# Patient Record
Sex: FEMALE | Race: WHITE | NOT HISPANIC OR LATINO | Employment: FULL TIME | URBAN - METROPOLITAN AREA
[De-identification: names, ages, dates, MRNs, and addresses within clinical notes are randomized per-mention and may not be internally consistent; named-entity substitution may affect disease eponyms.]

---

## 2023-06-05 LAB
ANION GAP IN SER/PLAS: 7 MMOL/L (ref 10–20)
CALCIUM (MG/DL) IN SER/PLAS: 8.7 MG/DL (ref 8.6–10.3)
CARBON DIOXIDE, TOTAL (MMOL/L) IN SER/PLAS: 28 MMOL/L (ref 21–32)
CHLORIDE (MMOL/L) IN SER/PLAS: 105 MMOL/L (ref 98–107)
CREATININE (MG/DL) IN SER/PLAS: 1.01 MG/DL (ref 0.5–1.05)
ERYTHROCYTE DISTRIBUTION WIDTH (RATIO) BY AUTOMATED COUNT: 13.2 % (ref 11.5–14.5)
ERYTHROCYTE MEAN CORPUSCULAR HEMOGLOBIN CONCENTRATION (G/DL) BY AUTOMATED: 33.1 G/DL (ref 32–36)
ERYTHROCYTE MEAN CORPUSCULAR VOLUME (FL) BY AUTOMATED COUNT: 91 FL (ref 80–100)
ERYTHROCYTES (10*6/UL) IN BLOOD BY AUTOMATED COUNT: 4.54 X10E12/L (ref 4–5.2)
ESTIMATED AVERAGE GLUCOSE FOR HBA1C: 105 MG/DL
GFR FEMALE: 65 ML/MIN/1.73M2
GLUCOSE (MG/DL) IN SER/PLAS: 84 MG/DL (ref 74–99)
HEMATOCRIT (%) IN BLOOD BY AUTOMATED COUNT: 41.1 % (ref 36–46)
HEMOGLOBIN (G/DL) IN BLOOD: 13.6 G/DL (ref 12–16)
HEMOGLOBIN A1C/HEMOGLOBIN TOTAL IN BLOOD: 5.3 %
INR IN PPP BY COAGULATION ASSAY: 1 (ref 0.9–1.1)
LEUKOCYTES (10*3/UL) IN BLOOD BY AUTOMATED COUNT: 6.8 X10E9/L (ref 4.4–11.3)
NRBC (PER 100 WBCS) BY AUTOMATED COUNT: 0 /100 WBC (ref 0–0)
PLATELETS (10*3/UL) IN BLOOD AUTOMATED COUNT: 166 X10E9/L (ref 150–450)
POTASSIUM (MMOL/L) IN SER/PLAS: 3.9 MMOL/L (ref 3.5–5.3)
PROTHROMBIN TIME (PT) IN PPP BY COAGULATION ASSAY: 11.4 SEC (ref 9.8–13.4)
RBC, URINE: NORMAL /HPF (ref 0–5)
SODIUM (MMOL/L) IN SER/PLAS: 136 MMOL/L (ref 136–145)
SQUAMOUS EPITHELIAL CELLS, URINE: 1 /HPF
UREA NITROGEN (MG/DL) IN SER/PLAS: 22 MG/DL (ref 6–23)
WBC, URINE: NORMAL /HPF (ref 0–5)

## 2023-06-07 LAB — URINE CULTURE: ABNORMAL

## 2023-06-09 LAB
3-OH-COTININE, URINE: <50 NG/ML
ANABASINE, URINE: <5 NG/ML
COTININE, URINE: <15 NG/ML
NICOTINE, URINE: <15 NG/ML

## 2023-06-27 ENCOUNTER — HOSPITAL ENCOUNTER (OUTPATIENT)
Dept: DATA CONVERSION | Facility: HOSPITAL | Age: 58
End: 2023-06-27
Attending: STUDENT IN AN ORGANIZED HEALTH CARE EDUCATION/TRAINING PROGRAM | Admitting: STUDENT IN AN ORGANIZED HEALTH CARE EDUCATION/TRAINING PROGRAM
Payer: COMMERCIAL

## 2023-06-27 DIAGNOSIS — F64.9 GENDER IDENTITY DISORDER, UNSPECIFIED: ICD-10-CM

## 2023-06-27 DIAGNOSIS — T81.31XA DISRUPTION OF EXTERNAL OPERATION (SURGICAL) WOUND, NOT ELSEWHERE CLASSIFIED, INITIAL ENCOUNTER: ICD-10-CM

## 2023-06-27 DIAGNOSIS — Z53.9 PROCEDURE AND TREATMENT NOT CARRIED OUT, UNSPECIFIED REASON: ICD-10-CM

## 2023-06-27 LAB
ABO GROUP (TYPE) IN BLOOD: NORMAL
ABO GROUP (TYPE) IN BLOOD: NORMAL
ANTIBODY SCREEN: NORMAL
RH FACTOR: NORMAL
RH FACTOR: NORMAL

## 2023-07-01 LAB
ATRIAL RATE: 76 BPM
P AXIS: 52 DEGREES
P OFFSET: 187 MS
P ONSET: 133 MS
PR INTERVAL: 160 MS
Q ONSET: 213 MS
QRS COUNT: 13 BEATS
QRS DURATION: 90 MS
QT INTERVAL: 394 MS
QTC CALCULATION(BAZETT): 443 MS
QTC FREDERICIA: 426 MS
R AXIS: 16 DEGREES
T AXIS: 44 DEGREES
T OFFSET: 410 MS
VENTRICULAR RATE: 76 BPM

## 2023-09-11 LAB
ANION GAP IN SER/PLAS: 9 MMOL/L (ref 10–20)
APPEARANCE, URINE: NORMAL
BILIRUBIN, URINE: NEGATIVE
BLOOD, URINE: NEGATIVE
CALCIUM (MG/DL) IN SER/PLAS: 8.9 MG/DL (ref 8.6–10.3)
CARBON DIOXIDE, TOTAL (MMOL/L) IN SER/PLAS: 25 MMOL/L (ref 21–32)
CHLORIDE (MMOL/L) IN SER/PLAS: 106 MMOL/L (ref 98–107)
COLOR, URINE: YELLOW
CREATININE (MG/DL) IN SER/PLAS: 1.22 MG/DL (ref 0.5–1.05)
ERYTHROCYTE DISTRIBUTION WIDTH (RATIO) BY AUTOMATED COUNT: 13.6 % (ref 11.5–14.5)
ERYTHROCYTE MEAN CORPUSCULAR HEMOGLOBIN CONCENTRATION (G/DL) BY AUTOMATED: 32.7 G/DL (ref 32–36)
ERYTHROCYTE MEAN CORPUSCULAR VOLUME (FL) BY AUTOMATED COUNT: 91 FL (ref 80–100)
ERYTHROCYTES (10*6/UL) IN BLOOD BY AUTOMATED COUNT: 4.75 X10E12/L (ref 4–5.2)
GFR FEMALE: 52 ML/MIN/1.73M2
GLUCOSE (MG/DL) IN SER/PLAS: 97 MG/DL (ref 74–99)
GLUCOSE, URINE: NEGATIVE MG/DL
HEMATOCRIT (%) IN BLOOD BY AUTOMATED COUNT: 43.4 % (ref 36–46)
HEMOGLOBIN (G/DL) IN BLOOD: 14.2 G/DL (ref 12–16)
KETONES, URINE: NEGATIVE MG/DL
LEUKOCYTE ESTERASE, URINE: NEGATIVE
LEUKOCYTES (10*3/UL) IN BLOOD BY AUTOMATED COUNT: 5.9 X10E9/L (ref 4.4–11.3)
NITRITE, URINE: NEGATIVE
PH, URINE: 6 (ref 5–8)
PLATELETS (10*3/UL) IN BLOOD AUTOMATED COUNT: 181 X10E9/L (ref 150–450)
POTASSIUM (MMOL/L) IN SER/PLAS: 3.7 MMOL/L (ref 3.5–5.3)
PROTEIN, URINE: NEGATIVE MG/DL
SODIUM (MMOL/L) IN SER/PLAS: 136 MMOL/L (ref 136–145)
SPECIFIC GRAVITY, URINE: 1.02 (ref 1–1.03)
UREA NITROGEN (MG/DL) IN SER/PLAS: 25 MG/DL (ref 6–23)
UROBILINOGEN, URINE: <2 MG/DL (ref 0–1.9)

## 2023-09-12 LAB
ESTIMATED AVERAGE GLUCOSE FOR HBA1C: 100 MG/DL
HEMOGLOBIN A1C/HEMOGLOBIN TOTAL IN BLOOD: 5.1 %
URINE CULTURE: NO GROWTH

## 2023-09-26 ENCOUNTER — HOSPITAL ENCOUNTER (OUTPATIENT)
Dept: DATA CONVERSION | Facility: HOSPITAL | Age: 58
End: 2023-09-26
Attending: STUDENT IN AN ORGANIZED HEALTH CARE EDUCATION/TRAINING PROGRAM | Admitting: STUDENT IN AN ORGANIZED HEALTH CARE EDUCATION/TRAINING PROGRAM
Payer: COMMERCIAL

## 2023-09-26 DIAGNOSIS — N20.0 CALCULUS OF KIDNEY: ICD-10-CM

## 2023-09-29 VITALS — BODY MASS INDEX: 25.23 KG/M2 | WEIGHT: 186.29 LBS | HEIGHT: 72 IN

## 2023-09-29 VITALS
HEART RATE: 79 BPM | WEIGHT: 186.29 LBS | TEMPERATURE: 97.7 F | BODY MASS INDEX: 25.27 KG/M2 | SYSTOLIC BLOOD PRESSURE: 134 MMHG | RESPIRATION RATE: 18 BRPM | DIASTOLIC BLOOD PRESSURE: 83 MMHG

## 2023-09-30 NOTE — H&P
History of Present Illness:   Pregnant/Lactating:  ·  Are You Pregnant no   ·  Are You Currently Breastfeeding no     History Present Illness:  Reason for surgery: gender dysphoria   HPI:    58 year old transfeminine individual who presents for robotic peritoneal pull through vaginoplasty    Allergies:        Allergies:  ·  No Known Allergies :     Home Medication Review:   Home Medications Reviewed: yes     Impression/Procedure:   ·  Impression and Planned Procedure: robotic peritoneal pull through vaginoplasty       ERAS (Enhanced Recovery After Surgery):  ·  ERAS Patient: no       Vital Signs:  Temperature C: 36.5 degrees C   Temperature F: 97.7 degrees F   Heart Rate: 79 beats per minute   Respiratory Rate: 18 breath per minute   Blood Pressure Systolic: 134 mm/Hg   Blood Pressure Diastolic: 83 mm/Hg     Physical Exam by System:    Respiratory/Thorax: nonlabored on RA   Cardiovascular: RRR     Consent:   COVID-19 Consent:  ·  COVID-19 Risk Consent Surgeon has reviewed key risks related to the risk of yaneth COVID-19 and if they contract COVID-19 what the risks are.     Attestation:   Note Completion:  I am a:  Resident/Fellow   Attending Attestation I saw and evaluated the patient.  I personally obtained the key and critical portions of the history and physical exam or was physically present for key and  critical portions performed by the resident/fellow. I reviewed the resident/fellow?s documentation and discussed the patient with the resident/fellow.  I agree with the resident/fellow?s medical decision making as documented in the note.     I personally evaluated the patient on 27-Jun-2023         Electronic Signatures:  Alexa Blake)  (Signed 10-Jul-2023 15:19)   Authored: Physical Exam, Note Completion   Co-Signer: History of Present Illness, Allergies, Home Medication Review, Impression/Procedure, ERAS, Physical Exam, Consent, Note Completion  Brandee Hines (Resident))  (Signed 27-Jun-2023  06:40)   Authored: History of Present Illness, Allergies, Home  Medication Review, Impression/Procedure, ERAS, Physical Exam, Consent, Note Completion      Last Updated: 10-Jul-2023 15:19 by Alexa Blake)

## 2023-09-30 NOTE — H&P
History & Physical Reviewed:   Pregnant/Lactating:  ·  Are You Pregnant no   ·  Are You Currently Breastfeeding no     I have reviewed the History and Physical dated:  19-Sep-2023   History and Physical reviewed and relevant findings noted. Patient examined to review pertinent physical  findings.: No significant changes   Home Medications Reviewed: no changes noted   Allergies Reviewed: no changes noted       ERAS (Enhanced Recovery After Surgery):  ·  ERAS Patient: no     Consent:   COVID-19 Consent:  ·  COVID-19 Risk Consent Surgeon has reviewed key risks related to the risk of yaneth COVID-19 and if they contract COVID-19 what the risks are.     Attestation:   Note Completion:  I am a:  Resident/Fellow   Attending Attestation I saw and evaluated the patient.  I personally obtained the key and critical portions of the history and physical exam or was physically present for key and  critical portions performed by the resident/fellow. I reviewed the resident/fellow?s documentation and discussed the patient with the resident/fellow.  I agree with the resident/fellow?s medical decision making as documented in the note.     I personally evaluated the patient on 26-Sep-2023         Electronic Signatures:  Umesh Chun (Resident))  (Signed 26-Sep-2023 09:08)   Authored: History & Physical Reviewed, ERAS, Consent,  Note Completion  Alexa Blake)  (Signed 29-Sep-2023 21:23)   Authored: Note Completion   Co-Signer: History & Physical Reviewed, ERAS, Consent, Note Completion      Last Updated: 29-Sep-2023 21:23 by Alexa Blake)

## 2023-10-01 NOTE — OP NOTE
PROCEDURE DETAILS    Preoperative Diagnosis:  Bilateral nephrolithiasis  Postoperative Diagnosis:  Bilateral nephrolithiasis  Surgeon: Alexa Blake  Resident/Fellow/Other Assistant: Umesh Chun    Procedure:  cystoscopy  bilateral retrograde pyelogram  bilateral ureteroscopy  bilateral ureteral stent placement  Anesthesia: Geneeral  Estimated Blood Loss: 0  Findings: Left prox ureteral stricture ~7F, unable to traverse with ureteroscope  Right distal ureter narrowing, unable to advance ureteroscope  B/l stent placement - stones will be treated at later date  Specimens(s) Collected: no,     IV Fluids: see anesthesia  Urine Output: lost to field  Drains and/or Catheters: n/a  Implants: bilateral ureteral stent placement  Patient Returned To/Condition: PACU/stable        Operative Report:   Indications: Patient with non-obstructing but symptomatic bilateral nephrolithiasis with larger stone burden on left side who is scheduled for gender-affirming  vaginoplasty in 1 week.         Patient consented to procedure in preoperative area. Risks and benefits discussed. Patient was marked on the both sides. Allergies were reviewed and preoperative antibiotics were administered. Patient was brought to the operating room and placed in supine  position on the operating room table. A timeout was performed. All were in agreement. Patient underwent general anesthesia without complication. They were repositioned in dorsal lithotomy and prepped and draped in the usual sterile fashion. A 21 fr rigid  cystoscope was used to perform cystourethroscopy. Urethra was normal. UOs were in normal orthotopic position. No masses or stones were identified.  Sensor wire was placed through a 5Fr ureteral catheter to the level of the left kidney as confirmed on  fluoroscopy. The catheter was then removed. A dual lumen was placed over the wire. Retrograde pyelogram was performed.     Left Retrograde pyelogram interpretation: ureter  with normal caliber and course.    A second wire was advanced to the left kidney through the dual lumen catheter. Then the dual lumen was removed. One wire was secured as a safety wire. A ureteral access sheath was attempted to be advanced over the second wire under fluoro, but the ureter  was too tight. The flexible ureteroscope was advanced along the wire without a sheath, meeting some resistance. Advancement under direct vision revealed a 7F ureteral stricture with location on fluoro confirmed as proximal ureter, this was unable to be  traversed with the ureteroscope. We decided to proceed with  JJ stent placement for passive dilation of the ureter and return for stone treatment at a later date. A 6x26 JJ stent was placed over the safety wire with proximal curl noted in kidney on fluoro  and distal curl in the bladder on direct visualization.      We turned our attention to the right kidney. Sensor wire was placed through a 5Fr ureteral catheter to the level of the right kidney as confirmed on fluoroscopy. The catheter was then removed. A dual lumen was placed over the wire. Retrograde pyelogram  was performed.     Right Retrograde pyelogram interpretation: ureter with normal caliber and course.    A second wire was advanced to the kidney through the dual lumen catheter. Then the dual lumen was removed. One wire was secured as a safety wire. We were again unable to advance a ureteral access sheath due to tightness of the ureter. We attempted to  advance the flexible ureteroscope under vision over the wire, but were unable to advance past distal ureter due to the narrowing. We decided to proceed with  JJ stent placement for passive dilation of the ureter. A 6x26 JJ stent was placed over the safety  wire with proximal curl noted in kidney on fluoro and distal curl in the bladder on direct visualization. Bladder was drained, instruments removed. This concluded the procedure. Patient was awoken from anesthesia  without complication and transferred to  PACU in stable condition.        Follow-up: Patient will return for vaginoplasty gender-affirming surgery in 1 week. Her stones will be treated in a separate procedure at a later  date. She will maintain the ureteral stents until her definitive stone management.                        Attestation:   Note Completion:  Attending Attestation I was present for the entire procedure    I am a: Resident/Fellow         Electronic Signatures:  Umesh Chun (Resident))  (Signed 26-Sep-2023 15:02)   Authored: Post-Operative Note, Chart Review, Note Completion  Alexa Blake)  (Signed 29-Sep-2023 21:24)   Authored: Note Completion   Co-Signer: Post-Operative Note, Chart Review, Note Completion      Last Updated: 29-Sep-2023 21:24 by Alexa Blake)

## 2023-10-02 PROBLEM — F64.9 GENDER DYSPHORIA: Status: ACTIVE | Noted: 2023-10-02

## 2023-10-02 RX ORDER — PROGESTERONE 200 MG/1
1 CAPSULE ORAL NIGHTLY
COMMUNITY
Start: 2023-05-01

## 2023-10-02 RX ORDER — RIMEGEPANT SULFATE 75 MG/75MG
75 TABLET, ORALLY DISINTEGRATING ORAL DAILY PRN
COMMUNITY
Start: 2023-02-01

## 2023-10-02 RX ORDER — SODIUM BICARBONATE 650 MG/1
1 TABLET ORAL 2 TIMES DAILY
COMMUNITY
Start: 2021-11-02

## 2023-10-02 RX ORDER — OXYBUTYNIN CHLORIDE 5 MG/1
5 TABLET ORAL 3 TIMES DAILY
COMMUNITY
End: 2023-10-09 | Stop reason: HOSPADM

## 2023-10-02 RX ORDER — ACETAMINOPHEN 500 MG
50 TABLET ORAL DAILY
COMMUNITY
Start: 2021-11-02

## 2023-10-02 RX ORDER — ALLOPURINOL 100 MG/1
100 TABLET ORAL
COMMUNITY
Start: 2019-05-08 | End: 2023-12-01 | Stop reason: SDUPTHER

## 2023-10-02 RX ORDER — AMITRIPTYLINE HYDROCHLORIDE 25 MG/1
3 TABLET, FILM COATED ORAL NIGHTLY
COMMUNITY
Start: 2012-08-22

## 2023-10-02 RX ORDER — TAMSULOSIN HYDROCHLORIDE 0.4 MG/1
0.4 CAPSULE ORAL
COMMUNITY
End: 2023-10-31 | Stop reason: SDUPTHER

## 2023-10-02 RX ORDER — MULTIVIT WITH MINERALS/HERBS
1 TABLET ORAL
COMMUNITY
Start: 2021-11-02

## 2023-10-02 RX ORDER — ATORVASTATIN CALCIUM 10 MG/1
1 TABLET, FILM COATED ORAL
COMMUNITY
Start: 2018-09-28 | End: 2024-03-18 | Stop reason: SINTOL

## 2023-10-02 RX ORDER — AMLODIPINE BESYLATE 5 MG/1
5 TABLET ORAL
COMMUNITY
Start: 2023-09-13 | End: 2024-03-18 | Stop reason: SINTOL

## 2023-10-02 RX ORDER — ESTRADIOL VALERATE 20 MG/ML
20 INJECTION INTRAMUSCULAR
COMMUNITY
Start: 2021-06-09

## 2023-10-03 ENCOUNTER — ANESTHESIA (OUTPATIENT)
Dept: OPERATING ROOM | Facility: HOSPITAL | Age: 58
DRG: 876 | End: 2023-10-03
Payer: COMMERCIAL

## 2023-10-03 ENCOUNTER — HOSPITAL ENCOUNTER (INPATIENT)
Facility: HOSPITAL | Age: 58
LOS: 6 days | Discharge: HOME | DRG: 876 | End: 2023-10-09
Attending: UROLOGY | Admitting: UROLOGY
Payer: COMMERCIAL

## 2023-10-03 ENCOUNTER — ANESTHESIA EVENT (OUTPATIENT)
Dept: OPERATING ROOM | Facility: HOSPITAL | Age: 58
DRG: 876 | End: 2023-10-03
Payer: COMMERCIAL

## 2023-10-03 DIAGNOSIS — F64.9 GENDER IDENTITY DISORDER, UNSPECIFIED: ICD-10-CM

## 2023-10-03 DIAGNOSIS — K59.03 CONSTIPATION DUE TO PAIN MEDICATION: ICD-10-CM

## 2023-10-03 DIAGNOSIS — G89.18 ACUTE POST-OPERATIVE PAIN: ICD-10-CM

## 2023-10-03 DIAGNOSIS — F64.9 GENDER IDENTITY DISORDER, UNSPECIFIED: Primary | ICD-10-CM

## 2023-10-03 PROBLEM — E78.5 HYPERLIPIDEMIA: Status: ACTIVE | Noted: 2023-10-03

## 2023-10-03 PROBLEM — I10 HTN (HYPERTENSION): Status: ACTIVE | Noted: 2023-10-03

## 2023-10-03 PROBLEM — F32.A DEPRESSION: Status: ACTIVE | Noted: 2023-10-03

## 2023-10-03 LAB
ABO GROUP (TYPE) IN BLOOD: NORMAL
ANTIBODY SCREEN: NORMAL
RH FACTOR (ANTIGEN D): NORMAL

## 2023-10-03 PROCEDURE — 2580000001 HC RX 258 IV SOLUTIONS: Performed by: NURSE ANESTHETIST, CERTIFIED REGISTERED

## 2023-10-03 PROCEDURE — 14301 TIS TRNFR ANY 30.1-60 SQ CM: CPT | Performed by: STUDENT IN AN ORGANIZED HEALTH CARE EDUCATION/TRAINING PROGRAM

## 2023-10-03 PROCEDURE — 15004 WOUND PREP F/N/HF/G: CPT | Performed by: UROLOGY

## 2023-10-03 PROCEDURE — 56800 PLASTIC REPAIR INTROITUS: CPT | Performed by: UROLOGY

## 2023-10-03 PROCEDURE — A57292 PR CONSTRUCT ARTIFIC VAGINA W GRAFT: Performed by: ANESTHESIOLOGY

## 2023-10-03 PROCEDURE — 3600000005 HC OR TIME - INITIAL BASE CHARGE - PROCEDURE LEVEL FIVE: Performed by: UROLOGY

## 2023-10-03 PROCEDURE — 86850 RBC ANTIBODY SCREEN: CPT | Performed by: UROLOGY

## 2023-10-03 PROCEDURE — 2500000005 HC RX 250 GENERAL PHARMACY W/O HCPCS: Performed by: NURSE PRACTITIONER

## 2023-10-03 PROCEDURE — 36415 COLL VENOUS BLD VENIPUNCTURE: CPT | Performed by: UROLOGY

## 2023-10-03 PROCEDURE — 1100000001 HC PRIVATE ROOM DAILY

## 2023-10-03 PROCEDURE — 2500000001 HC RX 250 WO HCPCS SELF ADMINISTERED DRUGS (ALT 637 FOR MEDICARE OP): Performed by: UROLOGY

## 2023-10-03 PROCEDURE — 57425 LAPAROSCOPY SURG COLPOPEXY: CPT | Performed by: STUDENT IN AN ORGANIZED HEALTH CARE EDUCATION/TRAINING PROGRAM

## 2023-10-03 PROCEDURE — A57292 PR CONSTRUCT ARTIFIC VAGINA W GRAFT: Performed by: NURSE ANESTHETIST, CERTIFIED REGISTERED

## 2023-10-03 PROCEDURE — 7100000002 HC RECOVERY ROOM TIME - EACH INCREMENTAL 1 MINUTE: Performed by: UROLOGY

## 2023-10-03 PROCEDURE — 54125 REMOVAL OF PENIS: CPT | Performed by: STUDENT IN AN ORGANIZED HEALTH CARE EDUCATION/TRAINING PROGRAM

## 2023-10-03 PROCEDURE — 57425 LAPAROSCOPY SURG COLPOPEXY: CPT | Performed by: UROLOGY

## 2023-10-03 PROCEDURE — 53430 RECONSTRUCTION OF URETHRA: CPT | Performed by: UROLOGY

## 2023-10-03 PROCEDURE — 2500000004 HC RX 250 GENERAL PHARMACY W/ HCPCS (ALT 636 FOR OP/ED): Performed by: UROLOGY

## 2023-10-03 PROCEDURE — 3700000001 HC GENERAL ANESTHESIA TIME - INITIAL BASE CHARGE: Performed by: UROLOGY

## 2023-10-03 PROCEDURE — 15734 MUSCLE-SKIN GRAFT TRUNK: CPT | Performed by: STUDENT IN AN ORGANIZED HEALTH CARE EDUCATION/TRAINING PROGRAM

## 2023-10-03 PROCEDURE — 15275 SKIN SUB GRAFT FACE/NK/HF/G: CPT | Performed by: UROLOGY

## 2023-10-03 PROCEDURE — 15734 MUSCLE-SKIN GRAFT TRUNK: CPT | Performed by: UROLOGY

## 2023-10-03 PROCEDURE — 55150 REMOVAL OF SCROTUM: CPT | Performed by: STUDENT IN AN ORGANIZED HEALTH CARE EDUCATION/TRAINING PROGRAM

## 2023-10-03 PROCEDURE — 57292 CONSTRUCT VAGINA WITH GRAFT: CPT | Performed by: STUDENT IN AN ORGANIZED HEALTH CARE EDUCATION/TRAINING PROGRAM

## 2023-10-03 PROCEDURE — 56805 CLITOROPLASTY INTERSEX STATE: CPT | Performed by: STUDENT IN AN ORGANIZED HEALTH CARE EDUCATION/TRAINING PROGRAM

## 2023-10-03 PROCEDURE — 3600000010 HC OR TIME - EACH INCREMENTAL 1 MINUTE - PROCEDURE LEVEL FIVE: Performed by: UROLOGY

## 2023-10-03 PROCEDURE — 8E0W0CZ ROBOTIC ASSISTED PROCEDURE OF TRUNK REGION, OPEN APPROACH: ICD-10-PCS | Performed by: UROLOGY

## 2023-10-03 PROCEDURE — 56800 PLASTIC REPAIR INTROITUS: CPT | Performed by: STUDENT IN AN ORGANIZED HEALTH CARE EDUCATION/TRAINING PROGRAM

## 2023-10-03 PROCEDURE — 7100000001 HC RECOVERY ROOM TIME - INITIAL BASE CHARGE: Performed by: UROLOGY

## 2023-10-03 PROCEDURE — C1760 CLOSURE DEV, VASC: HCPCS | Performed by: UROLOGY

## 2023-10-03 PROCEDURE — 15004 WOUND PREP F/N/HF/G: CPT | Performed by: STUDENT IN AN ORGANIZED HEALTH CARE EDUCATION/TRAINING PROGRAM

## 2023-10-03 PROCEDURE — 2780000003 HC OR 278 NO HCPCS: Performed by: UROLOGY

## 2023-10-03 PROCEDURE — 0W4M0K0 CREATION OF VAGINA IN MALE PERINEUM WITH NONAUTOLOGOUS TISSUE SUBSTITUTE, OPEN APPROACH: ICD-10-PCS | Performed by: UROLOGY

## 2023-10-03 PROCEDURE — 3700000002 HC GENERAL ANESTHESIA TIME - EACH INCREMENTAL 1 MINUTE: Performed by: UROLOGY

## 2023-10-03 PROCEDURE — 14302 TIS TRNFR ADDL 30 SQ CM: CPT | Performed by: UROLOGY

## 2023-10-03 PROCEDURE — 53430 RECONSTRUCTION OF URETHRA: CPT | Performed by: STUDENT IN AN ORGANIZED HEALTH CARE EDUCATION/TRAINING PROGRAM

## 2023-10-03 PROCEDURE — 2500000005 HC RX 250 GENERAL PHARMACY W/O HCPCS: Performed by: NURSE ANESTHETIST, CERTIFIED REGISTERED

## 2023-10-03 PROCEDURE — 56805 CLITOROPLASTY INTERSEX STATE: CPT | Performed by: UROLOGY

## 2023-10-03 PROCEDURE — 2500000004 HC RX 250 GENERAL PHARMACY W/ HCPCS (ALT 636 FOR OP/ED): Performed by: NURSE ANESTHETIST, CERTIFIED REGISTERED

## 2023-10-03 PROCEDURE — 54125 REMOVAL OF PENIS: CPT | Performed by: UROLOGY

## 2023-10-03 PROCEDURE — 2500000004 HC RX 250 GENERAL PHARMACY W/ HCPCS (ALT 636 FOR OP/ED): Performed by: NURSE PRACTITIONER

## 2023-10-03 PROCEDURE — 57292 CONSTRUCT VAGINA WITH GRAFT: CPT | Performed by: UROLOGY

## 2023-10-03 PROCEDURE — 2500000005 HC RX 250 GENERAL PHARMACY W/O HCPCS: Performed by: UROLOGY

## 2023-10-03 PROCEDURE — 54520 REMOVAL OF TESTIS: CPT | Performed by: UROLOGY

## 2023-10-03 PROCEDURE — 2500000001 HC RX 250 WO HCPCS SELF ADMINISTERED DRUGS (ALT 637 FOR MEDICARE OP): Performed by: NURSE PRACTITIONER

## 2023-10-03 PROCEDURE — 14301 TIS TRNFR ANY 30.1-60 SQ CM: CPT | Performed by: UROLOGY

## 2023-10-03 PROCEDURE — 14302 TIS TRNFR ADDL 30 SQ CM: CPT | Performed by: STUDENT IN AN ORGANIZED HEALTH CARE EDUCATION/TRAINING PROGRAM

## 2023-10-03 PROCEDURE — 55150 REMOVAL OF SCROTUM: CPT | Performed by: UROLOGY

## 2023-10-03 PROCEDURE — 2720000007 HC OR 272 NO HCPCS: Performed by: UROLOGY

## 2023-10-03 DEVICE — FISH-SKIN GRAFT FOR SURGICAL USE. KERECIS® SURGICLOSE® IS INDICATED FOR THE MANAGEMENT OF WOUNDS INCLUDING: PARTIAL AND FULL THICKNESS WOUNDS, PRESSURE ULCERS, CHRONIC VASCULAR ULCERS, DIABETIC ULCERS, TRAUMA WOUNDS (ABRASIONS, LACERATIONS, SECOND-DEGREE BURNS, SKIN TEARS), SURGICAL WOUNDS (DONOR SITE/GRAFTS, POST-MOHS SURGERY, POST-LASER SURGERY, PODIATRIC, WOUND DEHISCENCE) AND DRAINING WOUNDS.IFU: HTTPS://WWW.KERECIS.COM/IFUS/IFU-KERECIS-SURGICLOSE/
Type: IMPLANTABLE DEVICE | Site: VAGINA | Status: FUNCTIONAL
Brand: KERECIS® SURGICLOSE®

## 2023-10-03 RX ORDER — B-COMPLEX WITH VITAMIN C
1 TABLET ORAL DAILY
Status: DISCONTINUED | OUTPATIENT
Start: 2023-10-03 | End: 2023-10-09 | Stop reason: HOSPADM

## 2023-10-03 RX ORDER — MORPHINE SULFATE 2 MG/ML
2 INJECTION, SOLUTION INTRAMUSCULAR; INTRAVENOUS EVERY 5 MIN PRN
Status: DISCONTINUED | OUTPATIENT
Start: 2023-10-03 | End: 2023-10-03 | Stop reason: HOSPADM

## 2023-10-03 RX ORDER — HEPARIN SODIUM 5000 [USP'U]/ML
5000 INJECTION, SOLUTION INTRAVENOUS; SUBCUTANEOUS ONCE
Status: COMPLETED | OUTPATIENT
Start: 2023-10-03 | End: 2023-10-03

## 2023-10-03 RX ORDER — DOCUSATE SODIUM 100 MG/1
100 CAPSULE, LIQUID FILLED ORAL 2 TIMES DAILY
Status: DISCONTINUED | OUTPATIENT
Start: 2023-10-03 | End: 2023-10-05

## 2023-10-03 RX ORDER — ONDANSETRON HYDROCHLORIDE 2 MG/ML
4 INJECTION, SOLUTION INTRAVENOUS ONCE AS NEEDED
Status: DISCONTINUED | OUTPATIENT
Start: 2023-10-03 | End: 2023-10-03 | Stop reason: HOSPADM

## 2023-10-03 RX ORDER — LABETALOL HYDROCHLORIDE 5 MG/ML
5 INJECTION, SOLUTION INTRAVENOUS ONCE AS NEEDED
Status: DISCONTINUED | OUTPATIENT
Start: 2023-10-03 | End: 2023-10-03 | Stop reason: HOSPADM

## 2023-10-03 RX ORDER — ACETAMINOPHEN 325 MG/1
975 TABLET ORAL ONCE
Status: DISCONTINUED | OUTPATIENT
Start: 2023-10-03 | End: 2023-10-03 | Stop reason: HOSPADM

## 2023-10-03 RX ORDER — GABAPENTIN 300 MG/1
600 CAPSULE ORAL ONCE
Status: COMPLETED | OUTPATIENT
Start: 2023-10-03 | End: 2023-10-03

## 2023-10-03 RX ORDER — CHOLECALCIFEROL (VITAMIN D3) 25 MCG
50 TABLET ORAL DAILY
Status: DISCONTINUED | OUTPATIENT
Start: 2023-10-03 | End: 2023-10-09 | Stop reason: HOSPADM

## 2023-10-03 RX ORDER — CELECOXIB 100 MG/1
400 CAPSULE ORAL ONCE
Status: COMPLETED | OUTPATIENT
Start: 2023-10-03 | End: 2023-10-03

## 2023-10-03 RX ORDER — PROMETHAZINE HYDROCHLORIDE 50 MG/ML
12.5 INJECTION, SOLUTION INTRAMUSCULAR; INTRAVENOUS ONCE AS NEEDED
Status: DISCONTINUED | OUTPATIENT
Start: 2023-10-03 | End: 2023-10-03 | Stop reason: HOSPADM

## 2023-10-03 RX ORDER — OXYBUTYNIN CHLORIDE 5 MG/1
5 TABLET ORAL 3 TIMES DAILY
Status: DISCONTINUED | OUTPATIENT
Start: 2023-10-03 | End: 2023-10-09 | Stop reason: HOSPADM

## 2023-10-03 RX ORDER — CEFAZOLIN SODIUM 2 G/100ML
2 INJECTION, SOLUTION INTRAVENOUS EVERY 8 HOURS
Status: DISCONTINUED | OUTPATIENT
Start: 2023-10-03 | End: 2023-10-09 | Stop reason: HOSPADM

## 2023-10-03 RX ORDER — LIDOCAINE HYDROCHLORIDE 20 MG/ML
INJECTION, SOLUTION INFILTRATION; PERINEURAL AS NEEDED
Status: DISCONTINUED | OUTPATIENT
Start: 2023-10-03 | End: 2023-10-03

## 2023-10-03 RX ORDER — HYDROMORPHONE HYDROCHLORIDE 2 MG/ML
INJECTION, SOLUTION INTRAMUSCULAR; INTRAVENOUS; SUBCUTANEOUS AS NEEDED
Status: DISCONTINUED | OUTPATIENT
Start: 2023-10-03 | End: 2023-10-03

## 2023-10-03 RX ORDER — DIPHENHYDRAMINE HYDROCHLORIDE 50 MG/ML
25 INJECTION INTRAMUSCULAR; INTRAVENOUS ONCE AS NEEDED
Status: DISCONTINUED | OUTPATIENT
Start: 2023-10-03 | End: 2023-10-03 | Stop reason: HOSPADM

## 2023-10-03 RX ORDER — LIDOCAINE HYDROCHLORIDE 10 MG/ML
0.1 INJECTION INFILTRATION; PERINEURAL ONCE
Status: DISCONTINUED | OUTPATIENT
Start: 2023-10-03 | End: 2023-10-03 | Stop reason: HOSPADM

## 2023-10-03 RX ORDER — HYDROMORPHONE HYDROCHLORIDE 1 MG/ML
1 INJECTION, SOLUTION INTRAMUSCULAR; INTRAVENOUS; SUBCUTANEOUS EVERY 5 MIN PRN
Status: DISCONTINUED | OUTPATIENT
Start: 2023-10-03 | End: 2023-10-03 | Stop reason: HOSPADM

## 2023-10-03 RX ORDER — ONDANSETRON 4 MG/1
4 TABLET, ORALLY DISINTEGRATING ORAL EVERY 8 HOURS PRN
Status: DISCONTINUED | OUTPATIENT
Start: 2023-10-03 | End: 2023-10-09 | Stop reason: HOSPADM

## 2023-10-03 RX ORDER — METOPROLOL TARTRATE 1 MG/ML
5 INJECTION, SOLUTION INTRAVENOUS ONCE
Status: DISCONTINUED | OUTPATIENT
Start: 2023-10-03 | End: 2023-10-03 | Stop reason: HOSPADM

## 2023-10-03 RX ORDER — PHENYLEPHRINE HCL IN 0.9% NACL 1 MG/10 ML
SYRINGE (ML) INTRAVENOUS AS NEEDED
Status: DISCONTINUED | OUTPATIENT
Start: 2023-10-03 | End: 2023-10-03

## 2023-10-03 RX ORDER — MIDAZOLAM HYDROCHLORIDE 1 MG/ML
INJECTION, SOLUTION INTRAMUSCULAR; INTRAVENOUS AS NEEDED
Status: DISCONTINUED | OUTPATIENT
Start: 2023-10-03 | End: 2023-10-03

## 2023-10-03 RX ORDER — SCOLOPAMINE TRANSDERMAL SYSTEM 1 MG/1
PATCH, EXTENDED RELEASE TRANSDERMAL AS NEEDED
Status: DISCONTINUED | OUTPATIENT
Start: 2023-10-03 | End: 2023-10-03

## 2023-10-03 RX ORDER — FAMOTIDINE 10 MG/ML
20 INJECTION INTRAVENOUS ONCE
Status: DISCONTINUED | OUTPATIENT
Start: 2023-10-03 | End: 2023-10-03 | Stop reason: HOSPADM

## 2023-10-03 RX ORDER — LIDOCAINE HYDROCHLORIDE AND EPINEPHRINE 20; 10 MG/ML; UG/ML
INJECTION, SOLUTION INFILTRATION; PERINEURAL AS NEEDED
Status: DISCONTINUED | OUTPATIENT
Start: 2023-10-03 | End: 2023-10-03 | Stop reason: HOSPADM

## 2023-10-03 RX ORDER — CEFAZOLIN SODIUM 2 G/100ML
2 INJECTION, SOLUTION INTRAVENOUS ONCE
Status: COMPLETED | OUTPATIENT
Start: 2023-10-03 | End: 2023-10-03

## 2023-10-03 RX ORDER — SCOLOPAMINE TRANSDERMAL SYSTEM 1 MG/1
PATCH, EXTENDED RELEASE TRANSDERMAL
Status: COMPLETED
Start: 2023-10-03 | End: 2023-10-03

## 2023-10-03 RX ORDER — KETOROLAC TROMETHAMINE 30 MG/ML
15 INJECTION, SOLUTION INTRAMUSCULAR; INTRAVENOUS EVERY 6 HOURS
Status: DISPENSED | OUTPATIENT
Start: 2023-10-03 | End: 2023-10-06

## 2023-10-03 RX ORDER — POLYETHYLENE GLYCOL 3350 17 G/17G
17 POWDER, FOR SOLUTION ORAL DAILY
Status: DISCONTINUED | OUTPATIENT
Start: 2023-10-03 | End: 2023-10-09 | Stop reason: HOSPADM

## 2023-10-03 RX ORDER — GENTAMICIN SULFATE 60 MG/50ML
INJECTION, SOLUTION INTRAVENOUS
Status: DISPENSED
Start: 2023-10-03 | End: 2023-10-03

## 2023-10-03 RX ORDER — GENTAMICIN 40 MG/ML
INJECTION, SOLUTION INTRAMUSCULAR; INTRAVENOUS AS NEEDED
Status: DISCONTINUED | OUTPATIENT
Start: 2023-10-03 | End: 2023-10-03

## 2023-10-03 RX ORDER — CEFAZOLIN SODIUM 2 G/100ML
2 INJECTION, SOLUTION INTRAVENOUS ONCE
Status: DISCONTINUED | OUTPATIENT
Start: 2023-10-03 | End: 2023-10-03

## 2023-10-03 RX ORDER — ACETAMINOPHEN 325 MG/1
975 TABLET ORAL ONCE
Status: COMPLETED | OUTPATIENT
Start: 2023-10-03 | End: 2023-10-03

## 2023-10-03 RX ORDER — FENTANYL CITRATE 50 UG/ML
INJECTION, SOLUTION INTRAMUSCULAR; INTRAVENOUS AS NEEDED
Status: DISCONTINUED | OUTPATIENT
Start: 2023-10-03 | End: 2023-10-03

## 2023-10-03 RX ORDER — PROGESTERONE 200 MG/1
200 CAPSULE ORAL DAILY
Status: DISCONTINUED | OUTPATIENT
Start: 2023-10-03 | End: 2023-10-09 | Stop reason: HOSPADM

## 2023-10-03 RX ORDER — ALBUTEROL SULFATE 0.83 MG/ML
2.5 SOLUTION RESPIRATORY (INHALATION) ONCE AS NEEDED
Status: DISCONTINUED | OUTPATIENT
Start: 2023-10-03 | End: 2023-10-03 | Stop reason: HOSPADM

## 2023-10-03 RX ORDER — ONDANSETRON HYDROCHLORIDE 2 MG/ML
INJECTION, SOLUTION INTRAVENOUS AS NEEDED
Status: DISCONTINUED | OUTPATIENT
Start: 2023-10-03 | End: 2023-10-03

## 2023-10-03 RX ORDER — ALLOPURINOL 100 MG/1
100 TABLET ORAL DAILY
Status: DISCONTINUED | OUTPATIENT
Start: 2023-10-03 | End: 2023-10-09 | Stop reason: HOSPADM

## 2023-10-03 RX ORDER — PROPOFOL 10 MG/ML
INJECTION, EMULSION INTRAVENOUS AS NEEDED
Status: DISCONTINUED | OUTPATIENT
Start: 2023-10-03 | End: 2023-10-03

## 2023-10-03 RX ORDER — ATORVASTATIN CALCIUM 10 MG/1
10 TABLET, FILM COATED ORAL DAILY
Status: DISCONTINUED | OUTPATIENT
Start: 2023-10-03 | End: 2023-10-09 | Stop reason: HOSPADM

## 2023-10-03 RX ORDER — SODIUM CHLORIDE, SODIUM LACTATE, POTASSIUM CHLORIDE, CALCIUM CHLORIDE 600; 310; 30; 20 MG/100ML; MG/100ML; MG/100ML; MG/100ML
100 INJECTION, SOLUTION INTRAVENOUS CONTINUOUS
Status: DISCONTINUED | OUTPATIENT
Start: 2023-10-03 | End: 2023-10-03 | Stop reason: HOSPADM

## 2023-10-03 RX ORDER — DEXAMETHASONE SODIUM PHOSPHATE 4 MG/ML
INJECTION, SOLUTION INTRA-ARTICULAR; INTRALESIONAL; INTRAMUSCULAR; INTRAVENOUS; SOFT TISSUE AS NEEDED
Status: DISCONTINUED | OUTPATIENT
Start: 2023-10-03 | End: 2023-10-03

## 2023-10-03 RX ORDER — BUPIVACAINE HYDROCHLORIDE 2.5 MG/ML
INJECTION, SOLUTION INFILTRATION; PERINEURAL AS NEEDED
Status: DISCONTINUED | OUTPATIENT
Start: 2023-10-03 | End: 2023-10-03 | Stop reason: HOSPADM

## 2023-10-03 RX ORDER — GENTAMICIN SULFATE 60 MG/50ML
120 INJECTION, SOLUTION INTRAVENOUS ONCE
Status: CANCELLED | OUTPATIENT
Start: 2023-10-03 | End: 2023-10-03

## 2023-10-03 RX ORDER — SODIUM CHLORIDE 9 MG/ML
100 INJECTION, SOLUTION INTRAVENOUS CONTINUOUS
Status: DISCONTINUED | OUTPATIENT
Start: 2023-10-03 | End: 2023-10-04

## 2023-10-03 RX ORDER — ONDANSETRON HYDROCHLORIDE 2 MG/ML
4 INJECTION, SOLUTION INTRAVENOUS EVERY 6 HOURS PRN
Status: DISCONTINUED | OUTPATIENT
Start: 2023-10-03 | End: 2023-10-09 | Stop reason: HOSPADM

## 2023-10-03 RX ORDER — SODIUM BICARBONATE 650 MG/1
650 TABLET ORAL
Status: DISCONTINUED | OUTPATIENT
Start: 2023-10-03 | End: 2023-10-09 | Stop reason: HOSPADM

## 2023-10-03 RX ORDER — TAMSULOSIN HYDROCHLORIDE 0.4 MG/1
0.4 CAPSULE ORAL DAILY
Status: DISCONTINUED | OUTPATIENT
Start: 2023-10-03 | End: 2023-10-09 | Stop reason: HOSPADM

## 2023-10-03 RX ORDER — AMLODIPINE BESYLATE 5 MG/1
5 TABLET ORAL DAILY
Status: DISCONTINUED | OUTPATIENT
Start: 2023-10-03 | End: 2023-10-09 | Stop reason: HOSPADM

## 2023-10-03 RX ORDER — ROCURONIUM BROMIDE 10 MG/ML
INJECTION, SOLUTION INTRAVENOUS AS NEEDED
Status: DISCONTINUED | OUTPATIENT
Start: 2023-10-03 | End: 2023-10-03

## 2023-10-03 RX ORDER — NORETHINDRONE AND ETHINYL ESTRADIOL 0.5-0.035
KIT ORAL AS NEEDED
Status: DISCONTINUED | OUTPATIENT
Start: 2023-10-03 | End: 2023-10-03

## 2023-10-03 RX ORDER — LIDOCAINE 560 MG/1
1 PATCH PERCUTANEOUS; TOPICAL; TRANSDERMAL DAILY
Status: DISCONTINUED | OUTPATIENT
Start: 2023-10-03 | End: 2023-10-09 | Stop reason: HOSPADM

## 2023-10-03 RX ORDER — ENOXAPARIN SODIUM 100 MG/ML
40 INJECTION SUBCUTANEOUS EVERY 24 HOURS
Status: DISCONTINUED | OUTPATIENT
Start: 2023-10-03 | End: 2023-10-09 | Stop reason: HOSPADM

## 2023-10-03 RX ORDER — HYDRALAZINE HYDROCHLORIDE 20 MG/ML
5 INJECTION INTRAMUSCULAR; INTRAVENOUS EVERY 30 MIN PRN
Status: DISCONTINUED | OUTPATIENT
Start: 2023-10-03 | End: 2023-10-03 | Stop reason: HOSPADM

## 2023-10-03 RX ORDER — ACETAMINOPHEN 325 MG/1
975 TABLET ORAL EVERY 6 HOURS
Status: DISCONTINUED | OUTPATIENT
Start: 2023-10-03 | End: 2023-10-09 | Stop reason: HOSPADM

## 2023-10-03 RX ORDER — METRONIDAZOLE 500 MG/100ML
500 INJECTION, SOLUTION INTRAVENOUS ONCE
Status: COMPLETED | OUTPATIENT
Start: 2023-10-03 | End: 2023-10-03

## 2023-10-03 RX ORDER — MEPERIDINE HYDROCHLORIDE 25 MG/ML
12.5 INJECTION INTRAMUSCULAR; INTRAVENOUS; SUBCUTANEOUS EVERY 10 MIN PRN
Status: DISCONTINUED | OUTPATIENT
Start: 2023-10-03 | End: 2023-10-03 | Stop reason: HOSPADM

## 2023-10-03 RX ORDER — MIDAZOLAM HYDROCHLORIDE 1 MG/ML
1 INJECTION, SOLUTION INTRAMUSCULAR; INTRAVENOUS ONCE AS NEEDED
Status: DISCONTINUED | OUTPATIENT
Start: 2023-10-03 | End: 2023-10-03 | Stop reason: HOSPADM

## 2023-10-03 RX ORDER — SCOLOPAMINE TRANSDERMAL SYSTEM 1 MG/1
1 PATCH, EXTENDED RELEASE TRANSDERMAL ONCE
Status: DISCONTINUED | OUTPATIENT
Start: 2023-10-03 | End: 2023-10-03 | Stop reason: HOSPADM

## 2023-10-03 RX ADMIN — SODIUM CHLORIDE 100 ML/HR: 9 INJECTION, SOLUTION INTRAVENOUS at 20:26

## 2023-10-03 RX ADMIN — Medication 100 MCG: at 10:56

## 2023-10-03 RX ADMIN — ACETAMINOPHEN 975 MG: 325 TABLET, FILM COATED ORAL at 06:52

## 2023-10-03 RX ADMIN — DEXAMETHASONE SODIUM PHOSPHATE 4 MG: 4 INJECTION, SOLUTION INTRAMUSCULAR; INTRAVENOUS at 08:20

## 2023-10-03 RX ADMIN — Medication 200 MCG: at 08:57

## 2023-10-03 RX ADMIN — AMLODIPINE BESYLATE 5 MG: 5 TABLET ORAL at 22:29

## 2023-10-03 RX ADMIN — SODIUM CHLORIDE, SODIUM LACTATE, POTASSIUM CHLORIDE, AND CALCIUM CHLORIDE: .6; .31; .03; .02 INJECTION, SOLUTION INTRAVENOUS at 07:49

## 2023-10-03 RX ADMIN — MIDAZOLAM 2 MG: 1 INJECTION INTRAMUSCULAR; INTRAVENOUS at 07:35

## 2023-10-03 RX ADMIN — ATORVASTATIN CALCIUM 10 MG: 10 TABLET, FILM COATED ORAL at 20:20

## 2023-10-03 RX ADMIN — ONDANSETRON 4 MG: 2 INJECTION INTRAMUSCULAR; INTRAVENOUS at 09:32

## 2023-10-03 RX ADMIN — EPHEDRINE SULFATE 10 MG: 50 INJECTION, SOLUTION INTRAVENOUS at 08:53

## 2023-10-03 RX ADMIN — AMITRIPTYLINE HYDROCHLORIDE 75 MG: 25 TABLET, FILM COATED ORAL at 22:28

## 2023-10-03 RX ADMIN — Medication 200 MCG: at 11:25

## 2023-10-03 RX ADMIN — SODIUM CHLORIDE 40 MCG: 9 INJECTION, SOLUTION INTRAVENOUS at 07:40

## 2023-10-03 RX ADMIN — CEFAZOLIN SODIUM 2 G: 2 INJECTION, SOLUTION INTRAVENOUS at 07:30

## 2023-10-03 RX ADMIN — Medication 100 MCG: at 11:09

## 2023-10-03 RX ADMIN — SUGAMMADEX 200 MG: 100 INJECTION, SOLUTION INTRAVENOUS at 11:08

## 2023-10-03 RX ADMIN — EPHEDRINE SULFATE 20 MG: 50 INJECTION, SOLUTION INTRAVENOUS at 10:42

## 2023-10-03 RX ADMIN — ROCURONIUM BROMIDE 50 MG: 10 INJECTION, SOLUTION INTRAVENOUS at 07:42

## 2023-10-03 RX ADMIN — ROCURONIUM BROMIDE 20 MG: 10 INJECTION, SOLUTION INTRAVENOUS at 09:53

## 2023-10-03 RX ADMIN — LIDOCAINE 1 PATCH: 4 PATCH TOPICAL at 20:17

## 2023-10-03 RX ADMIN — LIDOCAINE HYDROCHLORIDE 80 MG: 20 INJECTION, SOLUTION INFILTRATION; PERINEURAL at 07:40

## 2023-10-03 RX ADMIN — ONDANSETRON 4 MG: 2 INJECTION INTRAMUSCULAR; INTRAVENOUS at 08:20

## 2023-10-03 RX ADMIN — Medication 200 MCG: at 08:44

## 2023-10-03 RX ADMIN — FENTANYL CITRATE 50 MCG: 50 INJECTION, SOLUTION INTRAMUSCULAR; INTRAVENOUS at 07:42

## 2023-10-03 RX ADMIN — DOCUSATE SODIUM 100 MG: 100 CAPSULE, LIQUID FILLED ORAL at 20:19

## 2023-10-03 RX ADMIN — ROCURONIUM BROMIDE 30 MG: 10 INJECTION, SOLUTION INTRAVENOUS at 09:00

## 2023-10-03 RX ADMIN — HYDROMORPHONE HYDROCHLORIDE 1 MG: 2 INJECTION, SOLUTION INTRAMUSCULAR; INTRAVENOUS; SUBCUTANEOUS at 09:59

## 2023-10-03 RX ADMIN — SODIUM CHLORIDE 8 MCG: 9 INJECTION, SOLUTION INTRAVENOUS at 10:44

## 2023-10-03 RX ADMIN — HEPARIN SODIUM 5000 UNITS: 5000 INJECTION INTRAVENOUS; SUBCUTANEOUS at 06:45

## 2023-10-03 RX ADMIN — Medication 200 MCG: at 08:45

## 2023-10-03 RX ADMIN — SCOLOPAMINE TRANSDERMAL SYSTEM 1 PATCH: 1 PATCH, EXTENDED RELEASE TRANSDERMAL at 07:20

## 2023-10-03 RX ADMIN — SODIUM CHLORIDE 100 ML/HR: 9 INJECTION, SOLUTION INTRAVENOUS at 18:08

## 2023-10-03 RX ADMIN — DEXAMETHASONE SODIUM PHOSPHATE 4 MG: 4 INJECTION, SOLUTION INTRAMUSCULAR; INTRAVENOUS at 07:50

## 2023-10-03 RX ADMIN — HYDROMORPHONE HYDROCHLORIDE 1 MG: 2 INJECTION, SOLUTION INTRAMUSCULAR; INTRAVENOUS; SUBCUTANEOUS at 10:42

## 2023-10-03 RX ADMIN — POLYETHYLENE GLYCOL 3350 17 G: 17 POWDER, FOR SOLUTION ORAL at 21:00

## 2023-10-03 RX ADMIN — KETOROLAC TROMETHAMINE 15 MG: 30 INJECTION, SOLUTION INTRAMUSCULAR at 22:27

## 2023-10-03 RX ADMIN — GENTAMICIN SULFATE 120 MG: 40 INJECTION, SOLUTION INTRAMUSCULAR; INTRAVENOUS at 08:00

## 2023-10-03 RX ADMIN — PROPOFOL 200 MG: 10 INJECTION, EMULSION INTRAVENOUS at 07:40

## 2023-10-03 RX ADMIN — GABAPENTIN 600 MG: 300 CAPSULE ORAL at 06:44

## 2023-10-03 RX ADMIN — SODIUM BICARBONATE 650 MG TABLET 650 MG: at 18:05

## 2023-10-03 RX ADMIN — ALLOPURINOL 100 MG: 100 TABLET ORAL at 20:21

## 2023-10-03 RX ADMIN — METRONIDAZOLE 500 MG: 500 INJECTION, SOLUTION INTRAVENOUS at 08:00

## 2023-10-03 RX ADMIN — OXYBUTYNIN CHLORIDE 5 MG: 5 TABLET ORAL at 20:22

## 2023-10-03 RX ADMIN — Medication 100 MCG: at 08:42

## 2023-10-03 RX ADMIN — ACETAMINOPHEN 975 MG: 325 TABLET, FILM COATED ORAL at 22:26

## 2023-10-03 RX ADMIN — FENTANYL CITRATE 50 MCG: 50 INJECTION, SOLUTION INTRAMUSCULAR; INTRAVENOUS at 07:40

## 2023-10-03 RX ADMIN — Medication 100 MCG: at 08:41

## 2023-10-03 RX ADMIN — CHOLECALCIFEROL TAB 25 MCG (1000 UNIT) 50 MCG: 25 TAB at 20:20

## 2023-10-03 RX ADMIN — Medication 200 MCG: at 08:55

## 2023-10-03 RX ADMIN — CELECOXIB 400 MG: 100 CAPSULE ORAL at 06:44

## 2023-10-03 RX ADMIN — CEFAZOLIN SODIUM 2 G: 2 INJECTION, SOLUTION INTRAVENOUS at 20:17

## 2023-10-03 RX ADMIN — EPHEDRINE SULFATE 20 MG: 50 INJECTION, SOLUTION INTRAVENOUS at 08:57

## 2023-10-03 RX ADMIN — Medication 200 MCG: at 11:00

## 2023-10-03 RX ADMIN — TAMSULOSIN HYDROCHLORIDE 0.4 MG: 0.4 CAPSULE ORAL at 18:05

## 2023-10-03 SDOH — SOCIAL STABILITY: SOCIAL INSECURITY: HAS ANYONE EVER THREATENED TO HURT YOUR FAMILY OR YOUR PETS?: NO

## 2023-10-03 SDOH — SOCIAL STABILITY: SOCIAL INSECURITY: WERE YOU ABLE TO COMPLETE ALL THE BEHAVIORAL HEALTH SCREENINGS?: YES

## 2023-10-03 SDOH — SOCIAL STABILITY: SOCIAL INSECURITY: ABUSE: ADULT

## 2023-10-03 SDOH — SOCIAL STABILITY: SOCIAL INSECURITY: ARE THERE ANY APPARENT SIGNS OF INJURIES/BEHAVIORS THAT COULD BE RELATED TO ABUSE/NEGLECT?: NO

## 2023-10-03 SDOH — HEALTH STABILITY: MENTAL HEALTH: CURRENT SMOKER: 0

## 2023-10-03 SDOH — SOCIAL STABILITY: SOCIAL INSECURITY: DO YOU FEEL ANYONE HAS EXPLOITED OR TAKEN ADVANTAGE OF YOU FINANCIALLY OR OF YOUR PERSONAL PROPERTY?: NO

## 2023-10-03 SDOH — SOCIAL STABILITY: SOCIAL INSECURITY: ARE YOU OR HAVE YOU BEEN THREATENED OR ABUSED PHYSICALLY, EMOTIONALLY, OR SEXUALLY BY ANYONE?: NO

## 2023-10-03 SDOH — SOCIAL STABILITY: SOCIAL INSECURITY: DO YOU FEEL UNSAFE GOING BACK TO THE PLACE WHERE YOU ARE LIVING?: NO

## 2023-10-03 SDOH — SOCIAL STABILITY: SOCIAL INSECURITY: DOES ANYONE TRY TO KEEP YOU FROM HAVING/CONTACTING OTHER FRIENDS OR DOING THINGS OUTSIDE YOUR HOME?: NO

## 2023-10-03 ASSESSMENT — PAIN - FUNCTIONAL ASSESSMENT
PAIN_FUNCTIONAL_ASSESSMENT: 0-10

## 2023-10-03 ASSESSMENT — ACTIVITIES OF DAILY LIVING (ADL)
ADEQUATE_TO_COMPLETE_ADL: YES
HEARING - RIGHT EAR: FUNCTIONAL
TOILETING: NEEDS ASSISTANCE
GROOMING: INDEPENDENT
JUDGMENT_ADEQUATE_SAFELY_COMPLETE_DAILY_ACTIVITIES: YES
PATIENT'S MEMORY ADEQUATE TO SAFELY COMPLETE DAILY ACTIVITIES?: YES
WALKS IN HOME: NEEDS ASSISTANCE
BATHING: NEEDS ASSISTANCE
DRESSING YOURSELF: NEEDS ASSISTANCE
FEEDING YOURSELF: INDEPENDENT
HEARING - LEFT EAR: FUNCTIONAL

## 2023-10-03 ASSESSMENT — PAIN SCALES - GENERAL
PAINLEVEL_OUTOF10: 0 - NO PAIN
PAIN_LEVEL: 0
PAINLEVEL_OUTOF10: 3
PAINLEVEL_OUTOF10: 0 - NO PAIN
PAINLEVEL_OUTOF10: 3
PAINLEVEL_OUTOF10: 0 - NO PAIN
PAINLEVEL_OUTOF10: 0 - NO PAIN

## 2023-10-03 ASSESSMENT — COGNITIVE AND FUNCTIONAL STATUS - GENERAL
MOVING FROM LYING ON BACK TO SITTING ON SIDE OF FLAT BED WITH BEDRAILS: A LITTLE
TURNING FROM BACK TO SIDE WHILE IN FLAT BAD: A LITTLE
WALKING IN HOSPITAL ROOM: A LITTLE
DAILY ACTIVITIY SCORE: 21
MOBILITY SCORE: 18
MOVING TO AND FROM BED TO CHAIR: A LITTLE
CLIMB 3 TO 5 STEPS WITH RAILING: A LITTLE
STANDING UP FROM CHAIR USING ARMS: A LITTLE
TOILETING: A LITTLE
HELP NEEDED FOR BATHING: A LITTLE
PATIENT BASELINE BEDBOUND: NO
DRESSING REGULAR LOWER BODY CLOTHING: A LITTLE

## 2023-10-03 ASSESSMENT — LIFESTYLE VARIABLES
HOW MANY STANDARD DRINKS CONTAINING ALCOHOL DO YOU HAVE ON A TYPICAL DAY: PATIENT DOES NOT DRINK
PRESCIPTION_ABUSE_PAST_12_MONTHS: NO
HOW OFTEN DO YOU HAVE 6 OR MORE DRINKS ON ONE OCCASION: NEVER
SUBSTANCE_ABUSE_PAST_12_MONTHS: NO
AUDIT-C TOTAL SCORE: 0
AUDIT-C TOTAL SCORE: 0
SKIP TO QUESTIONS 9-10: 1
HOW OFTEN DO YOU HAVE A DRINK CONTAINING ALCOHOL: NEVER

## 2023-10-03 ASSESSMENT — PATIENT HEALTH QUESTIONNAIRE - PHQ9
SUM OF ALL RESPONSES TO PHQ9 QUESTIONS 1 & 2: 0
1. LITTLE INTEREST OR PLEASURE IN DOING THINGS: NOT AT ALL
2. FEELING DOWN, DEPRESSED OR HOPELESS: NOT AT ALL

## 2023-10-03 ASSESSMENT — COLUMBIA-SUICIDE SEVERITY RATING SCALE - C-SSRS
1. IN THE PAST MONTH, HAVE YOU WISHED YOU WERE DEAD OR WISHED YOU COULD GO TO SLEEP AND NOT WAKE UP?: NO
6. HAVE YOU EVER DONE ANYTHING, STARTED TO DO ANYTHING, OR PREPARED TO DO ANYTHING TO END YOUR LIFE?: NO
2. HAVE YOU ACTUALLY HAD ANY THOUGHTS OF KILLING YOURSELF?: NO
2. HAVE YOU ACTUALLY HAD ANY THOUGHTS OF KILLING YOURSELF?: NO
6. HAVE YOU EVER DONE ANYTHING, STARTED TO DO ANYTHING, OR PREPARED TO DO ANYTHING TO END YOUR LIFE?: NO
1. IN THE PAST MONTH, HAVE YOU WISHED YOU WERE DEAD OR WISHED YOU COULD GO TO SLEEP AND NOT WAKE UP?: NO

## 2023-10-03 NOTE — OP NOTE
ROBOTIC PERITONEAL PULL THROUGH VAGINOPLASTY (L) Operative Note     Date: 10/3/2023  OR Location: [facility]    Name: Ashley Valencia, : 1965, Age: 58 y.o., MRN: [id number], Sex: female    Diagnosis  Pre-op Diagnosis     * Gender identity disorder, unspecified [F64.9] Post-op Diagnosis     * Gender identity disorder, unspecified [F64.9]     Procedures    * ROBOTIC PERITONEAL PULL THROUGH VAGINOPLASTY   * ROBOTIC PERITONEAL PULL THROUGH VAGINOPLASTY  Bilateral orchiectomy  Penectomy  Scrotectomy  Urethrectomy  Urethroplasty  Clitoroplasty  Ventral fasciocutaneous advancement flap from the urethra for coverage of corporal stumps  LabiaplastyX2  Vaginoplasty with Graft  Use of acellular skin substitute (Kerecis) for lining of vaginal canal  Surgical preparation of recipient area for engraftment 100 cm²  Adjacent tissue transfer advancement of lower abdominal wall flap to allow for orientation of the labia and vaginal canal in the correct anatomic position.  Total area of advancement was 15 cm x 15 cm 225 cm²  Peritoneal flap advancement for lining of the vaginal canal.  Laparoscopic colpopexy  Bilateral TAP block    Surgeons   Alexa Lock      Resident/Fellow/Other Assistant:  MD BETY Barry MD       Procedure Summary  Anesthesia: General  ASA: II  Anesthesia Staff: Anesthesiologist: Davy Michelle MD  CRNA: ANTHONY Ladd-CRNA  Estimated Blood Loss: 20mL  Intra-op Medications:   Medication Name Total Dose   BUPivacaine HCl (Marcaine) 0.25 % (2.5 mg/mL) injection 40 mL   lidocaine-epinephrine (Xylocaine W/EPI) 2 %-1:100,000 injection 20 mL   metroNIDAZOLE in NaCl (iso-os) (Flagyl)  mg 500 mg   scopolamine (Transderm-Scop) 1 mg over 3 days patch  - Omnicell Override Pull Cannot be calculated              Anesthesia Record               Intraprocedure I/O Totals          Intake    Dexmedetomidine 0.00 mL    The total shown is the total volume  documented since Anesthesia Start was filed.    LR 1600.00 mL    Phenylephrine Drip 0.00 mL    The total shown is the total volume documented since Anesthesia Start was filed.    Total Intake 1600 mL       Output    Urine 300 mL    Est. Blood Loss 50 mL    Total Output 350 mL       Net    Net Volume 1250 mL          Specimen: No specimens collected     Staff:   Circulator: Roxie Anthony, MESHA; Sara Bethea, MESHA; Kota Mcadams RN  Relief Circulator: Devika Braga RN  Scrub Person: Daly Jaramillo         Drains and/or Catheters:   Urethral Catheter (Active)   Site Assessment Clean;Skin intact 10/03/23 1544   Collection Container Standard drainage bag 10/03/23 1544   Reason for Continuing Urinary Catheterization surgical procedures: urological/gynecological, pelvic oncology, anal, prolonged surgical procedure 10/03/23 1544       Tourniquet Times:         Implants:  Implants       Type Name Action Serial No.      Implant [id number], [id number], [id number] - [id number] Implanted               Findings:   Stretched penile length 15.5cm; glans 3cm; no circ line; scrotum to anus 12 cm; base to anus 16cm; scrotum 5cm; adductor across 15cm; adductor to anus 15cm; circumference 6.5cm    Indications: Ashley Valencia is an 58 y.o. female who is having surgery for Gender identity disorder, unspecified [F64.9].     The patient was seen in the preoperative area. The risks, benefits, complications, treatment options, non-operative alternatives, expected recovery and outcomes were discussed with the patient. The possibilities of reaction to medication, pulmonary aspiration, injury to surrounding structures, bleeding, recurrent infection, the need for additional procedures, failure to diagnose a condition, and creating a complication requiring transfusion or operation were discussed with the patient. The patient concurred with the proposed plan, giving informed consent.  The site of surgery was properly noted/marked if necessary per policy.  The patient has been actively warmed in preoperative area. Preoperative antibiotics have been ordered and given within 1 hours of incision. Venous thrombosis prophylaxis have been ordered including bilateral sequential compression devices and chemical prophylaxis.    Procedure Details:     Positioning  After informed consent was obtained, the patient was taken to the operating room.  A time-out was performed where the patient and procedure were identified in the presence of Nursing, Anesthesia, and surgical staff.  After the placement of lines and monitors, general anesthesia was induced.  Prophylactic antibiotics were administered.  1 gram of TXA was administered at the beginning and the end of the case along with weight based SQH. Sequential compressive devices were placed on both lower extremities.  The patient was placed in the dorsal lithotomy. Patient was prepped and draped in a standard sterile fashion paying careful attention to pad the pressure areas.      Scrotectomy  The scrotal marking was made. 1% lidocaine with epinephrine was injected subcutaneously and the scrotal skin was resected to be used later as a graft. However the scrotal skin was hairbearing and was not a very good quality.  Therefore, we decided to use an acellular skin substitute that is Kerecis.  A 7 x 10 cm graft that would explain 220 cm² and was prefenestrated was brought to the field and was prepared by putting it in saline.  This was then tubularized around the largest dilator.  This Kerecis graft tube was then sewn to the penile skin to edge on 1 side.  The other side was passed from the perineum into the abdomen.      Orchiectomy  Both testes were identified and the tunica vaginalis was incised to deliver the testes. The testicle and the cord were isolated and traced to the inguinal ring. Zohra clamps were to clamp the cord. Zero silk sutures were used to suture ligate the cords and good hemostasis was confirmed. Bilateral cord  blocks were performed using 1% lidocaine. The testicles were sent for pathology.    Penectomy and clitoroplasty  A circumcising incision was made and penis was degloved to the base of the penis. Care was taken to avoid any injury to the urethra or the dorsal neurovascular bundle. The penis was delivered through the penile skin. The urethra was dissected off the corpora and set aside for the urethroplasty. The dorsal neurovascular bundle was carefully elevated off the dorsal side of the phallus. The glans was marked for the segment to be used for clitoroplasty. The extraneous tissue was discarded. The corporal erectile tissue was dissected and clamped with bull dog clamps. At this point, the tissue was resected, and the corporal edges were closed with 3-0 vicryl. All extraneous tunical tissue was discarded. A 3-0 vicryl was used to bring the medial edges of the clitoral tissue. At this point, the clitoris and the dorsal bundle was fixed to the periosteal and surrounding fascial tissue.     Urethroplasty  The urethra was dissected as proximally as possible. The bulbospongiosus muscle was carefully dissected off the urethra. The dissection was carried proximally with care to the apex of the prostate. The distal urethra was transected and discarded. Another two centimeters of the urethra was spatulated and this fasciomuscular flap for used to create the inside lining of the vestibule by securing it laterally and by quilting it. The meatus was confirmed to have a straight trajectory from the bladder, and a berry catheter was left in place to intermittently drain the bladder.     Robotic vaginoplasty/colpopexy  The VivaReali Xi robot was utilized for this part. An 8mm longitudinal incision was made above the umbilicus and using a veress needle, pneumoperitoneum was established. The port was placed and the viscera was inspected to confirm there was no injury. Subsequently, One additional port was placed on the patient's  left and two ports on the patient's right side. An assistant port was placed between the camera and the left arm more cephalad. The robot was docked. We stared by taking down sigmoid colon adhesions. Subsequently, the vas deferens were identified bilaterally and the peritoneum was incised. Both vas were tracked distally and the seminal vesicles were seen, which were kept anteriorly with the prostate. The dissection was carried out carefully towards the floor of the pelvis until it could be connected with the previously dissected canal from the perineum. Good hemostasis was confirmed.  At this point, a [id number] anterior peritoneal flap was harvested. In addition, two individual lateral posterior flaps ([id number]) were harvested paying careful attention to the ureters. At this point, the penile skin was passed into the abdomen and a 3-0 stratafix suture was used to secure the 6 o'clock part of the inside lining of the vaginal canal. In addition, three more 3-0 stratfix sutures were utilized to create a running closure of the four quadrants of the vagina. Subsequently, a 3-0 double arm suture was used to close the apical part of the peritoneal augment in a running fashion. The robot was undocked and Tiseel was sprayed in the surgical bed under laparoscopic guidance. The ports were removed, and the incisions were closed with a 4-0 monocryl along with dermabond.    Perineoplasty/labiaplasty  A posterior perineal flap was advanced and secured at the 6 o'clock position of the vaginal introitus. In addition, the penile shaft skin was advanced and secured at the 12 o'clock position of the introitus. We then discarded any scrotal skin with significant rogation and raised lateral flaps for labial creation. Subsequently, the preputial skin was secured laterally and inferiorly. The lateral edges of the urethra were sutured to the medial edge of the labial flaps and good hemostasis was confirmed. Horizontal mattress was used to  create a more well-defined labia minora. An isolated horizontal mattress was used to define the clitoral farmer.     Negative pressure wound application  The grey sponge was cut to size. A mepelex barrier was placed directly on the skin. Adhesive was applied on the skin and the sponge was covered with xeroform. The sponge was then methodically covered with the tape to maintain a good pressure dressing. Once a leak proof system was established, foam dressing was applied over the vac.    The patient tolerated the procedure well, and taken to PACU in a stable condition.         Alexa Blake was present throughout the entire procedure and performed all critical steps.        Complications:  None; patient tolerated the procedure well.    Disposition: PACU - hemodynamically stable.  Condition: stable         Additional Details: : Please see Dr. Lock's note for additional details of operative course pertaining to his portion of the procedure.     Attending Attestation: I was present and scrubbed for the entire procedure.    Alexa Blake MD

## 2023-10-03 NOTE — PERIOPERATIVE NURSING NOTE
1340- Dr. Wilhelm notified [facility] since admission.  IV fluids infusing as ordered. Pt talking on phone, denies c/o.

## 2023-10-03 NOTE — ANESTHESIA PREPROCEDURE EVALUATION
Patient: Ashley Valencia    Procedure Information       Date/Time: 10/03/23 0730    Procedure: ROBOTIC PERITONEAL PULL THROUGH VAGINOPLASTY    Location: [facility] 09 / [facility]    Surgeons: Benjamin Lock MD            Relevant Problems   Cardiovascular   (+) HTN (hypertension)   (+) Hyperlipidemia      Neuro/Psych   (+) Depression       Clinical information reviewed:    Allergies  Meds   Med Hx  Surg Hx   Fam Hx  Soc Hx        NPO Detail:  NPO/Void Status  Date of Last Liquid: 10/02/23  Time of Last Liquid: 2200  Date of Last Solid: 10/01/23  Time of Last Solid: 2200         Physical Exam    Airway  Mallampati: I  TM distance: >3 FB  Neck ROM: full     Cardiovascular - normal exam  Rhythm: regular  Rate: normal     Dental - normal exam     Pulmonary - normal exam     Abdominal - normal exam             Anesthesia Plan    ASA 2     general     The patient is not a current smoker.    intravenous induction   Postoperative administration of opioids is intended.  Anesthetic plan and risks discussed with patient.    Plan discussed with attending and CRNA.

## 2023-10-03 NOTE — OP NOTE
ROBOTIC PERITONEAL PULL THROUGH VAGINOPLASTY (L) Operative Note     Date: 10/3/2023  OR Location: [facility]    Name: Ashley Valencia, : 1965, Age: 58 y.o., MRN: [id number], Sex: female    Diagnosis  Pre-op Diagnosis     * Gender identity disorder, unspecified [F64.9] Post-op Diagnosis     * Gender identity disorder, unspecified [F64.9]     Procedures    * ROBOTIC PERITONEAL PULL THROUGH VAGINOPLASTY  Bilateral orchiectomy  Penectomy  Scrotectomy  Urethrectomy  Urethroplasty  Clitoroplasty  Ventral fasciocutaneous advancement flap from the urethra for coverage of corporal stumps  LabiaplastyX2  Vaginoplasty with Graft  Use of acellular skin substitute (Kerecis) for lining of vaginal canal  Surgical preparation of recipient area for engraftment 100 cm²  Adjacent tissue transfer advancement of lower abdominal wall flap to allow for orientation of the labia and vaginal canal in the correct anatomic position.  Total area of advancement was 15 cm x 15 cm 225 cm²  Peritoneal flap advancement for lining of the vaginal canal.  Laparoscopic colpopexy    Surgeons      * Benjamin Lock - Primary     * Alexa Blake    Please note that Dr. Blake and I would be co-surgeons.    Resident/Fellow/Other Assistant:  MD ADALGISA BarryA  Umesh Chun MD    Procedure Summary  Anesthesia: General  ASA: II  Anesthesia Staff: Anesthesiologist: Davy Michelle MD  CRNA: ANTHONY Ladd-CRNA  Estimated Blood Loss: 20mL  Intra-op Medications:   Medication Name Total Dose   BUPivacaine HCl (Marcaine) 0.25 % (2.5 mg/mL) injection 40 mL   lidocaine-epinephrine (Xylocaine W/EPI) 2 %-1:100,000 injection 20 mL   metroNIDAZOLE in NaCl (iso-os) (Flagyl)  mg 500 mg   scopolamine (Transderm-Scop) 1 mg over 3 days patch  - Omnicell Override Pull Cannot be calculated            Anesthesia Record               Intraprocedure I/O Totals          Intake    Dexmedetomidine 0.00 mL    The total shown is the total volume  documented since Anesthesia Start was filed.    LR 1000.00 mL    Phenylephrine Drip 0.00 mL    The total shown is the total volume documented since Anesthesia Start was filed.    Total Intake 1000 mL       Output    Urine 300 mL    Total Output 300 mL       Net    Net Volume 700 mL          Specimen: No specimens collected     Staff:   Circulator: Roxie Anthony, MESHA; Sara Bethea, MESHA; Kota Mcadams RN  Relief Circulator: Devika Braga RN  Scrub Person: Daly Risamargy       Drains and/or Catheters: 16f berry catheter    Tourniquet Times:         Implants:  Implants       Type Name Action Serial No.      Implant [id number], [id number], 126CM2 - [id number] Implanted               Findings: 16cm penile inversion peritoneal capped vaginal canal created    Indications: Ashley Valencia is an 58 y.o. female who is having surgery for Gender identity disorder, unspecified [F64.9].     The patient was seen in the preoperative area. The risks, benefits, complications, treatment options, non-operative alternatives, expected recovery and outcomes were discussed with the patient. The possibilities of reaction to medication, pulmonary aspiration, injury to surrounding structures, bleeding, recurrent infection, the need for additional procedures, failure to diagnose a condition, and creating a complication requiring transfusion or operation were discussed with the patient. The patient concurred with the proposed plan, giving informed consent.  The site of surgery was properly noted/marked if necessary per policy. The patient has been actively warmed in preoperative area. Preoperative antibiotics have been ordered and given within 1 hours of incision. Venous thrombosis prophylaxis have been ordered including bilateral sequential compression devices and chemical prophylaxis       OPERATIVE DETAILS:    The patient was brought into the operating suite and laid supine.  An institution-approved time-out was taken.  She was then  anesthetized and placed in dorsal lithotomy position and prepped and draped in a standard sterile fashion.  We first marked out the area of the scrotum.  This was a trapezoid shaped area that in [employer] code scrotum in its entirety up to the base of the phallus.  This was infiltrated with lidocaine and epinephrine, and a sharp incision was made.  The scrotal skin along with this dartos fascia was then removed with a combination of electrocautery as well as sharply.  Wound then focused on bilateral orchiectomy.  The right testicle was immobilized.  The tunica vaginalis was entered and was excised.  The spermatic cord was dissected and skeletonized up to the level of the external inguinal ring.  Here it was doubly ligated and divided.  Hemostasis was good.  A similar procedure was done on the left side.  The left spermatic cord was skeletonized, and was dissected up to the level of the external inguinal ring.  After double ligation, it was divided.  Both the spermatic cord stumps were infiltrated with lidocaine and epinephrine, and allowed to recede into the inguinal canal.  At this point in time, Dr. Blake established access to the abdomen via a Veress needle.  He then put in robotic and assistant ports to allow for intra-abdominal dissection and surgery.  He then performed a tap block bilaterally.  Aaliyah Blake established pneumoperitoneum and access to the peritoneum, and the robot was docked and brought in.  At the same time, I made a midline perineal incision  2 cm anterior to the anal verge.  This incision was deepened, and subcutaneous tissue was dissected. Bulbar urethra was identified in this location.  The bulbous spongiosis muscle was preserved.  Initially, the central tendon was taken down.  The  rectourethralis muscle was encountered and was divided.  We then continued our dissection of the apex of the prostate.  At the same time, Dr. Blake and his team developed a plane antegrade after incising the  peritoneal reflection in the pelvis and developing the space between the rectum and the prostate.  We were able to meet each other at the apex of the prostate, and a  hiatus was created that allowed the connection from the perineum to the pelvis.  This area was widened enough to allow for a flap to come through and make sure there was no vaginal stenosis.  At this point in time, this area was packed again to reestablish pneumoperitoneum while Dr. Blake continued to work on harvesting anterior and posterior peritoneal flaps off of the bladder and the rectosigmoid area respectively.  The ventral lateral attachments of the phallus were taken down from the perineal incision itself.  The phallus was then inverted into the perineal incision.  Some of the other attachments of the dartos with underlying Peralta fascia were taken down.  After this was completed, the phallus was re-everted, and a circumferential skin incision in the subcoronal location corresponding to the mucocutaneous junction was made.  In the ventral midline, this circum coronal flap was incised in the midline.  We then re-inverted the phallus into the perineal wound.  We then  the bulbar urethra from the underlying corporal body sharply.  This was  distally up to the penoscrotal junction.  Here the urethra was doubly ligated, and transected.  The dorsal attachment of the urethra to the midline of the corporal bodies were divided with electrocautery and sharply up to the level of the membranous urethra.  We entered bilateral corporal bodies at the diversions of the corporal into the crura.  We encountered the end erectile tissue intracorporeally.  Corporotomies were extended on the ventral lateral aspect all the way to the glans penis bilaterally.  The midline septum, and the remnant of the urethra was removed.  Rectal tissue was removed sharply.  Residual extra tissue was addressed with curette.  The edges were debulked sharply leaving  the neurovascular bundle in tact. Distally, the entire neurovascular bundle with the glans was lifted off the underlying corpora.  Once the majority of the glans had been lifted off, we then fashioned a W-shaped incision on the dorsal aspect of the glans to convert it into the clitoris.  This incision was deepened, and this part of the glans was disassembled from the rest of the remaining glans.  We now had the neovascular pedicle with segment of the glans and the circum coronal skin, which would serve as our labia minora and clitoral farmer later.  The clitoris itself was reapproximated in the midline using interrupted 4-0 Monocryl sutures to form a nice conical clitoris.  Once this was done, we focused our attention on the proximal corporal stumps.  The cavernosal artery pedicles inside were fulgurated, and spongy tissue was excised.   These were closed with a combination of interrupted and running 2-0 PDS suture.  Hemostasis was good.   The proximal crural stumps were oversewn with a PDS suture.  Once this was accomplished, we then brought down the previously formed clitoris and secured it distal to the crural stump to the level of the adductor longus tendon.  This was secured with interrupted 3-0 Monocryl sutures.  The entire neovascular pedicle was folded over itself and formed a nice erogenous zone over the pubic bone, and this was also secured to the underlying periosteal tissue using interrupted sutures.  Once this was done, we re-focused our attention on the urethroplasty and the urethra for obscuration.  We removed the bulbospongiosus muscle from the urethra with the LigaSure device.  The urethra itself was ventrally spatulated in the midline, and the corpus spongiosum was debulked.  A fasciomuscular urethral flap advancement was done, and this was secured to the base of the clitoris using interrupted sutures. This urethral advancement flap provided support to the clitoris and coverage of the corporal  stumps. Bilateral urethral edges thus formed were then oversewn with PDS sutures to assist in hemostasis.  The proximal urethrotomy was then matured to the tunica using interrupted sutures to have a nice urethrostomy.  This completed the urethroplasty portion of the case.  Once all of this dissection was done, we focused our attention on the previously created vaginal canal.  Dr. Blake had advanced anterior and posterior peritoneal flaps and secured them anteriorly to the prostate and posteriorly onto the rectal fascia.  Once this had been done, we advanced a dilated to assess the caliber which was noted to be appropriate.  We then advanced the penile skin tube into this canal.  We noticed that the penile tube skin was not long enough to reach the peritoneal flap.  We considered using a scrotal free graft on the penile skin tube  The rest of the uncovered area.  However the scrotal skin was hairbearing and was not a very good quality.  Therefore, we decided to use an acellular skin substitute that is Kerecis.  A 7 x 10 cm graft that would explain 220 cm² and was prefenestrated was brought to the field and was prepared by putting it in saline.  This was then tubularized around the largest dilator.  This Kerecis graft tube was then sewn to the penile skin to edge on 1 side.  The other side was passed from the perineum into the abdomen.  Here Dr. Blake grass this Kerecis tube, and secured it to the peritoneal flap anteriorly posteriorly.  Dr. Blake then performed closure of the edges of the peritoneal flap so there was no communication between the intra-abdominal intraperitoneal contents in the neovagina.  A colpopexy of the apex of the neovagina was performed to allow good suspension.  Once this was done, we measured the vaginal length it was 16 cm in depth.  This was a hybrid vaginal lining of penile skin, Kerecis allograft, as well as peritoneal flaps.  We then outlined our incisions for labia majora plasty.   Excess skin was removed sharply and care was taken to make everything symmetrical.  We then made a dorsal penile skin incision overlying the area of the urethra.  This incision was carried down deep until we encountered the urethra in the midline.  This was extended proximally up to the level of the urethral meatus, and distally up to the level of the clitoris.  The urethral meatus which was matured to the skin edge using interrupted 3-0 Vicryl, the clitoral farmer was then matured to the skin using interrupted Monocryl's.  The sides of the urethral stump were then approximated on each side to the skin edge using a running 3-0 Monocryl.  Interrupted mattress sutures were placed throughout to allow for no more definition of the labia minora, and more definition of the clitoral farmer.  This concluded our clitoral farmer reconstruction, labia minora plasty, and urethral maturation.  We then focused our attention of the labia majora plasty.  Excess skin had been previously removed.  A 3 layer closure was performed with an internal fascial fat closure, a deep dermal layer, and a subcuticular layer closure.  All of these were done with absorbable sutures.  Hemostasis was excellent.  A new Sun catheter was placed per urethra into the bladder.  The vaginal canal entered 16 cm at the end.  But this time, Dr. Blake had exited the abdomen, and desufflated the patient.  Inspection of the intraperitoneal contents was done, and the robot was undocked.  The port sites were closed in multiple layers using deep dermal sutures as well as subcuticular sutures.  Dermabond was applied.  In the perineal incision, we decided to use a wound VAC.  A wound VAC sponge was cut to size and molded to form a stent.  This was wrapped with Xeroform and Mepitel and placed intravaginally.  Externally, Xeroform and Mepitel were used to protect the urethra and the skin edges respectively.  Wound VAC sponge was cut to size and placed over the wound.  This  was secured using a combination of standard wound VAC tape as well as Dermatex.  This was connected to continuous suction 125 with a good seal.  This concluded the procedure.  The patient was repositioned awakened and transferred to the PACU in stable condition.        Complications:  None; patient tolerated the procedure well.    Disposition: PACU - hemodynamically stable.  Condition: stable         Additional Details: Please see Dr. Blake's note for additional details of operative course pertaining to this portion of the procedure.    Attending Attestation:     Benjamin Lock  Phone Number: [phone]

## 2023-10-03 NOTE — ANESTHESIA PROCEDURE NOTES
Airway  Date/Time: 10/3/2023 7:46 AM  Urgency: elective      Staffing  Performed: CRNA   Authorized by: Davy Michelle MD    Performed by: ANTHONY Ladd-LUIS ANTONIO  Patient location during procedure: OR    Indications and Patient Condition  Indications for airway management: anesthesia  Spontaneous Ventilation: absent  Sedation level: deep  Preoxygenated: yes  Patient position: sniffing  Mask difficulty assessment: 2 - vent by mask + OA or adjuvant +/- NMBA  No planned trial extubation    Final Airway Details  Final airway type: endotracheal airway      Successful airway: ETT  Cuffed: yes   Successful intubation technique: video laryngoscopy  Facilitating devices/methods: intubating stylet  Endotracheal tube insertion site: oral  Blade: Erick  Blade size: #4  ETT size (mm): 7.0  Cormack-Lehane Classification: grade I - full view of glottis  Placement verified by: chest auscultation and capnometry   Measured from: lips  ETT to lips (cm): 22  Number of attempts at approach: 1  Ventilation between attempts: none  Number of other approaches attempted: 0    Additional Comments  Elective Fleming mac 4 utilized per SRNA without difficulty

## 2023-10-03 NOTE — CARE PLAN
Problem: Fall/Injury  Goal: Not fall by end of shift  Outcome: Progressing  Goal: Be free from injury by end of the shift  Outcome: Progressing  Goal: Verbalize understanding of personal risk factors for fall in the hospital  Outcome: Progressing  Goal: Verbalize understanding of risk factor reduction measures to prevent injury from fall in the home  Outcome: Progressing  Goal: Use assistive devices by end of the shift  Outcome: Progressing  Goal: Pace activities to prevent fatigue by end of the shift  Outcome: Progressing     Problem: Pain  Goal: Takes deep breaths with improved pain control throughout the shift  Outcome: Progressing  Goal: Turns in bed with improved pain control throughout the shift  Outcome: Progressing  Goal: Walks with improved pain control throughout the shift  Outcome: Progressing  Goal: Performs ADL's with improved pain control throughout shift  Outcome: Progressing  Goal: Participates in PT with improved pain control throughout the shift  Outcome: Progressing  Goal: Free from opioid side effects throughout the shift  Outcome: Progressing  Goal: Free from acute confusion related to pain meds throughout the shift  Outcome: Progressing     Problem: Pain  Goal: My pain/discomfort is manageable  Outcome: Progressing     Problem: Safety  Goal: Patient will be injury free during hospitalization  Outcome: Progressing  Goal: I will remain free of falls  Outcome: Progressing     Problem: Daily Care  Goal: Daily care needs are met  Outcome: Progressing     Problem: Psychosocial Needs  Goal: Demonstrates ability to cope with hospitalization/illness  Outcome: Progressing  Goal: Collaborate with me, my family, and caregiver to identify my specific goals  Outcome: Progressing  Flowsheets (Taken 10/3/2023 1600)  Cultural Requests During Hospitalization: none  Spiritual Requests During Hospitalization: none     Problem: Discharge Barriers  Goal: My discharge needs are met  Outcome: Progressing      Problem: Infection related to problem list condition  Goal: Infection will resolve through treatment  Outcome: Progressing   The patient's goals for the shift include      The clinical goals for the shift include pain

## 2023-10-03 NOTE — ANESTHESIA POSTPROCEDURE EVALUATION
Patient: Ashley Valencia    Procedure Summary       Date: 10/03/23 Room / Location: [facility] OR 09 / [facility]    Anesthesia Start: 0737 Anesthesia Stop:     Procedure: ROBOTIC PERITONEAL PULL THROUGH VAGINOPLASTY (Left) Diagnosis:       Gender identity disorder, unspecified      (Gender identity disorder, unspecified [F64.9])    Surgeons: Benjamin Lock MD Responsible Provider: Davy Michelle MD    Anesthesia Type: general ASA Status: 2            Anesthesia Type: general    Vitals Value Taken Time   /66 10/03/23 1132   Temp 36.8 10/03/23 1132   Pulse 123 10/03/23 1132   Resp 16 10/03/23 1132   SpO2 97 10/03/23 1132       Anesthesia Post Evaluation    Patient location during evaluation: PACU  Patient participation: waiting for patient participation  Level of consciousness: sleepy but conscious  Pain score: 0  Pain management: adequate  Airway patency: positional obstruction  Cardiovascular status: acceptable  Respiratory status: acceptable  Hydration status: acceptable  Comments: Brief use of OA in PACU        No notable events documented.

## 2023-10-03 NOTE — ANESTHESIA PROCEDURE NOTES
Peripheral IV  Date/Time: 10/3/2023 7:50 AM  Inserted by: ANTHONY Ladd-LUIS ANTONIO    Placement  Needle size: 18 G  Laterality: left  Location: forearm  Local anesthetic: none  Site prep: alcohol  Attempts: 1

## 2023-10-03 NOTE — H&P
History Of Present Illness  Ashley Valencia is a 58 y.o. female presenting with gender dysphoria. She is here for robotic peritoneal pull through vaginoplasty.     Past Medical History  She has a past medical history of Abnormal immunological findings in specimens from other organs, systems and tissues (06/30/2014), Allergic rhinitis due to pollen (12/11/2018), Calculus of kidney (01/28/2019), Decreased white blood cell count, unspecified (05/07/2021), Fever, unspecified (10/25/2019), Other hemorrhoids (10/17/2018), Other specified personal risk factors, not elsewhere classified (08/18/2014), Pain in unspecified joint (12/11/2018), Pain in unspecified joint (10/01/2019), Personal history of colonic polyps (05/07/2021), Personal history of other diseases of the digestive system (10/08/2020), Personal history of other diseases of the digestive system (10/08/2020), Personal history of other diseases of the digestive system (10/29/2018), Personal history of other diseases of the digestive system (12/11/2018), Personal history of other diseases of the digestive system (08/18/2014), Personal history of other diseases of the musculoskeletal system and connective tissue (10/29/2018), Personal history of other diseases of the musculoskeletal system and connective tissue (10/29/2014), Personal history of other diseases of the musculoskeletal system and connective tissue (10/01/2019), Personal history of other diseases of the nervous system and sense organs (12/11/2018), Personal history of other endocrine, nutritional and metabolic disease (09/26/2017), Personal history of other endocrine, nutritional and metabolic disease (08/18/2014), Personal history of other endocrine, nutritional and metabolic disease (12/11/2018), Personal history of other specified conditions (10/25/2019), Personal history of other specified conditions (08/18/2014), Personal history of urinary (tract) infections (10/26/2020), Personal history of  urinary calculi (10/01/2019), Personal history of urinary calculi (05/07/2021), Residual hemorrhoidal skin tags (10/01/2019), and Unspecified hydronephrosis (10/08/2020).    Surgical History  She has a past surgical history that includes Hemorrhoid surgery (12/11/2018) and Other surgical history (12/11/2018).     Social History  She has no history on file for tobacco use, alcohol use, and drug use.    Family History  No family history on file.     Allergies  Patient has no known allergies.    Review of Systems     Physical Exam  Cardio: RRR  Pulm: Breathing comfortablely on RR       Last Recorded Vitals  Pulse 93, temperature 36.4 °C (97.5 °F), temperature source Oral, resp. rate 18, height 1.829 m (6'), weight 84.5 kg (186 lb 4.6 oz), SpO2 99 %.    Relevant Results             Assessment/Plan   Active Problems:  There are no active Hospital Problems.      OR for robotic peritoneal pull through vaginoplasty           Umesh Chun MD

## 2023-10-03 NOTE — BRIEF OP NOTE
Date: 10/3/2023  OR Location: [facility] OR    Name: Ashley Valencia, : 1965, Age: 58 y.o., MRN: [id number], Sex: female    Diagnosis  Pre-op Diagnosis     * Gender identity disorder, unspecified [F64.9] Post-op Diagnosis     * Gender identity disorder, unspecified [F64.9]     Procedures    * ROBOTIC PERITONEAL PULL THROUGH VAGINOPLASTY    Surgeons      * Benjamin Lock - Primary     * Alexa Blake    Resident/Fellow/Other Assistant:  No surgical staff documented.    Procedure Summary  Anesthesia: General  ASA: II  Anesthesia Staff: Anesthesiologist: Davy Michelle MD  CRNA: ANTHONY Ladd-CRNA  Estimated Blood Loss: 300 mL  Intra-op Medications:   Medication Name Total Dose   BUPivacaine HCl (Marcaine) 0.25 % (2.5 mg/mL) injection 40 mL   lidocaine-epinephrine (Xylocaine W/EPI) 2 %-1:100,000 injection 20 mL   metroNIDAZOLE in NaCl (iso-os) (Flagyl)  mg 500 mg   scopolamine (Transderm-Scop) 1 mg over 3 days patch  - Omnicell Override Pull Cannot be calculated              Anesthesia Record               Intraprocedure I/O Totals          Intake    Dexmedetomidine 0.00 mL    The total shown is the total volume documented since Anesthesia Start was filed.    LR 1600.00 mL    Phenylephrine Drip 0.00 mL    The total shown is the total volume documented since Anesthesia Start was filed.    Total Intake 1600 mL       Output    Urine 300 mL    Est. Blood Loss 50 mL    Total Output 350 mL       Net    Net Volume 1250 mL          Specimen: No specimens collected     Staff:   Circulator: Roxie Anthony RN; Sara Bethea RN; Kota Mcadams RN  Relief Circulator: Devika Braga RN  Scrub Person: Daly Jaramillo          Findings: Normal anatomy. scrotal skin not usable for canal creation due to hair follicles. Vaginal canal formation done with peritoneum, kerecis, and penile skin    Complications:  None; patient tolerated the procedure well.     Disposition: PACU - hemodynamically stable.  Condition:  stable  Specimens Collected: No specimens collected  Attending Attestation: I was present and scrubbed for the entire procedure.    Benjamin Lock  Phone Number: [phone]

## 2023-10-04 LAB
ANION GAP SERPL CALC-SCNC: <7 MMOL/L (ref 10–20)
BUN SERPL-MCNC: 13 MG/DL (ref 6–23)
CALCIUM SERPL-MCNC: 7.6 MG/DL (ref 8.6–10.3)
CHLORIDE SERPL-SCNC: 109 MMOL/L (ref 98–107)
CO2 SERPL-SCNC: 26 MMOL/L (ref 21–32)
CREAT SERPL-MCNC: 1.04 MG/DL (ref 0.5–1.05)
ERYTHROCYTE [DISTWIDTH] IN BLOOD BY AUTOMATED COUNT: 13.2 % (ref 11.5–14.5)
GFR SERPL CREATININE-BSD FRML MDRD: 63 ML/MIN/1.73M*2
GLUCOSE SERPL-MCNC: 97 MG/DL (ref 74–99)
HCT VFR BLD AUTO: 30.7 % (ref 36–46)
HGB BLD-MCNC: 10 G/DL (ref 12–16)
MCH RBC QN AUTO: 29.9 PG (ref 26–34)
MCHC RBC AUTO-ENTMCNC: 32.6 G/DL (ref 32–36)
MCV RBC AUTO: 92 FL (ref 80–100)
NRBC BLD-RTO: 0 /100 WBCS (ref 0–0)
PLATELET # BLD AUTO: 152 X10*3/UL (ref 150–450)
PMV BLD AUTO: 10.2 FL (ref 7.5–11.5)
POTASSIUM SERPL-SCNC: 4.4 MMOL/L (ref 3.5–5.3)
RBC # BLD AUTO: 3.34 X10*6/UL (ref 4–5.2)
SODIUM SERPL-SCNC: 137 MMOL/L (ref 136–145)
WBC # BLD AUTO: 8 X10*3/UL (ref 4.4–11.3)

## 2023-10-04 PROCEDURE — 36415 COLL VENOUS BLD VENIPUNCTURE: CPT | Performed by: NURSE PRACTITIONER

## 2023-10-04 PROCEDURE — 1100000001 HC PRIVATE ROOM DAILY

## 2023-10-04 PROCEDURE — 99232 SBSQ HOSP IP/OBS MODERATE 35: CPT | Performed by: NURSE PRACTITIONER

## 2023-10-04 PROCEDURE — 80048 BASIC METABOLIC PNL TOTAL CA: CPT | Performed by: NURSE PRACTITIONER

## 2023-10-04 PROCEDURE — 2500000001 HC RX 250 WO HCPCS SELF ADMINISTERED DRUGS (ALT 637 FOR MEDICARE OP): Performed by: NURSE PRACTITIONER

## 2023-10-04 PROCEDURE — 99253 IP/OBS CNSLTJ NEW/EST LOW 45: CPT | Performed by: NURSE PRACTITIONER

## 2023-10-04 PROCEDURE — 85027 COMPLETE CBC AUTOMATED: CPT | Performed by: NURSE PRACTITIONER

## 2023-10-04 PROCEDURE — 2500000004 HC RX 250 GENERAL PHARMACY W/ HCPCS (ALT 636 FOR OP/ED): Performed by: NURSE PRACTITIONER

## 2023-10-04 PROCEDURE — 2500000002 HC RX 250 W HCPCS SELF ADMINISTERED DRUGS (ALT 637 FOR MEDICARE OP, ALT 636 FOR OP/ED): Performed by: NURSE PRACTITIONER

## 2023-10-04 RX ORDER — ESTRADIOL VALERATE 20 MG/ML
20 INJECTION INTRAMUSCULAR
Status: DISCONTINUED | OUTPATIENT
Start: 2023-10-05 | End: 2023-10-06

## 2023-10-04 RX ORDER — OXYCODONE HYDROCHLORIDE 5 MG/1
5 TABLET ORAL EVERY 4 HOURS PRN
Status: DISCONTINUED | OUTPATIENT
Start: 2023-10-04 | End: 2023-10-09 | Stop reason: HOSPADM

## 2023-10-04 RX ORDER — OXYCODONE HYDROCHLORIDE 5 MG/1
10 TABLET ORAL EVERY 4 HOURS PRN
Status: DISCONTINUED | OUTPATIENT
Start: 2023-10-04 | End: 2023-10-09 | Stop reason: HOSPADM

## 2023-10-04 RX ORDER — METHOCARBAMOL 500 MG/1
500 TABLET, FILM COATED ORAL EVERY 6 HOURS SCHEDULED
Status: DISCONTINUED | OUTPATIENT
Start: 2023-10-04 | End: 2023-10-09 | Stop reason: HOSPADM

## 2023-10-04 RX ADMIN — ACETAMINOPHEN 975 MG: 325 TABLET, FILM COATED ORAL at 09:17

## 2023-10-04 RX ADMIN — KETOROLAC TROMETHAMINE 15 MG: 30 INJECTION, SOLUTION INTRAMUSCULAR at 15:31

## 2023-10-04 RX ADMIN — SODIUM CHLORIDE 100 ML/HR: 9 INJECTION, SOLUTION INTRAVENOUS at 05:11

## 2023-10-04 RX ADMIN — METHOCARBAMOL 500 MG: 500 TABLET ORAL at 18:32

## 2023-10-04 RX ADMIN — CEFAZOLIN SODIUM 2 G: 2 INJECTION, SOLUTION INTRAVENOUS at 13:05

## 2023-10-04 RX ADMIN — AMLODIPINE BESYLATE 5 MG: 5 TABLET ORAL at 09:02

## 2023-10-04 RX ADMIN — ALLOPURINOL 100 MG: 100 TABLET ORAL at 09:02

## 2023-10-04 RX ADMIN — ATORVASTATIN CALCIUM 10 MG: 10 TABLET, FILM COATED ORAL at 09:17

## 2023-10-04 RX ADMIN — SODIUM BICARBONATE 650 MG TABLET 650 MG: at 11:16

## 2023-10-04 RX ADMIN — Medication 1 TABLET: at 11:16

## 2023-10-04 RX ADMIN — ENOXAPARIN SODIUM 40 MG: 40 INJECTION SUBCUTANEOUS at 03:24

## 2023-10-04 RX ADMIN — SODIUM BICARBONATE 650 MG TABLET 650 MG: at 09:03

## 2023-10-04 RX ADMIN — OXYBUTYNIN CHLORIDE 5 MG: 5 TABLET ORAL at 22:29

## 2023-10-04 RX ADMIN — DOCUSATE SODIUM 100 MG: 100 CAPSULE, LIQUID FILLED ORAL at 09:03

## 2023-10-04 RX ADMIN — DOCUSATE SODIUM 100 MG: 100 CAPSULE, LIQUID FILLED ORAL at 22:40

## 2023-10-04 RX ADMIN — CEFAZOLIN SODIUM 2 G: 2 INJECTION, SOLUTION INTRAVENOUS at 22:32

## 2023-10-04 RX ADMIN — ACETAMINOPHEN 975 MG: 325 TABLET, FILM COATED ORAL at 22:28

## 2023-10-04 RX ADMIN — AMITRIPTYLINE HYDROCHLORIDE 75 MG: 25 TABLET, FILM COATED ORAL at 22:29

## 2023-10-04 RX ADMIN — TAMSULOSIN HYDROCHLORIDE 0.4 MG: 0.4 CAPSULE ORAL at 09:03

## 2023-10-04 RX ADMIN — KETOROLAC TROMETHAMINE 15 MG: 30 INJECTION, SOLUTION INTRAMUSCULAR at 22:30

## 2023-10-04 RX ADMIN — KETOROLAC TROMETHAMINE 15 MG: 30 INJECTION, SOLUTION INTRAMUSCULAR at 09:04

## 2023-10-04 RX ADMIN — CHOLECALCIFEROL TAB 25 MCG (1000 UNIT) 50 MCG: 25 TAB at 09:03

## 2023-10-04 RX ADMIN — POLYETHYLENE GLYCOL 3350 17 G: 17 POWDER, FOR SOLUTION ORAL at 09:02

## 2023-10-04 RX ADMIN — SODIUM BICARBONATE 650 MG TABLET 650 MG: at 18:33

## 2023-10-04 RX ADMIN — ACETAMINOPHEN 975 MG: 325 TABLET, FILM COATED ORAL at 15:33

## 2023-10-04 RX ADMIN — ENOXAPARIN SODIUM 40 MG: 40 INJECTION SUBCUTANEOUS at 15:32

## 2023-10-04 RX ADMIN — ACETAMINOPHEN 975 MG: 325 TABLET, FILM COATED ORAL at 03:24

## 2023-10-04 RX ADMIN — KETOROLAC TROMETHAMINE 15 MG: 30 INJECTION, SOLUTION INTRAMUSCULAR at 03:23

## 2023-10-04 RX ADMIN — CEFAZOLIN SODIUM 2 G: 2 INJECTION, SOLUTION INTRAVENOUS at 05:07

## 2023-10-04 RX ADMIN — PROGESTERONE 200 MG: 200 CAPSULE ORAL at 22:38

## 2023-10-04 RX ADMIN — OXYBUTYNIN CHLORIDE 5 MG: 5 TABLET ORAL at 09:03

## 2023-10-04 RX ADMIN — OXYBUTYNIN CHLORIDE 5 MG: 5 TABLET ORAL at 15:34

## 2023-10-04 SDOH — ECONOMIC STABILITY: TRANSPORTATION INSECURITY: IN THE PAST 12 MONTHS, HAS LACK OF TRANSPORTATION KEPT YOU FROM MEDICAL APPOINTMENTS OR FROM GETTING MEDICATIONS?: NO

## 2023-10-04 SDOH — ECONOMIC STABILITY: FOOD INSECURITY: WITHIN THE PAST 12 MONTHS, THE FOOD YOU BOUGHT JUST DIDN'T LAST AND YOU DIDN'T HAVE MONEY TO GET MORE.: NEVER TRUE

## 2023-10-04 SDOH — ECONOMIC STABILITY: FOOD INSECURITY: WITHIN THE PAST 12 MONTHS, YOU WORRIED THAT YOUR FOOD WOULD RUN OUT BEFORE YOU GOT THE MONEY TO BUY MORE.: NEVER TRUE

## 2023-10-04 SDOH — ECONOMIC STABILITY: FOOD INSECURITY: WITHIN THE PAST 12 MONTHS, YOU WORRIED THAT YOUR FOOD WOULD RUN OUT BEFORE YOU GOT MONEY TO BUY MORE.: NEVER TRUE

## 2023-10-04 SDOH — ECONOMIC STABILITY: HOUSING INSECURITY
IN THE LAST 12 MONTHS, WAS THERE A TIME WHEN YOU DID NOT HAVE A STEADY PLACE TO SLEEP OR SLEPT IN A SHELTER (INCLUDING NOW)?: NO

## 2023-10-04 SDOH — ECONOMIC STABILITY: HOUSING INSECURITY

## 2023-10-04 SDOH — ECONOMIC STABILITY: FOOD INSECURITY: WITHIN THE PAST 12 MONTHS, THE FOOD YOU BOUGHT JUST DIDN’T LAST AND YOU DIDN’T HAVE MONEY TO GET MORE.: NEVER TRUE

## 2023-10-04 SDOH — ECONOMIC STABILITY: INCOME INSECURITY: IN THE LAST 12 MONTHS, WAS THERE A TIME WHEN YOU WERE NOT ABLE TO PAY THE MORTGAGE OR RENT ON TIME?: NO

## 2023-10-04 SDOH — ECONOMIC STABILITY: TRANSPORTATION INSECURITY
IN THE PAST 12 MONTHS, HAS LACK OF TRANSPORTATION KEPT YOU FROM MEETINGS, WORK, OR FROM GETTING THINGS NEEDED FOR DAILY LIVING?: NO

## 2023-10-04 SDOH — ECONOMIC STABILITY: HOUSING INSECURITY: IN THE LAST 12 MONTHS, HOW MANY PLACES HAVE YOU LIVED?: 1

## 2023-10-04 SDOH — ECONOMIC STABILITY: FOOD INSECURITY

## 2023-10-04 SDOH — ECONOMIC STABILITY: TRANSPORTATION INSECURITY

## 2023-10-04 SDOH — ECONOMIC STABILITY: TRANSPORTATION INSECURITY
IN THE PAST 12 MONTHS, HAS THE LACK OF TRANSPORTATION KEPT YOU FROM MEDICAL APPOINTMENTS OR FROM GETTING MEDICATIONS?: NO

## 2023-10-04 SDOH — ECONOMIC STABILITY: HOUSING INSECURITY: IN THE LAST 12 MONTHS, WAS THERE A TIME WHEN YOU WERE NOT ABLE TO PAY THE MORTGAGE OR RENT ON TIME?: NO

## 2023-10-04 SDOH — ECONOMIC STABILITY: GENERAL

## 2023-10-04 SDOH — ECONOMIC STABILITY: HOUSING INSECURITY: IN THE PAST 12 MONTHS HAS THE ELECTRIC, GAS, OIL, OR WATER COMPANY THREATENED TO SHUT OFF SERVICES IN YOUR HOME?: NO

## 2023-10-04 ASSESSMENT — PAIN SCALES - GENERAL
PAINLEVEL_OUTOF10: 4
PAINLEVEL_OUTOF10: 3
PAINLEVEL_OUTOF10: 0 - NO PAIN

## 2023-10-04 ASSESSMENT — COGNITIVE AND FUNCTIONAL STATUS - GENERAL
DRESSING REGULAR LOWER BODY CLOTHING: TOTAL
DRESSING REGULAR UPPER BODY CLOTHING: A LOT
WALKING IN HOSPITAL ROOM: TOTAL
MOVING TO AND FROM BED TO CHAIR: A LITTLE
DAILY ACTIVITIY SCORE: 14
STANDING UP FROM CHAIR USING ARMS: TOTAL
TOILETING: TOTAL
TURNING FROM BACK TO SIDE WHILE IN FLAT BAD: A LITTLE
MOBILITY SCORE: 13
HELP NEEDED FOR BATHING: A LOT
CLIMB 3 TO 5 STEPS WITH RAILING: TOTAL

## 2023-10-04 ASSESSMENT — SOCIAL DETERMINANTS OF HEALTH (SDOH): IN THE PAST 12 MONTHS, HAS THE ELECTRIC, GAS, OIL, OR WATER COMPANY THREATENED TO SHUT OFF SERVICE IN YOUR HOME?: NO

## 2023-10-04 ASSESSMENT — ACTIVITIES OF DAILY LIVING (ADL): LACK_OF_TRANSPORTATION: NO

## 2023-10-04 ASSESSMENT — PAIN - FUNCTIONAL ASSESSMENT: PAIN_FUNCTIONAL_ASSESSMENT: 0-10

## 2023-10-04 NOTE — PROGRESS NOTES
Ashley Valencia is a 58 y.o. female on day 1 of admission presenting with Gender identity disorder, unspecified.    Subjective   She had an uneventful night. Her pain is well controlled. She is tolerating a regular diet with no nausea/vomiting. She is passing flatus.        Objective     General appearance: alert and oriented, in no acute distress  Eyes: conjunctivae/corneas clear. PERRL, EOM's intact. Fundi benign.  Lungs: clear to auscultation bilaterally  Heart: regular rate and rhythm, S1, S2 normal, no murmur, click, rub or gallop  Abdomen: normal findings: soft, non-tender and laparoscopic sites are clean, dry and intact with dermabond.   Pelvic: Wound VAC to 125mmHg continuous negative pressure with <1/4 full cannister of dark blood. No surrounding erythema, edema, induration or fluctuation. Sun catheter intact with blood-tinged urine output (pt is s/p ureteral stent placement of ureterolithiasis last week)  Extremities: extremities normal, warm and well-perfused; no cyanosis, clubbing, or edema  Skin: Skin color, texture, turgor normal. No rashes or lesions  Neurologic: Alert and oriented X 3, normal strength and tone. Normal symmetric reflexes. Normal coordination and gait    Last Recorded Vitals  Blood pressure 120/68, pulse 94, temperature 37.2 °C (99 °F), temperature source Temporal, resp. rate 18, height 1.829 m (6'), weight 84.5 kg (186 lb 4.6 oz), SpO2 95 %.  Intake/Output last 3 Shifts:  I/O last 3 completed shifts:  In: 2700 (32 mL/kg) [I.V.:1000 (11.8 mL/kg); IV Piggyback:1700]  Out: 350 (4.1 mL/kg) [Urine:300 (0.1 mL/kg/hr); Blood:50]  Weight: 84.5 kg     Relevant Results  Scheduled medications  acetaminophen, 975 mg, oral, q6h  allopurinol, 100 mg, oral, Daily  amitriptyline, 75 mg, oral, Nightly  amLODIPine, 5 mg, oral, Daily  atorvastatin, 10 mg, oral, Daily  B complex-vitamin C, 1 tablet, oral, Daily  ceFAZolin, 2 g, intravenous, q8h  cholecalciferol, 50 mcg, oral, Daily  docusate sodium,  100 mg, oral, BID  enoxaparin, 40 mg, subcutaneous, q24h  [START ON 10/5/2023] estradiol valerate, 20 mg, intramuscular, Weekly  ketorolac, 15 mg, intravenous, q6h  lidocaine, 1 patch, transdermal, Daily  methocarbamol, 500 mg, oral, q6h RONALDO  oxybutynin, 5 mg, oral, TID  polyethylene glycol, 17 g, oral, Daily  progesterone, 200 mg, oral, Daily  sodium bicarbonate, 650 mg, oral, TID with meals  tamsulosin, 0.4 mg, oral, Daily      Continuous medications     PRN medications  PRN medications: HYDROmorphone, ondansetron ODT **OR** ondansetron, oxyCODONE, oxyCODONE, rimegepant    Results for orders placed or performed during the hospital encounter of 10/03/23 (from the past 24 hour(s))   CBC   Result Value Ref Range    WBC 8.0 4.4 - 11.3 x10*3/uL    nRBC 0.0 0.0 - 0.0 /100 WBCs    RBC 3.34 (L) 4.00 - 5.20 x10*6/uL    Hemoglobin 10.0 (L) 12.0 - 16.0 g/dL    Hematocrit 30.7 (L) 36.0 - 46.0 %    MCV 92 80 - 100 fL    MCH 29.9 26.0 - 34.0 pg    MCHC 32.6 32.0 - 36.0 g/dL    RDW 13.2 11.5 - 14.5 %    Platelets 152 150 - 450 x10*3/uL    MPV 10.2 7.5 - 11.5 fL   Basic metabolic panel   Result Value Ref Range    Glucose 97 74 - 99 mg/dL    Sodium 137 136 - 145 mmol/L    Potassium 4.4 3.5 - 5.3 mmol/L    Chloride 109 (H) 98 - 107 mmol/L    Bicarbonate 26 21 - 32 mmol/L    Anion Gap <7 (L) 10 - 20 mmol/L    Urea Nitrogen 13 6 - 23 mg/dL    Creatinine 1.04 0.50 - 1.05 mg/dL    eGFR 63 >60 mL/min/1.73m*2    Calcium 7.6 (L) 8.6 - 10.3 mg/dL       CT abdomen pelvis wo IV contrast    Result Date: 9/14/2023  Interpreted By:  PATRICIA MCDUFFIE MD STUDY: CT Abdomen and Pelvis without IV Contrast; 9/14/2023 3:59 AM.  INDICATION: Right renal stone.  COMPARISON: None Available.  ACCESSION NUMBER(S): [id number]  ORDERING CLINICIAN: INNA LIU MD  TECHNIQUE: CT of the abdomen and pelvis was performed.  Contiguous axial images were obtained at 3 mm slice thickness through the abdomen and pelvis. Coronal and sagittal reconstructions at 3 mm  slice thickness were performed. No intravenous contrast was administered.   Automated mA/kV exposure control was utilized and patient examination was performed in strict accordance with principles of ALARA.  FINDINGS: Please note that the evaluation of vessels, lymph nodes and organs is limited without intravenous contrast.  LOWER CHEST: No cardiomegaly.  No pericardial effusion.  Lung bases are clear.  ABDOMEN:  LIVER: No hepatomegaly.  Smooth surface contour.  There are several multiple sharply circumscribed low-density lesions in the liver consistent with simple cysts with the largest approximately 1 cm in diameter in the posterior right lobe of the liver.  BILE DUCTS: No intrahepatic or extrahepatic biliary ductal dilatation.  GALLBLADDER: The gallbladder is unremarkable..  STOMACH: There is a small hiatal hernia but the stomach otherwise is unremarkable..  PANCREAS: No masses or ductal dilatation.  SPLEEN: No splenomegaly or focal splenic lesion.  ADRENAL GLANDS: No thickening or nodules.  KIDNEYS AND URETERS: Kidneys are normal in size and location.  There are nonobstructing calculi in the central kidneys bilaterally with the largest in the lower pole of the left kidney measuring about 1.1 cm in diameter. There is however no hydronephrosis on either side and both ureters appear normal.  PELVIS:  BLADDER: No abnormalities identified.  REPRODUCTIVE ORGANS: There are 2.7 cm oval soft tissue densities in the lower inguinal canal bilaterally.  BOWEL: No abnormalities identified. Appendix appears normal.  VESSELS: No abnormalities identified.  Abdominal aorta is normal in caliber.  PERITONEUM/RETROPERITONEUM/LYMPH NODES: No free fluid.  No pneumoperitoneum.  No lymphadenopathy.  ABDOMINAL WALL: No abnormalities identified.  SOFT TISSUES: No abnormalities identified.  BONES: No acute fracture or aggressive osseous lesion.      There are multiple nonobstructing calculi in the kidneys bilaterally but there does not  appear to be any significant hydronephrosis and both ureters appear normal..   Signed by Tejinder Waller MD                             Assessment/Plan   Principal Problem:    Gender identity disorder, unspecified  Active Problems:    Hyperlipidemia    HTN (hypertension)    Depression    59 yo female with Gender Dysphoria s/p Robotic PPT vaginoplasty with Dr. Lock and Dr. Blake. No acute events overnight.     #Gender Dysphoria  #S/p vaginoplasty   - Bedrest x 48 hours, OOB 10/5  - Regular diet  - mIVF until tolerating regular diet then will discontinue  - Wound VAC, berry and vaginal packing to remain in place until POD 6- will be removed by Primary team  - Continue Ancef while VAC, berry and packing are in place  - Continue bowel regimen  - Continue multimodal pain management  - Add home Estradiol IM injection for tomorrow, takes weekly  - Continue progesterone  - Continue Flomax  - Will add Robaxin for pelvic muscle spasms    #HTN  - Continue home Norvasc  - B/P WNL    #HLD  - Continue home Lipitor    #Migraines  - Continue Elavil  - Add home Nurtec PRN       I spent 30 minutes in the professional and overall care of this patient.      Florina Toth, ANTHONY-CNP

## 2023-10-04 NOTE — CONSULTS
Inpatient consult to Medicine  Consult performed by: SINGH Astudillo  Consult ordered by: SINGH Padilla          Reason For Consult  Medical management after PPT surgery    History Of Present Illness  Ashley Valencia is a 58 y.o. female who is POD #1 PPT surgery.     Past Medical History  She has a past medical history of Calculus of kidney (01/28/2019), Depression, Fibromyalgia, Gender dysphoria, HLD (hyperlipidemia), HTN (hypertension), IBS (irritable bowel syndrome), Migraines, Personal history of colonic polyps (05/07/2021), and Unspecified hydronephrosis (10/08/2020).    Surgical History  She has a past surgical history that includes Hemorrhoid surgery (12/11/2018) and Other surgical history (12/11/2018).     Social History  She reports that she has never smoked. She does not have any smokeless tobacco history on file. She reports that she does not currently use alcohol. She reports that she does not use drugs.    Family History  Family History   Problem Relation Name Age of Onset    Breast cancer Mother      Dementia Mother      Hypertension Mother      Diabetes Other          Allergies  Patient has no known allergies.    Review of Systems  Pt says her pain is controlled. Her pain s/p bilateral ureteral stent placement last week is resolved. She denies nausea.      Objective    Vitals:    10/04/23 0900   BP: 120/68   Pulse: 94   Resp: 18   Temp: 37.2 °C (99 °F)   SpO2: 95%       Vitals:    10/02/23 1140   Weight: 84.5 kg (186 lb 4.6 oz)       Scheduled medications  acetaminophen, 975 mg, oral, q6h  allopurinol, 100 mg, oral, Daily  amitriptyline, 75 mg, oral, Nightly  amLODIPine, 5 mg, oral, Daily  atorvastatin, 10 mg, oral, Daily  B complex-vitamin C, 1 tablet, oral, Daily  ceFAZolin, 2 g, intravenous, q8h  cholecalciferol, 50 mcg, oral, Daily  docusate sodium, 100 mg, oral, BID  enoxaparin, 40 mg, subcutaneous, q24h  ketorolac, 15 mg, intravenous, q6h  lidocaine, 1 patch,  transdermal, Daily  oxybutynin, 5 mg, oral, TID  polyethylene glycol, 17 g, oral, Daily  progesterone, 200 mg, oral, Daily  sodium bicarbonate, 650 mg, oral, TID with meals  tamsulosin, 0.4 mg, oral, Daily      Continuous medications  sodium chloride 0.9%, 100 mL/hr, Last Rate: 100 mL/hr (10/04/23 0700)      PRN medications  PRN medications: HYDROmorphone, ondansetron ODT **OR** ondansetron, oxyCODONE, oxyCODONE    Results for orders placed or performed during the hospital encounter of 10/03/23 (from the past 24 hour(s))   CBC   Result Value Ref Range    WBC 8.0 4.4 - 11.3 x10*3/uL    nRBC 0.0 0.0 - 0.0 /100 WBCs    RBC 3.34 (L) 4.00 - 5.20 x10*6/uL    Hemoglobin 10.0 (L) 12.0 - 16.0 g/dL    Hematocrit 30.7 (L) 36.0 - 46.0 %    MCV 92 80 - 100 fL    MCH 29.9 26.0 - 34.0 pg    MCHC 32.6 32.0 - 36.0 g/dL    RDW 13.2 11.5 - 14.5 %    Platelets 152 150 - 450 x10*3/uL    MPV 10.2 7.5 - 11.5 fL   Basic metabolic panel   Result Value Ref Range    Glucose 97 74 - 99 mg/dL    Sodium 137 136 - 145 mmol/L    Potassium 4.4 3.5 - 5.3 mmol/L    Chloride 109 (H) 98 - 107 mmol/L    Bicarbonate 26 21 - 32 mmol/L    Anion Gap <7 (L) 10 - 20 mmol/L    Urea Nitrogen 13 6 - 23 mg/dL    Creatinine 1.04 0.50 - 1.05 mg/dL    eGFR 63 >60 mL/min/1.73m*2    Calcium 7.6 (L) 8.6 - 10.3 mg/dL       Constitutional: Well developed, awake, alert, oriented  x3, no distress, cooperative, pleasant   Eyes: EOMI, clear sclera   ENMT: mucous membranes moist, no lesions seen   Head/Neck: Neck supple, no apparent injury, head  atraumatic   Respiratory/Thorax: CTAB, good chest expansion, respirations  even and unlabored   Cardiovascular: Regular rate and rhythm, no murmurs/rubs/gallops,  normal S1 and S 2   Gastrointestinal: Abdomen nondistended, soft, nontender,  +BS, no bruits   Musculoskeletal: ROM intact, no joint swelling, normal  strength   Extremities: no cyanosis, edema, contusions or clubbing   Neurological: no focal deficit, pt alert and oriented   x3   Psychological: Appropriate affect and behavior   Skin: Warm and dry, no lesions, no rashes  Genitourinary: Sun draining clear yellow urine       Assessment/Plan  # Gender dysphoria POD #1 PPT surgery     - continue pain and bowel regimen, pt advised to stay ahead of pain     - incentive spirometry 10x/hr while awake     - Sun and wound vac in place  # Tachycardia, improved today, likely pain-mediated  # Bilateral nephrolithiasis s/p bilateral ureteral stent placement 9/26/23     - per surgery, plan to treat the stones at a later date  # HTN, BP controlled on home amlodipine  # HLD, home statin continued  # DVT ppx with Lovenox      Kera Motley, CNP  Hospital Medicine

## 2023-10-04 NOTE — CARE PLAN
Problem: Fall/Injury  Goal: Not fall by end of shift  Outcome: Progressing  Goal: Be free from injury by end of the shift  Outcome: Progressing  Goal: Verbalize understanding of personal risk factors for fall in the hospital  Outcome: Progressing  Goal: Verbalize understanding of risk factor reduction measures to prevent injury from fall in the home  Outcome: Progressing  Goal: Use assistive devices by end of the shift  Outcome: Progressing  Goal: Pace activities to prevent fatigue by end of the shift  Outcome: Progressing     Problem: Pain  Goal: Takes deep breaths with improved pain control throughout the shift  Outcome: Progressing  Goal: Turns in bed with improved pain control throughout the shift  Outcome: Progressing  Goal: Walks with improved pain control throughout the shift  Outcome: Progressing  Goal: Performs ADL's with improved pain control throughout shift  Outcome: Progressing  Goal: Participates in PT with improved pain control throughout the shift  Outcome: Progressing  Goal: Free from opioid side effects throughout the shift  Outcome: Progressing  Goal: Free from acute confusion related to pain meds throughout the shift  Outcome: Progressing     Problem: Pain  Goal: My pain/discomfort is manageable  Outcome: Progressing     Problem: Safety  Goal: Patient will be injury free during hospitalization  Outcome: Progressing  Goal: I will remain free of falls  Outcome: Progressing     Problem: Daily Care  Goal: Daily care needs are met  Outcome: Progressing     Problem: Psychosocial Needs  Goal: Demonstrates ability to cope with hospitalization/illness  Outcome: Progressing  Goal: Collaborate with me, my family, and caregiver to identify my specific goals  Outcome: Progressing     Problem: Discharge Barriers  Goal: My discharge needs are met  Outcome: Progressing     Problem: Infection related to problem list condition  Goal: Infection will resolve through treatment  Outcome: Progressing

## 2023-10-05 PROCEDURE — 2500000005 HC RX 250 GENERAL PHARMACY W/O HCPCS: Performed by: NURSE PRACTITIONER

## 2023-10-05 PROCEDURE — 2500000001 HC RX 250 WO HCPCS SELF ADMINISTERED DRUGS (ALT 637 FOR MEDICARE OP): Performed by: NURSE PRACTITIONER

## 2023-10-05 PROCEDURE — 2500000004 HC RX 250 GENERAL PHARMACY W/ HCPCS (ALT 636 FOR OP/ED): Performed by: NURSE PRACTITIONER

## 2023-10-05 PROCEDURE — 99232 SBSQ HOSP IP/OBS MODERATE 35: CPT | Performed by: NURSE PRACTITIONER

## 2023-10-05 PROCEDURE — 99231 SBSQ HOSP IP/OBS SF/LOW 25: CPT | Performed by: NURSE PRACTITIONER

## 2023-10-05 PROCEDURE — 1100000001 HC PRIVATE ROOM DAILY

## 2023-10-05 PROCEDURE — 2500000002 HC RX 250 W HCPCS SELF ADMINISTERED DRUGS (ALT 637 FOR MEDICARE OP, ALT 636 FOR OP/ED): Performed by: NURSE PRACTITIONER

## 2023-10-05 RX ORDER — AMOXICILLIN 250 MG
2 CAPSULE ORAL 2 TIMES DAILY
Status: DISCONTINUED | OUTPATIENT
Start: 2023-10-05 | End: 2023-10-09 | Stop reason: HOSPADM

## 2023-10-05 RX ORDER — IBUPROFEN 600 MG/1
600 TABLET ORAL EVERY 6 HOURS SCHEDULED
Status: DISCONTINUED | OUTPATIENT
Start: 2023-10-06 | End: 2023-10-09 | Stop reason: HOSPADM

## 2023-10-05 RX ADMIN — CEFAZOLIN SODIUM 2 G: 2 INJECTION, SOLUTION INTRAVENOUS at 12:02

## 2023-10-05 RX ADMIN — CEFAZOLIN SODIUM 2 G: 2 INJECTION, SOLUTION INTRAVENOUS at 21:55

## 2023-10-05 RX ADMIN — METHOCARBAMOL 500 MG: 500 TABLET ORAL at 12:02

## 2023-10-05 RX ADMIN — ATORVASTATIN CALCIUM 10 MG: 10 TABLET, FILM COATED ORAL at 08:53

## 2023-10-05 RX ADMIN — ALLOPURINOL 100 MG: 100 TABLET ORAL at 08:55

## 2023-10-05 RX ADMIN — CHOLECALCIFEROL TAB 25 MCG (1000 UNIT) 50 MCG: 25 TAB at 08:54

## 2023-10-05 RX ADMIN — ACETAMINOPHEN 975 MG: 325 TABLET, FILM COATED ORAL at 21:54

## 2023-10-05 RX ADMIN — KETOROLAC TROMETHAMINE 15 MG: 30 INJECTION, SOLUTION INTRAMUSCULAR at 21:57

## 2023-10-05 RX ADMIN — ACETAMINOPHEN 975 MG: 325 TABLET, FILM COATED ORAL at 03:50

## 2023-10-05 RX ADMIN — TAMSULOSIN HYDROCHLORIDE 0.4 MG: 0.4 CAPSULE ORAL at 08:55

## 2023-10-05 RX ADMIN — KETOROLAC TROMETHAMINE 15 MG: 30 INJECTION, SOLUTION INTRAMUSCULAR at 03:50

## 2023-10-05 RX ADMIN — METHOCARBAMOL 500 MG: 500 TABLET ORAL at 06:32

## 2023-10-05 RX ADMIN — SODIUM BICARBONATE 650 MG TABLET 650 MG: at 17:07

## 2023-10-05 RX ADMIN — KETOROLAC TROMETHAMINE 15 MG: 30 INJECTION, SOLUTION INTRAMUSCULAR at 14:49

## 2023-10-05 RX ADMIN — AMITRIPTYLINE HYDROCHLORIDE 75 MG: 25 TABLET, FILM COATED ORAL at 21:56

## 2023-10-05 RX ADMIN — METHOCARBAMOL 500 MG: 500 TABLET ORAL at 17:08

## 2023-10-05 RX ADMIN — LIDOCAINE 1 PATCH: 4 PATCH TOPICAL at 21:56

## 2023-10-05 RX ADMIN — OXYBUTYNIN CHLORIDE 5 MG: 5 TABLET ORAL at 14:49

## 2023-10-05 RX ADMIN — Medication 1 TABLET: at 08:54

## 2023-10-05 RX ADMIN — METHOCARBAMOL 500 MG: 500 TABLET ORAL at 01:30

## 2023-10-05 RX ADMIN — SENNOSIDES AND DOCUSATE SODIUM 2 TABLET: 50; 8.6 TABLET ORAL at 21:55

## 2023-10-05 RX ADMIN — PROGESTERONE 200 MG: 200 CAPSULE ORAL at 22:14

## 2023-10-05 RX ADMIN — ENOXAPARIN SODIUM 40 MG: 40 INJECTION SUBCUTANEOUS at 14:49

## 2023-10-05 RX ADMIN — KETOROLAC TROMETHAMINE 15 MG: 30 INJECTION, SOLUTION INTRAMUSCULAR at 10:26

## 2023-10-05 RX ADMIN — POLYETHYLENE GLYCOL 3350 17 G: 17 POWDER, FOR SOLUTION ORAL at 08:53

## 2023-10-05 RX ADMIN — SODIUM BICARBONATE 650 MG TABLET 650 MG: at 12:02

## 2023-10-05 RX ADMIN — OXYBUTYNIN CHLORIDE 5 MG: 5 TABLET ORAL at 21:55

## 2023-10-05 RX ADMIN — ACETAMINOPHEN 975 MG: 325 TABLET, FILM COATED ORAL at 10:26

## 2023-10-05 RX ADMIN — ACETAMINOPHEN 975 MG: 325 TABLET, FILM COATED ORAL at 14:51

## 2023-10-05 RX ADMIN — SODIUM BICARBONATE 650 MG TABLET 650 MG: at 08:53

## 2023-10-05 RX ADMIN — CEFAZOLIN SODIUM 2 G: 2 INJECTION, SOLUTION INTRAVENOUS at 06:33

## 2023-10-05 NOTE — PROGRESS NOTES
Subjective  Pt OOB in chair, eating lunch. She has minimal pelvic pain, no SOB or nausea.      Objective    Vitals:    10/05/23 0822   BP: 141/83   Pulse: 87   Resp: 16   Temp: 36.6 °C (97.9 °F)   SpO2: 97%       Vitals:    10/02/23 1140   Weight: 84.5 kg (186 lb 4.6 oz)       Scheduled medications  acetaminophen, 975 mg, oral, q6h  allopurinol, 100 mg, oral, Daily  amitriptyline, 75 mg, oral, Nightly  amLODIPine, 5 mg, oral, Daily  atorvastatin, 10 mg, oral, Daily  B complex-vitamin C, 1 tablet, oral, Daily  ceFAZolin, 2 g, intravenous, q8h  cholecalciferol, 50 mcg, oral, Daily  enoxaparin, 40 mg, subcutaneous, q24h  estradiol valerate, 20 mg, intramuscular, Weekly  [START ON 10/6/2023] ibuprofen, 600 mg, oral, q6h RONALDO  ketorolac, 15 mg, intravenous, q6h  lidocaine, 1 patch, transdermal, Daily  methocarbamol, 500 mg, oral, q6h RONALDO  oxybutynin, 5 mg, oral, TID  polyethylene glycol, 17 g, oral, Daily  progesterone, 200 mg, oral, Daily  sennosides-docusate sodium, 2 tablet, oral, BID  sodium bicarbonate, 650 mg, oral, TID with meals  tamsulosin, 0.4 mg, oral, Daily      Continuous medications     PRN medications  PRN medications: HYDROmorphone, ondansetron ODT **OR** ondansetron, oxyCODONE, oxyCODONE, rimegepant    No results found for this or any previous visit (from the past 24 hour(s)).    Constitutional: Well developed, awake, alert, oriented  x3, no distress, cooperative, pleasant   Eyes: EOMI, clear sclera   ENMT: mucous membranes moist, no lesions seen   Head/Neck: Neck supple, no apparent injury, head  atraumatic   Respiratory/Thorax: CTAB, good chest expansion, respirations  even and unlabored   Cardiovascular: Regular rate and rhythm, no murmurs/rubs/gallops,  normal S1 and S 2   Gastrointestinal: Abdomen nondistended, soft, nontender,  +BS, no bruits   Musculoskeletal: ROM intact, no joint swelling, normal  strength   Extremities: no cyanosis, edema, contusions or clubbing   Neurological: no focal deficit,  pt alert and oriented  x3   Psychological: Appropriate affect and behavior   Skin: Warm and dry, no lesions, no rashes  Genitourinary: Sun draining clear yellow urine, wound vac with clear dark brown output       Past Medical History:   Diagnosis Date    Calculus of kidney 01/28/2019    Kidney stone on left side    Depression     Fibromyalgia     Gender dysphoria     HLD (hyperlipidemia)     HTN (hypertension)     IBS (irritable bowel syndrome)     Migraines     Personal history of colonic polyps 05/07/2021    History of colonic polyps    Unspecified hydronephrosis 10/08/2020    Hydronephrosis, left        Assessment/Plan  # Gender dysphoria POD #2 PPT surgery     - continue pain and bowel regimen, pt advised to stay ahead of pain     - incentive spirometry 10x/hr while awake     - Sun and wound vac in place  # Bilateral nephrolithiasis s/p bilateral ureteral stent placement 9/26/23     - per surgery, plan to treat the stones at a later date  # HTN, BP controlled on home amlodipine  # HLD, home statin continued  # DVT ppx with Lovenox      Kera Motley, CNP  Hospital Medicine

## 2023-10-05 NOTE — CARE PLAN
The patient's goals for the shift include  comfort    The clinical goals for the shift include comfort    Over the shift, the patient did not make progress toward the following goals. Barriers to progression include ***. Recommendations to address these barriers include ***.

## 2023-10-05 NOTE — PROGRESS NOTES
Ashley Valencia is a 58 y.o. female on day 2 of admission presenting with Gender identity disorder, unspecified.    Subjective   No acute events overnight. VSS. Pain is well controlled. She slept much better and able to change positions. The abdominal and shoulder gas has greatly improved. Passing flatus, no BM.        Objective     Physical Exam  Constitutional:       Appearance: Normal appearance.   HENT:      Mouth/Throat:      Mouth: Mucous membranes are moist.   Eyes:      Pupils: Pupils are equal, round, and reactive to light.   Cardiovascular:      Rate and Rhythm: Normal rate and regular rhythm.   Pulmonary:      Effort: Pulmonary effort is normal.      Breath sounds: Normal breath sounds.   Abdominal:      Palpations: Abdomen is soft.      Comments: Laparoscopic sites are clean, dry and intact with dermabond   Genitourinary:     Comments: Wound VAC intact at 125mmHg continuous negative pressure with 1/2 full cannister of dark blood. No surrounding erythema, edema, induration or fluctuation. Sun catheter intact with clear/yellow urine.   Skin:     General: Skin is warm and dry.   Neurological:      Mental Status: She is alert and oriented to person, place, and time. Mental status is at baseline.   Psychiatric:         Mood and Affect: Mood normal.         Behavior: Behavior normal.         Last Recorded Vitals  Blood pressure 141/83, pulse 87, temperature 36.6 °C (97.9 °F), temperature source Temporal, resp. rate 16, height 1.829 m (6'), weight 84.5 kg (186 lb 4.6 oz), SpO2 97 %.  Intake/Output last 3 Shifts:  I/O last 3 completed shifts:  In: 1300 (15.4 mL/kg) [I.V.:1000 (11.8 mL/kg); IV Piggyback:300]  Out: 2600 (30.8 mL/kg) [Urine:2600 (0.9 mL/kg/hr)]  Weight: 84.5 kg     Relevant Results  Scheduled medications  acetaminophen, 975 mg, oral, q6h  allopurinol, 100 mg, oral, Daily  amitriptyline, 75 mg, oral, Nightly  amLODIPine, 5 mg, oral, Daily  atorvastatin, 10 mg, oral, Daily  B complex-vitamin C, 1  tablet, oral, Daily  ceFAZolin, 2 g, intravenous, q8h  cholecalciferol, 50 mcg, oral, Daily  docusate sodium, 100 mg, oral, BID  enoxaparin, 40 mg, subcutaneous, q24h  estradiol valerate, 20 mg, intramuscular, Weekly  ketorolac, 15 mg, intravenous, q6h  lidocaine, 1 patch, transdermal, Daily  methocarbamol, 500 mg, oral, q6h RONALDO  oxybutynin, 5 mg, oral, TID  polyethylene glycol, 17 g, oral, Daily  progesterone, 200 mg, oral, Daily  sodium bicarbonate, 650 mg, oral, TID with meals  tamsulosin, 0.4 mg, oral, Daily      Continuous medications     PRN medications  PRN medications: HYDROmorphone, ondansetron ODT **OR** ondansetron, oxyCODONE, oxyCODONE, rimegepant    No results found for this or any previous visit (from the past 24 hour(s)).    CT abdomen pelvis wo IV contrast    Result Date: 9/14/2023  Interpreted By:  PATRICIA MCDUFFIE MD STUDY: CT Abdomen and Pelvis without IV Contrast; 9/14/2023 3:59 AM.  INDICATION: Right renal stone.  COMPARISON: None Available.  ACCESSION NUMBER(S): [id number]  ORDERING CLINICIAN: INNA LIU MD  TECHNIQUE: CT of the abdomen and pelvis was performed.  Contiguous axial images were obtained at 3 mm slice thickness through the abdomen and pelvis. Coronal and sagittal reconstructions at 3 mm slice thickness were performed. No intravenous contrast was administered.   Automated mA/kV exposure control was utilized and patient examination was performed in strict accordance with principles of ALARA.  FINDINGS: Please note that the evaluation of vessels, lymph nodes and organs is limited without intravenous contrast.  LOWER CHEST: No cardiomegaly.  No pericardial effusion.  Lung bases are clear.  ABDOMEN:  LIVER: No hepatomegaly.  Smooth surface contour.  There are several multiple sharply circumscribed low-density lesions in the liver consistent with simple cysts with the largest approximately 1 cm in diameter in the posterior right lobe of the liver.  BILE DUCTS: No intrahepatic or  extrahepatic biliary ductal dilatation.  GALLBLADDER: The gallbladder is unremarkable..  STOMACH: There is a small hiatal hernia but the stomach otherwise is unremarkable..  PANCREAS: No masses or ductal dilatation.  SPLEEN: No splenomegaly or focal splenic lesion.  ADRENAL GLANDS: No thickening or nodules.  KIDNEYS AND URETERS: Kidneys are normal in size and location.  There are nonobstructing calculi in the central kidneys bilaterally with the largest in the lower pole of the left kidney measuring about 1.1 cm in diameter. There is however no hydronephrosis on either side and both ureters appear normal.  PELVIS:  BLADDER: No abnormalities identified.  REPRODUCTIVE ORGANS: There are 2.7 cm oval soft tissue densities in the lower inguinal canal bilaterally.  BOWEL: No abnormalities identified. Appendix appears normal.  VESSELS: No abnormalities identified.  Abdominal aorta is normal in caliber.  PERITONEUM/RETROPERITONEUM/LYMPH NODES: No free fluid.  No pneumoperitoneum.  No lymphadenopathy.  ABDOMINAL WALL: No abnormalities identified.  SOFT TISSUES: No abnormalities identified.  BONES: No acute fracture or aggressive osseous lesion.      There are multiple nonobstructing calculi in the kidneys bilaterally but there does not appear to be any significant hydronephrosis and both ureters appear normal..   Signed by Tejinder Waller MD                             Assessment/Plan   Principal Problem:    Gender identity disorder, unspecified  Active Problems:    Hyperlipidemia    HTN (hypertension)    Depression    59 yo female with Gender Dysphoria s/p Robotic PPT vaginoplasty with Dr. Lock and Dr. Blake. No acute events overnight.      #Gender Dysphoria  #S/p vaginoplasty   - OOB 3 times per day and ad cherrie.   - NO sitting in chair at 90 degrees  - Regular diet  - Wound VAC, berry and vaginal packing to remain in place until POD 6- will be removed by Primary team  - Continue Ancef while VAC, berry and packing are in  place  - Continue bowel regimen  > Discontinue Colace  > Add Senna/Colace  > Continue Miralax daily  - Continue multimodal pain management  - Home Estradiol IM injection due today  - Continue progesterone  - Continue Flomax     #HTN  - Continue home Norvasc     #HLD  - Continue home Lipitor     #Migraines  - Continue Elavil  - Nurtec PRN       I spent 15 minutes in the professional and overall care of this patient.      Florina Toth, APRN-CNP

## 2023-10-06 PROCEDURE — 2500000005 HC RX 250 GENERAL PHARMACY W/O HCPCS: Performed by: NURSE PRACTITIONER

## 2023-10-06 PROCEDURE — 1100000001 HC PRIVATE ROOM DAILY

## 2023-10-06 PROCEDURE — 2500000001 HC RX 250 WO HCPCS SELF ADMINISTERED DRUGS (ALT 637 FOR MEDICARE OP): Performed by: NURSE PRACTITIONER

## 2023-10-06 PROCEDURE — 2500000004 HC RX 250 GENERAL PHARMACY W/ HCPCS (ALT 636 FOR OP/ED): Performed by: NURSE PRACTITIONER

## 2023-10-06 PROCEDURE — 99232 SBSQ HOSP IP/OBS MODERATE 35: CPT | Performed by: NURSE PRACTITIONER

## 2023-10-06 PROCEDURE — 2500000004 HC RX 250 GENERAL PHARMACY W/ HCPCS (ALT 636 FOR OP/ED): Performed by: UROLOGY

## 2023-10-06 PROCEDURE — 2500000002 HC RX 250 W HCPCS SELF ADMINISTERED DRUGS (ALT 637 FOR MEDICARE OP, ALT 636 FOR OP/ED): Performed by: NURSE PRACTITIONER

## 2023-10-06 PROCEDURE — 96372 THER/PROPH/DIAG INJ SC/IM: CPT | Performed by: NURSE PRACTITIONER

## 2023-10-06 PROCEDURE — 96372 THER/PROPH/DIAG INJ SC/IM: CPT | Performed by: UROLOGY

## 2023-10-06 RX ORDER — ESTRADIOL VALERATE 20 MG/ML
3 INJECTION INTRAMUSCULAR ONCE
Status: DISCONTINUED | OUTPATIENT
Start: 2023-10-06 | End: 2023-10-06

## 2023-10-06 RX ORDER — IBUPROFEN 600 MG/1
600 TABLET ORAL EVERY 6 HOURS SCHEDULED
Qty: 56 TABLET | Refills: 0 | Status: SHIPPED | OUTPATIENT
Start: 2023-10-06 | End: 2023-10-24

## 2023-10-06 RX ORDER — LIDOCAINE 560 MG/1
1 PATCH PERCUTANEOUS; TOPICAL; TRANSDERMAL DAILY
Qty: 7 PATCH | Refills: 0 | Status: SHIPPED | OUTPATIENT
Start: 2023-10-06 | End: 2023-10-13

## 2023-10-06 RX ORDER — ESTRADIOL VALERATE 20 MG/ML
3 INJECTION INTRAMUSCULAR ONCE
Status: COMPLETED | OUTPATIENT
Start: 2023-10-06 | End: 2023-10-06

## 2023-10-06 RX ORDER — METHOCARBAMOL 500 MG/1
500 TABLET, FILM COATED ORAL EVERY 6 HOURS
Qty: 16 TABLET | Refills: 0 | Status: SHIPPED | OUTPATIENT
Start: 2023-10-06 | End: 2023-11-28 | Stop reason: HOSPADM

## 2023-10-06 RX ORDER — AMOXICILLIN 250 MG
2 CAPSULE ORAL 2 TIMES DAILY
Qty: 14 TABLET | Refills: 0 | Status: SHIPPED | OUTPATIENT
Start: 2023-10-06 | End: 2023-11-06 | Stop reason: ALTCHOICE

## 2023-10-06 RX ORDER — POLYETHYLENE GLYCOL 3350 17 G/17G
17 POWDER, FOR SOLUTION ORAL DAILY
Qty: 14 PACKET | Refills: 0 | Status: SHIPPED | OUTPATIENT
Start: 2023-10-07 | End: 2023-11-06 | Stop reason: ALTCHOICE

## 2023-10-06 RX ORDER — ACETAMINOPHEN 325 MG/1
1000 TABLET ORAL EVERY 6 HOURS
Qty: 168 TABLET | Refills: 0 | OUTPATIENT
Start: 2023-10-06 | End: 2023-10-24

## 2023-10-06 RX ORDER — OXYCODONE HYDROCHLORIDE 5 MG/1
5 TABLET ORAL EVERY 6 HOURS PRN
Qty: 28 TABLET | Refills: 0 | Status: SHIPPED | OUTPATIENT
Start: 2023-10-06 | End: 2023-10-13

## 2023-10-06 RX ADMIN — ATORVASTATIN CALCIUM 10 MG: 10 TABLET, FILM COATED ORAL at 09:47

## 2023-10-06 RX ADMIN — CHOLECALCIFEROL TAB 25 MCG (1000 UNIT) 50 MCG: 25 TAB at 09:46

## 2023-10-06 RX ADMIN — METHOCARBAMOL 500 MG: 500 TABLET ORAL at 01:32

## 2023-10-06 RX ADMIN — KETOROLAC TROMETHAMINE 15 MG: 30 INJECTION, SOLUTION INTRAMUSCULAR at 03:26

## 2023-10-06 RX ADMIN — CEFAZOLIN SODIUM 2 G: 2 INJECTION, SOLUTION INTRAVENOUS at 21:14

## 2023-10-06 RX ADMIN — OXYBUTYNIN CHLORIDE 5 MG: 5 TABLET ORAL at 09:47

## 2023-10-06 RX ADMIN — SODIUM BICARBONATE 650 MG TABLET 650 MG: at 11:55

## 2023-10-06 RX ADMIN — Medication 1 TABLET: at 09:48

## 2023-10-06 RX ADMIN — SENNOSIDES AND DOCUSATE SODIUM 2 TABLET: 50; 8.6 TABLET ORAL at 21:12

## 2023-10-06 RX ADMIN — ACETAMINOPHEN 975 MG: 325 TABLET, FILM COATED ORAL at 03:25

## 2023-10-06 RX ADMIN — ENOXAPARIN SODIUM 40 MG: 40 INJECTION SUBCUTANEOUS at 15:04

## 2023-10-06 RX ADMIN — SODIUM BICARBONATE 650 MG TABLET 650 MG: at 17:17

## 2023-10-06 RX ADMIN — CEFAZOLIN SODIUM 2 G: 2 INJECTION, SOLUTION INTRAVENOUS at 05:37

## 2023-10-06 RX ADMIN — OXYBUTYNIN CHLORIDE 5 MG: 5 TABLET ORAL at 15:04

## 2023-10-06 RX ADMIN — POLYETHYLENE GLYCOL 3350 17 G: 17 POWDER, FOR SOLUTION ORAL at 09:46

## 2023-10-06 RX ADMIN — SODIUM BICARBONATE 650 MG TABLET 650 MG: at 09:46

## 2023-10-06 RX ADMIN — SENNOSIDES AND DOCUSATE SODIUM 2 TABLET: 50; 8.6 TABLET ORAL at 09:47

## 2023-10-06 RX ADMIN — AMITRIPTYLINE HYDROCHLORIDE 75 MG: 25 TABLET, FILM COATED ORAL at 21:14

## 2023-10-06 RX ADMIN — METHOCARBAMOL 500 MG: 500 TABLET ORAL at 05:37

## 2023-10-06 RX ADMIN — METHOCARBAMOL 500 MG: 500 TABLET ORAL at 11:55

## 2023-10-06 RX ADMIN — METHOCARBAMOL 500 MG: 500 TABLET ORAL at 17:17

## 2023-10-06 RX ADMIN — TAMSULOSIN HYDROCHLORIDE 0.4 MG: 0.4 CAPSULE ORAL at 09:47

## 2023-10-06 RX ADMIN — PROGESTERONE 200 MG: 200 CAPSULE ORAL at 21:13

## 2023-10-06 RX ADMIN — ACETAMINOPHEN 975 MG: 325 TABLET, FILM COATED ORAL at 09:55

## 2023-10-06 RX ADMIN — AMLODIPINE BESYLATE 5 MG: 5 TABLET ORAL at 09:47

## 2023-10-06 RX ADMIN — CEFAZOLIN SODIUM 2 G: 2 INJECTION, SOLUTION INTRAVENOUS at 13:15

## 2023-10-06 RX ADMIN — ALLOPURINOL 100 MG: 100 TABLET ORAL at 09:47

## 2023-10-06 RX ADMIN — ESTRADIOL VALERATE 3 MG: 20 INJECTION, SOLUTION INTRAMUSCULAR at 13:35

## 2023-10-06 RX ADMIN — IBUPROFEN 600 MG: 600 TABLET ORAL at 21:11

## 2023-10-06 RX ADMIN — LIDOCAINE 1 PATCH: 4 PATCH TOPICAL at 21:12

## 2023-10-06 RX ADMIN — OXYBUTYNIN CHLORIDE 5 MG: 5 TABLET ORAL at 21:13

## 2023-10-06 RX ADMIN — KETOROLAC TROMETHAMINE 15 MG: 30 INJECTION, SOLUTION INTRAMUSCULAR at 09:54

## 2023-10-06 RX ADMIN — ACETAMINOPHEN 975 MG: 325 TABLET, FILM COATED ORAL at 15:04

## 2023-10-06 ASSESSMENT — PAIN - FUNCTIONAL ASSESSMENT: PAIN_FUNCTIONAL_ASSESSMENT: 0-10

## 2023-10-06 ASSESSMENT — PAIN SCALES - GENERAL
PAINLEVEL_OUTOF10: 3
PAINLEVEL_OUTOF10: 1

## 2023-10-06 NOTE — CARE PLAN
Problem: Fall/Injury  Goal: Not fall by end of shift  Outcome: Progressing  Goal: Be free from injury by end of the shift  Outcome: Progressing  Goal: Verbalize understanding of personal risk factors for fall in the hospital  Outcome: Progressing  Goal: Verbalize understanding of risk factor reduction measures to prevent injury from fall in the home  Outcome: Progressing  Goal: Use assistive devices by end of the shift  Outcome: Progressing  Goal: Pace activities to prevent fatigue by end of the shift  Outcome: Progressing

## 2023-10-06 NOTE — PROGRESS NOTES
Subjective  Pt sitting up at the side of the bed. She has not been OOB to ambulate and was about to take a walk. She denies pain. Last BM was 4 days ago, she usually has at least one BM daily.    Objective    Vitals:    10/06/23 0804   BP: 127/80   Pulse: 81   Resp: 18   Temp: 36.5 °C (97.7 °F)   SpO2: 96%       Vitals:    10/02/23 1140   Weight: 84.5 kg (186 lb 4.6 oz)       Scheduled medications  acetaminophen, 975 mg, oral, q6h  allopurinol, 100 mg, oral, Daily  amitriptyline, 75 mg, oral, Nightly  amLODIPine, 5 mg, oral, Daily  atorvastatin, 10 mg, oral, Daily  B complex-vitamin C, 1 tablet, oral, Daily  ceFAZolin, 2 g, intravenous, q8h  cholecalciferol, 50 mcg, oral, Daily  enoxaparin, 40 mg, subcutaneous, q24h  estradiol valerate, 20 mg, intramuscular, Weekly  ibuprofen, 600 mg, oral, q6h RONALDO  ketorolac, 15 mg, intravenous, q6h  lidocaine, 1 patch, transdermal, Daily  methocarbamol, 500 mg, oral, q6h RONALDO  oxybutynin, 5 mg, oral, TID  polyethylene glycol, 17 g, oral, Daily  progesterone, 200 mg, oral, Daily  sennosides-docusate sodium, 2 tablet, oral, BID  sodium bicarbonate, 650 mg, oral, TID with meals  tamsulosin, 0.4 mg, oral, Daily      Continuous medications     PRN medications  PRN medications: HYDROmorphone, ondansetron ODT **OR** ondansetron, oxyCODONE, oxyCODONE, rimegepant    No results found for this or any previous visit (from the past 24 hour(s)).    Constitutional: Well developed, awake, alert, oriented  x3, no distress, cooperative, pleasant   Eyes: EOMI, clear sclera   ENMT: mucous membranes moist, no lesions seen   Head/Neck: Neck supple, no apparent injury, head  atraumatic   Respiratory/Thorax: CTAB, good chest expansion, respirations  even and unlabored   Cardiovascular: Regular rate and rhythm, no murmurs/rubs/gallops,  normal S1 and S 2   Gastrointestinal: Abdomen nondistended, soft, nontender,  +BS, no bruits   Musculoskeletal: ROM intact, no joint swelling, normal  strength    Extremities: no cyanosis, edema, contusions or clubbing   Neurological: no focal deficit, pt alert and oriented  x3   Psychological: Appropriate affect and behavior   Skin: Warm and dry, no lesions, no rashes  Genitourinary: Sun draining clear yellow urine, wound vac with clear dark brown output       Past Medical History:   Diagnosis Date    Calculus of kidney 01/28/2019    Kidney stone on left side    Depression     Fibromyalgia     Gender dysphoria     HLD (hyperlipidemia)     HTN (hypertension)     IBS (irritable bowel syndrome)     Migraines     Personal history of colonic polyps 05/07/2021    History of colonic polyps    Unspecified hydronephrosis 10/08/2020    Hydronephrosis, left        Assessment/Plan  # Gender dysphoria POD #3 PPT surgery     - continue pain and bowel regimen, pt advised to stay ahead of pain     - incentive spirometry 10x/hr while awake     - Sun and wound vac in place  # Bilateral nephrolithiasis s/p bilateral ureteral stent placement 9/26/23     - per surgery, plan to treat the stones at a later date  # HTN, BP controlled on home amlodipine  # HLD, home statin continued  # DVT ppx with Lovenox      Kera Motley, CNP  Hospital Medicine

## 2023-10-06 NOTE — PROGRESS NOTES
Ashley Valencia is a 58 y.o. female on day 3 of admission presenting with Gender identity disorder, unspecified.    Subjective   No acute events overnight. Pain is well controlled. Still has intermittent gas pain in abd and shoulder but it is tolerable. She is ambulating in halls without assistance. Tolerating a regular diet with N/V. Passing flatus but no bowel movement.        Objective     Physical Exam  Constitutional:       Appearance: Normal appearance.   HENT:      Mouth/Throat:      Mouth: Mucous membranes are moist.   Eyes:      Pupils: Pupils are equal, round, and reactive to light.   Cardiovascular:      Rate and Rhythm: Normal rate and regular rhythm.   Pulmonary:      Effort: Pulmonary effort is normal.      Breath sounds: Normal breath sounds.   Abdominal:      Palpations: Abdomen is soft.      Tenderness: There is abdominal tenderness.      Comments: Laparoscopic incisions are clean, dry and intact with Dermabond   Genitourinary:     Comments: Wound VAC intact at 125mmHg continuous negative pressure with 1/2 full, dark blood in cannister, no erythema, edema, induration or fluctuation. Sun catheter intact with clear/yellow urine.   Skin:     General: Skin is warm and dry.   Neurological:      Mental Status: She is alert and oriented to person, place, and time. Mental status is at baseline.   Psychiatric:         Mood and Affect: Mood normal.         Behavior: Behavior normal.         Last Recorded Vitals  Blood pressure 127/80, pulse 81, temperature 36.5 °C (97.7 °F), temperature source Temporal, resp. rate 18, height 1.829 m (6'), weight 84.5 kg (186 lb 4.6 oz), SpO2 96 %.  Intake/Output last 3 Shifts:  I/O last 3 completed shifts:  In: - (0 mL/kg)   Out: 4750 (56.2 mL/kg) [Urine:4750 (1.6 mL/kg/hr)]  Weight: 84.5 kg     Relevant Results  Scheduled medications  acetaminophen, 975 mg, oral, q6h  allopurinol, 100 mg, oral, Daily  amitriptyline, 75 mg, oral, Nightly  amLODIPine, 5 mg, oral,  Daily  atorvastatin, 10 mg, oral, Daily  B complex-vitamin C, 1 tablet, oral, Daily  ceFAZolin, 2 g, intravenous, q8h  cholecalciferol, 50 mcg, oral, Daily  enoxaparin, 40 mg, subcutaneous, q24h  estradiol valerate, 20 mg, intramuscular, Weekly  ibuprofen, 600 mg, oral, q6h RONALDO  ketorolac, 15 mg, intravenous, q6h  lidocaine, 1 patch, transdermal, Daily  methocarbamol, 500 mg, oral, q6h RONALDO  oxybutynin, 5 mg, oral, TID  polyethylene glycol, 17 g, oral, Daily  progesterone, 200 mg, oral, Daily  sennosides-docusate sodium, 2 tablet, oral, BID  sodium bicarbonate, 650 mg, oral, TID with meals  tamsulosin, 0.4 mg, oral, Daily      Continuous medications     PRN medications  PRN medications: HYDROmorphone, ondansetron ODT **OR** ondansetron, oxyCODONE, oxyCODONE, rimegepant    No results found for this or any previous visit (from the past 24 hour(s)).    CT abdomen pelvis wo IV contrast    Result Date: 9/14/2023  Interpreted By:  PATRICIA MCDUFFIE MD STUDY: CT Abdomen and Pelvis without IV Contrast; 9/14/2023 3:59 AM.  INDICATION: Right renal stone.  COMPARISON: None Available.  ACCESSION NUMBER(S): [id number]  ORDERING CLINICIAN: INNA LIU MD  TECHNIQUE: CT of the abdomen and pelvis was performed.  Contiguous axial images were obtained at 3 mm slice thickness through the abdomen and pelvis. Coronal and sagittal reconstructions at 3 mm slice thickness were performed. No intravenous contrast was administered.   Automated mA/kV exposure control was utilized and patient examination was performed in strict accordance with principles of ALARA.  FINDINGS: Please note that the evaluation of vessels, lymph nodes and organs is limited without intravenous contrast.  LOWER CHEST: No cardiomegaly.  No pericardial effusion.  Lung bases are clear.  ABDOMEN:  LIVER: No hepatomegaly.  Smooth surface contour.  There are several multiple sharply circumscribed low-density lesions in the liver consistent with simple cysts with the  largest approximately 1 cm in diameter in the posterior right lobe of the liver.  BILE DUCTS: No intrahepatic or extrahepatic biliary ductal dilatation.  GALLBLADDER: The gallbladder is unremarkable..  STOMACH: There is a small hiatal hernia but the stomach otherwise is unremarkable..  PANCREAS: No masses or ductal dilatation.  SPLEEN: No splenomegaly or focal splenic lesion.  ADRENAL GLANDS: No thickening or nodules.  KIDNEYS AND URETERS: Kidneys are normal in size and location.  There are nonobstructing calculi in the central kidneys bilaterally with the largest in the lower pole of the left kidney measuring about 1.1 cm in diameter. There is however no hydronephrosis on either side and both ureters appear normal.  PELVIS:  BLADDER: No abnormalities identified.  REPRODUCTIVE ORGANS: There are 2.7 cm oval soft tissue densities in the lower inguinal canal bilaterally.  BOWEL: No abnormalities identified. Appendix appears normal.  VESSELS: No abnormalities identified.  Abdominal aorta is normal in caliber.  PERITONEUM/RETROPERITONEUM/LYMPH NODES: No free fluid.  No pneumoperitoneum.  No lymphadenopathy.  ABDOMINAL WALL: No abnormalities identified.  SOFT TISSUES: No abnormalities identified.  BONES: No acute fracture or aggressive osseous lesion.      There are multiple nonobstructing calculi in the kidneys bilaterally but there does not appear to be any significant hydronephrosis and both ureters appear normal..   Signed by Tejinder Waller MD                             Assessment/Plan   Principal Problem:    Gender identity disorder, unspecified  Active Problems:    Hyperlipidemia    HTN (hypertension)    Depression    57 yo female with Gender Dysphoria s/p Robotic PPT vaginoplasty with Dr. Lock and Dr. Blake. No acute events overnight.      #Gender Dysphoria  #S/p vaginoplasty   - OOB 3 times per day and ad cherrie.   - NO sitting in chair at 90 degrees  - Regular diet  - Wound VAC, berry and vaginal packing to  remain in place until POD 6- will be removed by Primary team  - Continue Ancef while VAC, berry and packing are in place  - Continue bowel regimen  > Add Bisacodyl suppository tomorrow if no BM today  - Continue multimodal pain management  - Home Estradiol IM injection  > Wrong dosage listed in med rec. Patient takes 0.15ml, will correct  - Continue progesterone  - Continue Flomax     #HTN  - Continue home Norvasc  - Blood pressure well controlled     #HLD  - Continue home Lipitor     #Migraines  - Continue Elavil  - Nurtec PRN    PPx: SCDs and subcutaneous Lovenox    Appreciate med team recs       I spent 30 minutes in the professional and overall care of this patient.      Florina Toth, APRN-CNP

## 2023-10-07 PROCEDURE — 2500000002 HC RX 250 W HCPCS SELF ADMINISTERED DRUGS (ALT 637 FOR MEDICARE OP, ALT 636 FOR OP/ED): Performed by: NURSE PRACTITIONER

## 2023-10-07 PROCEDURE — 99231 SBSQ HOSP IP/OBS SF/LOW 25: CPT | Performed by: REGISTERED NURSE

## 2023-10-07 PROCEDURE — S0119 ONDANSETRON 4 MG: HCPCS | Performed by: NURSE PRACTITIONER

## 2023-10-07 PROCEDURE — 96372 THER/PROPH/DIAG INJ SC/IM: CPT | Performed by: NURSE PRACTITIONER

## 2023-10-07 PROCEDURE — 1100000001 HC PRIVATE ROOM DAILY

## 2023-10-07 PROCEDURE — 99232 SBSQ HOSP IP/OBS MODERATE 35: CPT | Performed by: NURSE PRACTITIONER

## 2023-10-07 PROCEDURE — 2500000005 HC RX 250 GENERAL PHARMACY W/O HCPCS: Performed by: NURSE PRACTITIONER

## 2023-10-07 PROCEDURE — 2500000001 HC RX 250 WO HCPCS SELF ADMINISTERED DRUGS (ALT 637 FOR MEDICARE OP): Performed by: NURSE PRACTITIONER

## 2023-10-07 PROCEDURE — 2500000004 HC RX 250 GENERAL PHARMACY W/ HCPCS (ALT 636 FOR OP/ED): Performed by: NURSE PRACTITIONER

## 2023-10-07 RX ORDER — BISACODYL 10 MG/1
10 SUPPOSITORY RECTAL ONCE
Status: COMPLETED | OUTPATIENT
Start: 2023-10-07 | End: 2023-10-07

## 2023-10-07 RX ADMIN — Medication 1 TABLET: at 09:15

## 2023-10-07 RX ADMIN — SENNOSIDES AND DOCUSATE SODIUM 2 TABLET: 50; 8.6 TABLET ORAL at 09:16

## 2023-10-07 RX ADMIN — IBUPROFEN 600 MG: 600 TABLET ORAL at 18:05

## 2023-10-07 RX ADMIN — OXYBUTYNIN CHLORIDE 5 MG: 5 TABLET ORAL at 09:16

## 2023-10-07 RX ADMIN — ACETAMINOPHEN 975 MG: 325 TABLET, FILM COATED ORAL at 22:52

## 2023-10-07 RX ADMIN — CEFAZOLIN SODIUM 2 G: 2 INJECTION, SOLUTION INTRAVENOUS at 07:00

## 2023-10-07 RX ADMIN — METHOCARBAMOL 500 MG: 500 TABLET ORAL at 11:59

## 2023-10-07 RX ADMIN — ONDANSETRON 4 MG: 4 TABLET, ORALLY DISINTEGRATING ORAL at 23:01

## 2023-10-07 RX ADMIN — SODIUM BICARBONATE 650 MG TABLET 650 MG: at 09:16

## 2023-10-07 RX ADMIN — ACETAMINOPHEN 975 MG: 325 TABLET, FILM COATED ORAL at 14:55

## 2023-10-07 RX ADMIN — METHOCARBAMOL 500 MG: 500 TABLET ORAL at 18:05

## 2023-10-07 RX ADMIN — METHOCARBAMOL 500 MG: 500 TABLET ORAL at 01:01

## 2023-10-07 RX ADMIN — BISACODYL 10 MG: 10 SUPPOSITORY RECTAL at 12:00

## 2023-10-07 RX ADMIN — TAMSULOSIN HYDROCHLORIDE 0.4 MG: 0.4 CAPSULE ORAL at 09:16

## 2023-10-07 RX ADMIN — AMLODIPINE BESYLATE 5 MG: 5 TABLET ORAL at 09:16

## 2023-10-07 RX ADMIN — POLYETHYLENE GLYCOL 3350 17 G: 17 POWDER, FOR SOLUTION ORAL at 09:16

## 2023-10-07 RX ADMIN — ALLOPURINOL 100 MG: 100 TABLET ORAL at 09:16

## 2023-10-07 RX ADMIN — OXYBUTYNIN CHLORIDE 5 MG: 5 TABLET ORAL at 22:53

## 2023-10-07 RX ADMIN — LIDOCAINE 1 PATCH: 4 PATCH TOPICAL at 22:51

## 2023-10-07 RX ADMIN — IBUPROFEN 600 MG: 600 TABLET ORAL at 12:00

## 2023-10-07 RX ADMIN — ENOXAPARIN SODIUM 40 MG: 40 INJECTION SUBCUTANEOUS at 14:55

## 2023-10-07 RX ADMIN — ACETAMINOPHEN 975 MG: 325 TABLET, FILM COATED ORAL at 09:24

## 2023-10-07 RX ADMIN — AMITRIPTYLINE HYDROCHLORIDE 75 MG: 25 TABLET, FILM COATED ORAL at 22:55

## 2023-10-07 RX ADMIN — PROGESTERONE 200 MG: 200 CAPSULE ORAL at 11:58

## 2023-10-07 RX ADMIN — IBUPROFEN 600 MG: 600 TABLET ORAL at 07:00

## 2023-10-07 RX ADMIN — CEFAZOLIN SODIUM 2 G: 2 INJECTION, SOLUTION INTRAVENOUS at 14:56

## 2023-10-07 RX ADMIN — CEFAZOLIN SODIUM 2 G: 2 INJECTION, SOLUTION INTRAVENOUS at 23:09

## 2023-10-07 RX ADMIN — OXYBUTYNIN CHLORIDE 5 MG: 5 TABLET ORAL at 14:55

## 2023-10-07 RX ADMIN — ATORVASTATIN CALCIUM 10 MG: 10 TABLET, FILM COATED ORAL at 09:16

## 2023-10-07 RX ADMIN — SODIUM BICARBONATE 650 MG TABLET 650 MG: at 18:15

## 2023-10-07 RX ADMIN — SODIUM BICARBONATE 650 MG TABLET 650 MG: at 11:59

## 2023-10-07 RX ADMIN — CHOLECALCIFEROL TAB 25 MCG (1000 UNIT) 50 MCG: 25 TAB at 09:16

## 2023-10-07 RX ADMIN — METHOCARBAMOL 500 MG: 500 TABLET ORAL at 07:00

## 2023-10-07 ASSESSMENT — PAIN SCALES - GENERAL
PAINLEVEL_OUTOF10: 3
PAINLEVEL_OUTOF10: 2
PAINLEVEL_OUTOF10: 2

## 2023-10-07 ASSESSMENT — PAIN - FUNCTIONAL ASSESSMENT: PAIN_FUNCTIONAL_ASSESSMENT: 0-10

## 2023-10-07 NOTE — CARE PLAN
The patient's goals for the shift include      The clinical goals for the shift include Pain control

## 2023-10-07 NOTE — PROGRESS NOTES
Subjective    Pt OOB in chair. She has had no BM yet, said she was told could get a suppository ordered today if not. She ambulated 12x around the unit today. She denies pain.    Objective    Vitals:    10/07/23 0751   BP: 146/88   Pulse: 94   Resp: 18   Temp: 37.1 °C (98.8 °F)   SpO2: 96%       Vitals:    10/02/23 1140   Weight: 84.5 kg (186 lb 4.6 oz)       Scheduled medications  acetaminophen, 975 mg, oral, q6h  allopurinol, 100 mg, oral, Daily  amitriptyline, 75 mg, oral, Nightly  amLODIPine, 5 mg, oral, Daily  atorvastatin, 10 mg, oral, Daily  B complex-vitamin C, 1 tablet, oral, Daily  ceFAZolin, 2 g, intravenous, q8h  cholecalciferol, 50 mcg, oral, Daily  enoxaparin, 40 mg, subcutaneous, q24h  ibuprofen, 600 mg, oral, q6h RONALDO  lidocaine, 1 patch, transdermal, Daily  methocarbamol, 500 mg, oral, q6h RONALDO  oxybutynin, 5 mg, oral, TID  polyethylene glycol, 17 g, oral, Daily  progesterone, 200 mg, oral, Daily  sennosides-docusate sodium, 2 tablet, oral, BID  sodium bicarbonate, 650 mg, oral, TID with meals  tamsulosin, 0.4 mg, oral, Daily      Continuous medications     PRN medications  PRN medications: HYDROmorphone, ondansetron ODT **OR** ondansetron, oxyCODONE, oxyCODONE, rimegepant    No results found for this or any previous visit (from the past 24 hour(s)).    Constitutional: Well developed, awake, alert, oriented  x3, no distress, cooperative, pleasant   Eyes: EOMI, clear sclera   ENMT: mucous membranes moist, no lesions seen   Head/Neck: Neck supple, no apparent injury, head  atraumatic   Respiratory/Thorax: CTAB, good chest expansion, respirations  even and unlabored   Cardiovascular: Regular rate and rhythm, no murmurs/rubs/gallops,  normal S1 and S 2   Gastrointestinal: Abdomen nondistended, soft, nontender,  +BS, no bruits   Musculoskeletal: ROM intact, no joint swelling, normal  strength   Extremities: no cyanosis, edema, contusions or clubbing   Neurological: no focal deficit, pt alert and oriented   x3   Psychological: Appropriate affect and behavior   Skin: Warm and dry, no lesions, no rashes  Genitourinary: Sun draining clear yellow urine, wound vac with clear dark brown output       Past Medical History:   Diagnosis Date    Calculus of kidney 01/28/2019    Kidney stone on left side    Depression     Fibromyalgia     Gender dysphoria     HLD (hyperlipidemia)     HTN (hypertension)     IBS (irritable bowel syndrome)     Migraines     Personal history of colonic polyps 05/07/2021    History of colonic polyps    Unspecified hydronephrosis 10/08/2020    Hydronephrosis, left        Assessment/Plan  # Gender dysphoria POD #4 PPT surgery     - continue pain and bowel regimen, pt advised to stay ahead of pain     - incentive spirometry 10x/hr while awake     - Sun and wound vac in place  # Bilateral nephrolithiasis s/p bilateral ureteral stent placement 9/26/23     - per surgery, plan to treat the stones at a later date  # HTN, BP controlled on home amlodipine  # HLD, home statin continued  # DVT ppx with Lovenox      Kera Motley, CNP  Hospital Medicine

## 2023-10-07 NOTE — PROGRESS NOTES
Ashley Valencia is a 58 y.o. female on day 4 of admission presenting with Gender identity disorder, unspecified.    Subjective   Patient still passing gas, but has not yet had a BM. [place] she had to go, but was unsuccessful. Denies nausea or feeling bloated.   Ambulated 6 laps around unit in one direction, then laps in the other direction. Denies dizziness or lightheadedness.        Objective     Physical Exam:  Constitutional:         Middle-aged adult patient sitting upright in chair in hospital room, instructed to recline chair so she was not sitting still erect.  No acute distress  HENT:     Mucous membranes moist  Eyes:      Sclera white, anicteric  Cardiovascular:      S1-S2 regular, RRR  Pulmonary:     Comfortable work of breathing on room air, breath sounds clear, thorax symmetric  Abdominal:      Abdomen flat, soft to palpation, nondistended, nontender.  Laparoscopic incisions well approximated with surgical glue.  No ecchymosis surrounding incisions but mild scattered ecchymosis from subcutaneous injections.  Active bowel sounds  :    Symphysis pubis with significant ecchymosis particularly to the right lateral aspect but tissue is very soft without palpated hematoma.  Wound VAC in place holding good suction.  Canister is half full of old dark blood.  Sun catheter in place draining clear yellow urine.  Skin:    Warm and well-perfused, no systemic rash  Extremities:     No lower extremity edema, 2+ pulses, no clubbing of nailbeds  Neurological:      Alert and oriented x3, no deficits  Psychiatric:         Appropriate mood and behaviors    Last Recorded Vitals  Blood pressure 146/88, pulse 94, temperature 37.1 °C (98.8 °F), temperature source Temporal, resp. rate 18, height 1.829 m (6'), weight 84.5 kg (186 lb 4.6 oz), SpO2 96 %.  Intake/Output last 3 Shifts:  I/O last 3 completed shifts:  In: 720 (8.5 mL/kg) [P.O.:720]  Out: 4325 (51.2 mL/kg) [Urine:4325 (1.4 mL/kg/hr)]  Weight: 84.5 kg     Relevant  Results    Scheduled medications  acetaminophen, 975 mg, oral, q6h  allopurinol, 100 mg, oral, Daily  amitriptyline, 75 mg, oral, Nightly  amLODIPine, 5 mg, oral, Daily  atorvastatin, 10 mg, oral, Daily  B complex-vitamin C, 1 tablet, oral, Daily  bisacodyl, 10 mg, rectal, Once  ceFAZolin, 2 g, intravenous, q8h  cholecalciferol, 50 mcg, oral, Daily  enoxaparin, 40 mg, subcutaneous, q24h  ibuprofen, 600 mg, oral, q6h RONALDO  lidocaine, 1 patch, transdermal, Daily  methocarbamol, 500 mg, oral, q6h RONALDO  oxybutynin, 5 mg, oral, TID  polyethylene glycol, 17 g, oral, Daily  progesterone, 200 mg, oral, Daily  sennosides-docusate sodium, 2 tablet, oral, BID  sodium bicarbonate, 650 mg, oral, TID with meals  tamsulosin, 0.4 mg, oral, Daily      Continuous medications     PRN medications  PRN medications: HYDROmorphone, ondansetron ODT **OR** ondansetron, oxyCODONE, oxyCODONE, rimegepant     Assessment/Plan   Principal Problem:    Gender identity disorder, unspecified  Active Problems:    Hyperlipidemia    HTN (hypertension)    Depression    59 yo female with Gender Dysphoria s/p Robotic PPT vaginoplasty with Dr. Lock and Dr. Blake. No acute events overnight. Still has not had a BM; constipated      #Gender Dysphoria  #S/p vaginoplasty   - OOB 3 times per day and ad cherrie.   - NO sitting in chair at 90 degrees; re-educated patient today  - Regular diet  - Wound VAC, berry, and vaginal packing to remain in place until POD 6- will be removed by Primary team  - Continue Ancef while VAC, berry, and packing are in place  - Continue bowel regimen  > 1x Bisacodyl suppository today   - Continue multimodal pain management (scheduled acetaminophen, ibuprofen, Robaxin, and Lidoderm patch).    > PRN oxycodone 5mg and 10mg for moderate/severe pain with PRN IVP hydromorphone for breakthrough pain   - weekly Estradiol IM injection (received yesterday)   - Continue progesterone  - Continue Flomax  - continue oxybutynin      #HTN, h/o Gout  -  Continue home Norvasc and allopurinol   - Blood pressure well controlled     #HLD  - Continue home Lipitor     #Migraines and depression  - Continue amitriptyline   - Nurtec PRN    PPx: SCDs and subcutaneous Lovenox    Appreciate med team recs    Dispo:  From [place]. Discharge home Monday post-berry removal and TOV. Discharge per primary team    ANTHONY Lowry, CNP  Trauma/General Surgery [facility]  Phone: [phone]     I spent 30 minutes in the professional and overall care of this patient.  Greater than 50% of this time was spent counseling patient, reviewing plan of care, and in coordination of care.

## 2023-10-07 NOTE — CARE PLAN
The patient's goals for the shift include pain control (pain control)    The clinical goals for the shift include pain control  ***  Over the shift, the patient did not make progress toward the following goals. Barriers to progression include ***. Recommendations to address these barriers include .

## 2023-10-08 PROCEDURE — 2500000001 HC RX 250 WO HCPCS SELF ADMINISTERED DRUGS (ALT 637 FOR MEDICARE OP): Performed by: NURSE PRACTITIONER

## 2023-10-08 PROCEDURE — 96372 THER/PROPH/DIAG INJ SC/IM: CPT | Performed by: NURSE PRACTITIONER

## 2023-10-08 PROCEDURE — 2500000002 HC RX 250 W HCPCS SELF ADMINISTERED DRUGS (ALT 637 FOR MEDICARE OP, ALT 636 FOR OP/ED): Performed by: NURSE PRACTITIONER

## 2023-10-08 PROCEDURE — 99232 SBSQ HOSP IP/OBS MODERATE 35: CPT | Performed by: NURSE PRACTITIONER

## 2023-10-08 PROCEDURE — 2500000004 HC RX 250 GENERAL PHARMACY W/ HCPCS (ALT 636 FOR OP/ED): Performed by: NURSE PRACTITIONER

## 2023-10-08 PROCEDURE — 1100000001 HC PRIVATE ROOM DAILY

## 2023-10-08 PROCEDURE — 2500000005 HC RX 250 GENERAL PHARMACY W/O HCPCS: Performed by: NURSE PRACTITIONER

## 2023-10-08 PROCEDURE — 99231 SBSQ HOSP IP/OBS SF/LOW 25: CPT | Performed by: REGISTERED NURSE

## 2023-10-08 RX ORDER — BISACODYL 10 MG/1
10 SUPPOSITORY RECTAL DAILY PRN
Status: DISCONTINUED | OUTPATIENT
Start: 2023-10-08 | End: 2023-10-09 | Stop reason: HOSPADM

## 2023-10-08 RX ADMIN — CHOLECALCIFEROL TAB 25 MCG (1000 UNIT) 50 MCG: 25 TAB at 09:13

## 2023-10-08 RX ADMIN — ACETAMINOPHEN 975 MG: 325 TABLET, FILM COATED ORAL at 15:00

## 2023-10-08 RX ADMIN — CEFAZOLIN SODIUM 2 G: 2 INJECTION, SOLUTION INTRAVENOUS at 06:32

## 2023-10-08 RX ADMIN — SODIUM BICARBONATE 650 MG TABLET 650 MG: at 17:03

## 2023-10-08 RX ADMIN — ACETAMINOPHEN 975 MG: 325 TABLET, FILM COATED ORAL at 04:17

## 2023-10-08 RX ADMIN — METHOCARBAMOL 500 MG: 500 TABLET ORAL at 06:33

## 2023-10-08 RX ADMIN — CEFAZOLIN SODIUM 2 G: 2 INJECTION, SOLUTION INTRAVENOUS at 22:05

## 2023-10-08 RX ADMIN — ALLOPURINOL 100 MG: 100 TABLET ORAL at 09:14

## 2023-10-08 RX ADMIN — IBUPROFEN 600 MG: 600 TABLET ORAL at 11:24

## 2023-10-08 RX ADMIN — SENNOSIDES AND DOCUSATE SODIUM 2 TABLET: 50; 8.6 TABLET ORAL at 22:05

## 2023-10-08 RX ADMIN — METHOCARBAMOL 500 MG: 500 TABLET ORAL at 23:14

## 2023-10-08 RX ADMIN — AMLODIPINE BESYLATE 5 MG: 5 TABLET ORAL at 09:13

## 2023-10-08 RX ADMIN — LIDOCAINE 1 PATCH: 4 PATCH TOPICAL at 22:06

## 2023-10-08 RX ADMIN — METHOCARBAMOL 500 MG: 500 TABLET ORAL at 00:07

## 2023-10-08 RX ADMIN — OXYBUTYNIN CHLORIDE 5 MG: 5 TABLET ORAL at 14:59

## 2023-10-08 RX ADMIN — CEFAZOLIN SODIUM 2 G: 2 INJECTION, SOLUTION INTRAVENOUS at 12:41

## 2023-10-08 RX ADMIN — PROGESTERONE 200 MG: 200 CAPSULE ORAL at 21:00

## 2023-10-08 RX ADMIN — Medication 1 TABLET: at 09:14

## 2023-10-08 RX ADMIN — OXYBUTYNIN CHLORIDE 5 MG: 5 TABLET ORAL at 09:13

## 2023-10-08 RX ADMIN — ATORVASTATIN CALCIUM 10 MG: 10 TABLET, FILM COATED ORAL at 09:13

## 2023-10-08 RX ADMIN — IBUPROFEN 600 MG: 600 TABLET ORAL at 00:08

## 2023-10-08 RX ADMIN — METHOCARBAMOL 500 MG: 500 TABLET ORAL at 11:24

## 2023-10-08 RX ADMIN — SENNOSIDES AND DOCUSATE SODIUM 2 TABLET: 50; 8.6 TABLET ORAL at 09:13

## 2023-10-08 RX ADMIN — SODIUM BICARBONATE 650 MG TABLET 650 MG: at 11:23

## 2023-10-08 RX ADMIN — IBUPROFEN 600 MG: 600 TABLET ORAL at 23:14

## 2023-10-08 RX ADMIN — ACETAMINOPHEN 975 MG: 325 TABLET, FILM COATED ORAL at 22:05

## 2023-10-08 RX ADMIN — ENOXAPARIN SODIUM 40 MG: 40 INJECTION SUBCUTANEOUS at 14:59

## 2023-10-08 RX ADMIN — AMITRIPTYLINE HYDROCHLORIDE 75 MG: 25 TABLET, FILM COATED ORAL at 22:06

## 2023-10-08 RX ADMIN — POLYETHYLENE GLYCOL 3350 17 G: 17 POWDER, FOR SOLUTION ORAL at 09:13

## 2023-10-08 RX ADMIN — IBUPROFEN 600 MG: 600 TABLET ORAL at 17:05

## 2023-10-08 RX ADMIN — SODIUM BICARBONATE 650 MG TABLET 650 MG: at 09:13

## 2023-10-08 RX ADMIN — RIMEGEPANT SULFATE 75 MG: 75 TABLET, ORALLY DISINTEGRATING ORAL at 07:30

## 2023-10-08 RX ADMIN — TAMSULOSIN HYDROCHLORIDE 0.4 MG: 0.4 CAPSULE ORAL at 09:13

## 2023-10-08 RX ADMIN — IBUPROFEN 600 MG: 600 TABLET ORAL at 06:33

## 2023-10-08 RX ADMIN — METHOCARBAMOL 500 MG: 500 TABLET ORAL at 17:03

## 2023-10-08 RX ADMIN — OXYBUTYNIN CHLORIDE 5 MG: 5 TABLET ORAL at 22:06

## 2023-10-08 ASSESSMENT — COGNITIVE AND FUNCTIONAL STATUS - GENERAL
WALKING IN HOSPITAL ROOM: A LITTLE
TOILETING: A LITTLE
PERSONAL GROOMING: A LITTLE
DRESSING REGULAR LOWER BODY CLOTHING: A LITTLE
TURNING FROM BACK TO SIDE WHILE IN FLAT BAD: A LITTLE
DAILY ACTIVITIY SCORE: 18
MOVING FROM LYING ON BACK TO SITTING ON SIDE OF FLAT BED WITH BEDRAILS: A LITTLE
HELP NEEDED FOR BATHING: A LITTLE
MOVING TO AND FROM BED TO CHAIR: A LITTLE
DRESSING REGULAR UPPER BODY CLOTHING: A LITTLE
STANDING UP FROM CHAIR USING ARMS: A LITTLE
CLIMB 3 TO 5 STEPS WITH RAILING: A LITTLE
EATING MEALS: A LITTLE
MOBILITY SCORE: 18

## 2023-10-08 ASSESSMENT — PAIN - FUNCTIONAL ASSESSMENT: PAIN_FUNCTIONAL_ASSESSMENT: 0-10

## 2023-10-08 ASSESSMENT — PAIN SCALES - GENERAL
PAINLEVEL_OUTOF10: 2

## 2023-10-08 NOTE — PROGRESS NOTES
Ashley Valencia is a 58 y.o. female on day 5 of admission presenting with Gender identity disorder, unspecified.    Subjective   Patient states nursing noticed a rash across her upper abdomen under breasts - patient denies any itching. This provider does not note any petechial or erythematous rash, but just dry/sensitive skin.  Denies chest pain, SOB, leg pain, or other focal issues. Noticed what she thought was bloody urine this morning after walking in hallway- but now it has cleared up  Had bowel movement yesterday     Objective   Physical Exam:  Constitutional:         Middle-aged adult patient sitting upright in chair in hospital room watching video on tablet, No acute distress  HENT:     Mucous membranes moist  Eyes:      Sclera white, anicteric  Cardiovascular:      S1-S2 regular, RRR  Pulmonary:     Comfortable work of breathing on room air, breath sounds clear, thorax symmetric  Abdominal:      Abdomen flat, soft to palpation, nondistended, nontender.  Laparoscopic incisions well approximated with surgical glue.  No ecchymosis surrounding incisions but mild scattered ecchymosis from subcutaneous injections.  Active bowel sounds.    :    Symphysis pubis with significant ecchymosis particularly to the right lateral aspect but tissue is soft without palpated hematoma.  Wound VAC in place holding good suction.  Canister is half full of old dark blood.  Sun catheter in place draining light chantell urine, but some sediment within tubing was noted. No blood  Skin:    Warm and well-perfused, no systemic rash  Extremities:     No lower extremity edema, 2+ pulses, no clubbing of nailbeds  Neurological:      Alert and oriented x3, no deficits  Psychiatric:         Appropriate mood and behaviors    Last Recorded Vitals  Blood pressure 138/81, pulse 85, temperature 36.7 °C (98.1 °F), resp. rate 18, height 1.829 m (6'), weight 84.5 kg (186 lb 4.6 oz), SpO2 99 %.  Intake/Output last 3 Shifts:  I/O last 3 completed  shifts:  In: 100 (1.2 mL/kg) [IV Piggyback:100]  Out: 3250 (38.5 mL/kg) [Urine:3250 (1.1 mL/kg/hr)]  Weight: 84.5 kg          Assessment/Plan   Principal Problem:    Gender identity disorder, unspecified  Active Problems:    Hyperlipidemia    HTN (hypertension)    Depression    59 yo female with Gender Dysphoria s/p Robotic PPT vaginoplasty with Dr. Lock and Dr. Blake. No acute events overnight. Still has not had a BM; constipated      #Gender Dysphoria  #S/p vaginoplasty   - OOB 3 times per day and ad cherrie.   - NO sitting in chair at 90 degrees; re-educated patient today  - Regular diet  - Wound VAC, berry, and vaginal packing to remain in place until POD 6- will be removed by Primary team  - Continue Ancef while VAC, berry, and packing are in place  - Continue bowel regimen  - Continue multimodal pain management (scheduled acetaminophen, ibuprofen, Robaxin, and Lidoderm patch).    > PRN oxycodone 5mg and 10mg for moderate/severe pain with PRN IVP hydromorphone for breakthrough pain   - weekly Estradiol IM injection (received yesterday)   - Continue progesterone  - Continue Flomax  - continue oxybutynin      #HTN, h/o Gout  - Continue home Norvasc and allopurinol   - Blood pressure well controlled     #HLD  - Continue home Lipitor     #Migraines and depression  - Continue amitriptyline   - Nurtec PRN     PPx: SCDs and subcutaneous Lovenox     Appreciate med team recs     Dispo:  From [facility]. Discharge home Monday post-berry removal and TOV. Discharge per primary team     ANTHONY Lowry, CNP  Trauma/General Surgery [facility]  Phone: [phone]      I spent 30 minutes in the professional and overall care of this patient.  Greater than 50% of this time was spent counseling patient, reviewing plan of care, and in coordination of care.

## 2023-10-08 NOTE — CARE PLAN
The patient's goals for the shift include pain control (pain control)    The clinical goals for the shift include pain control    Over the shift, the patient did not make progress toward the following goals. Barriers to progression include .reluctant to take meds Recommendations to address these barriers include encouragement.

## 2023-10-08 NOTE — PROGRESS NOTES
Subjective    Pt OOB in chair. She had 2 BMs yesterday. She denies pain but says she has pressure over the wound area.    Objective    Vitals:    10/08/23 0821   BP: 131/83   Pulse: 103   Resp: 18   Temp: 36.5 °C (97.7 °F)   SpO2: 97%       Vitals:    10/02/23 1140   Weight: 84.5 kg (186 lb 4.6 oz)       Scheduled medications  acetaminophen, 975 mg, oral, q6h  allopurinol, 100 mg, oral, Daily  amitriptyline, 75 mg, oral, Nightly  amLODIPine, 5 mg, oral, Daily  atorvastatin, 10 mg, oral, Daily  B complex-vitamin C, 1 tablet, oral, Daily  ceFAZolin, 2 g, intravenous, q8h  cholecalciferol, 50 mcg, oral, Daily  enoxaparin, 40 mg, subcutaneous, q24h  ibuprofen, 600 mg, oral, q6h RONALDO  lidocaine, 1 patch, transdermal, Daily  methocarbamol, 500 mg, oral, q6h RONALDO  oxybutynin, 5 mg, oral, TID  polyethylene glycol, 17 g, oral, Daily  progesterone, 200 mg, oral, Daily  sennosides-docusate sodium, 2 tablet, oral, BID  sodium bicarbonate, 650 mg, oral, TID with meals  tamsulosin, 0.4 mg, oral, Daily      Continuous medications     PRN medications  PRN medications: bisacodyl, HYDROmorphone, ondansetron ODT **OR** ondansetron, oxyCODONE, oxyCODONE, rimegepant    No results found for this or any previous visit (from the past 24 hour(s)).    Constitutional: Well developed, awake, alert, oriented  x3, no distress, cooperative, pleasant   Eyes: EOMI, clear sclera   ENMT: mucous membranes moist, no lesions seen   Head/Neck: Neck supple, no apparent injury, head  atraumatic   Respiratory/Thorax: CTAB, good chest expansion, respirations  even and unlabored   Cardiovascular: Regular rate and rhythm, no murmurs/rubs/gallops,  normal S1 and S 2   Gastrointestinal: Abdomen nondistended, soft, nontender,  +BS, no bruits   Musculoskeletal: ROM intact, no joint swelling, normal  strength   Extremities: no cyanosis, edema, contusions or clubbing   Neurological: no focal deficit, pt alert and oriented  x3   Psychological: Appropriate affect and  behavior   Skin: Warm and dry, no lesions, no rashes  Genitourinary: Sun draining clear yellow urine, wound vac with clear dark brown output       Past Medical History:   Diagnosis Date    Calculus of kidney 01/28/2019    Kidney stone on left side    Depression     Fibromyalgia     Gender dysphoria     HLD (hyperlipidemia)     HTN (hypertension)     IBS (irritable bowel syndrome)     Migraines     Personal history of colonic polyps 05/07/2021    Unspecified hydronephrosis 10/08/2020    Hydronephrosis, left        Assessment/Plan  # Gender dysphoria POD #5 PPT surgery     - continue pain and bowel regimen, pt advised to stay ahead of pain     - incentive spirometry 10x/hr while awake     - Sun and wound vac in place  # Bilateral nephrolithiasis s/p bilateral ureteral stent placement 9/26/23     - per surgery, plan to treat the stones at a later date  # HTN, BP controlled on home amlodipine  # HLD, home statin continued  # DVT ppx with Lovenox      Kera Motley, CNP  Hospital Medicine

## 2023-10-09 VITALS
DIASTOLIC BLOOD PRESSURE: 82 MMHG | HEIGHT: 72 IN | RESPIRATION RATE: 18 BRPM | TEMPERATURE: 97.7 F | WEIGHT: 186.29 LBS | BODY MASS INDEX: 25.23 KG/M2 | OXYGEN SATURATION: 100 % | HEART RATE: 103 BPM | SYSTOLIC BLOOD PRESSURE: 129 MMHG

## 2023-10-09 PROCEDURE — 99232 SBSQ HOSP IP/OBS MODERATE 35: CPT | Performed by: NURSE PRACTITIONER

## 2023-10-09 PROCEDURE — 2500000001 HC RX 250 WO HCPCS SELF ADMINISTERED DRUGS (ALT 637 FOR MEDICARE OP): Performed by: NURSE PRACTITIONER

## 2023-10-09 PROCEDURE — 2500000004 HC RX 250 GENERAL PHARMACY W/ HCPCS (ALT 636 FOR OP/ED): Performed by: NURSE PRACTITIONER

## 2023-10-09 RX ADMIN — POLYETHYLENE GLYCOL 3350 17 G: 17 POWDER, FOR SOLUTION ORAL at 09:44

## 2023-10-09 RX ADMIN — OXYBUTYNIN CHLORIDE 5 MG: 5 TABLET ORAL at 09:46

## 2023-10-09 RX ADMIN — OXYCODONE HYDROCHLORIDE 10 MG: 5 TABLET ORAL at 11:11

## 2023-10-09 RX ADMIN — IBUPROFEN 600 MG: 600 TABLET ORAL at 05:52

## 2023-10-09 RX ADMIN — OXYCODONE HYDROCHLORIDE 5 MG: 5 TABLET ORAL at 07:00

## 2023-10-09 RX ADMIN — ALLOPURINOL 100 MG: 100 TABLET ORAL at 09:44

## 2023-10-09 RX ADMIN — ACETAMINOPHEN 975 MG: 325 TABLET, FILM COATED ORAL at 09:55

## 2023-10-09 RX ADMIN — CHOLECALCIFEROL TAB 25 MCG (1000 UNIT) 50 MCG: 25 TAB at 09:45

## 2023-10-09 RX ADMIN — TAMSULOSIN HYDROCHLORIDE 0.4 MG: 0.4 CAPSULE ORAL at 09:45

## 2023-10-09 RX ADMIN — METHOCARBAMOL 500 MG: 500 TABLET ORAL at 05:52

## 2023-10-09 RX ADMIN — AMLODIPINE BESYLATE 5 MG: 5 TABLET ORAL at 09:46

## 2023-10-09 RX ADMIN — SENNOSIDES AND DOCUSATE SODIUM 2 TABLET: 50; 8.6 TABLET ORAL at 09:44

## 2023-10-09 RX ADMIN — Medication 1 TABLET: at 09:46

## 2023-10-09 RX ADMIN — ATORVASTATIN CALCIUM 10 MG: 10 TABLET, FILM COATED ORAL at 09:55

## 2023-10-09 RX ADMIN — METHOCARBAMOL 500 MG: 500 TABLET ORAL at 11:11

## 2023-10-09 RX ADMIN — CEFAZOLIN SODIUM 2 G: 2 INJECTION, SOLUTION INTRAVENOUS at 05:53

## 2023-10-09 RX ADMIN — SODIUM BICARBONATE 650 MG TABLET 650 MG: at 09:43

## 2023-10-09 RX ADMIN — SODIUM BICARBONATE 650 MG TABLET 650 MG: at 11:11

## 2023-10-09 SDOH — SOCIAL STABILITY: SOCIAL NETWORK: ARE YOU MARRIED, WIDOWED, DIVORCED, SEPARATED, NEVER MARRIED, OR LIVING WITH A PARTNER?: MARRIED

## 2023-10-09 SDOH — SOCIAL STABILITY: SOCIAL INSECURITY: WITHIN THE LAST YEAR, HAVE YOU BEEN HUMILIATED OR EMOTIONALLY ABUSED IN OTHER WAYS BY YOUR PARTNER OR EX-PARTNER?: NO

## 2023-10-09 SDOH — ECONOMIC STABILITY: INCOME INSECURITY: IN THE PAST 12 MONTHS, HAS THE ELECTRIC, GAS, OIL, OR WATER COMPANY THREATENED TO SHUT OFF SERVICE IN YOUR HOME?: NO

## 2023-10-09 SDOH — SOCIAL STABILITY: SOCIAL INSECURITY: WITHIN THE LAST YEAR, HAVE YOU BEEN AFRAID OF YOUR PARTNER OR EX-PARTNER?: NO

## 2023-10-09 SDOH — ECONOMIC STABILITY: INCOME INSECURITY: IN THE LAST 12 MONTHS, WAS THERE A TIME WHEN YOU WERE NOT ABLE TO PAY THE MORTGAGE OR RENT ON TIME?: NO

## 2023-10-09 SDOH — HEALTH STABILITY: PHYSICAL HEALTH: ON AVERAGE, HOW MANY DAYS PER WEEK DO YOU ENGAGE IN MODERATE TO STRENUOUS EXERCISE (LIKE A BRISK WALK)?: 0 DAYS

## 2023-10-09 SDOH — SOCIAL STABILITY: SOCIAL INSECURITY
WITHIN THE LAST YEAR, HAVE YOU BEEN KICKED, HIT, SLAPPED, OR OTHERWISE PHYSICALLY HURT BY YOUR PARTNER OR EX-PARTNER?: NO

## 2023-10-09 SDOH — SOCIAL STABILITY: SOCIAL INSECURITY
WITHIN THE LAST YEAR, HAVE TO BEEN RAPED OR FORCED TO HAVE ANY KIND OF SEXUAL ACTIVITY BY YOUR PARTNER OR EX-PARTNER?: NO

## 2023-10-09 SDOH — ECONOMIC STABILITY: FOOD INSECURITY: WITHIN THE PAST 12 MONTHS, THE FOOD YOU BOUGHT JUST DIDN'T LAST AND YOU DIDN'T HAVE MONEY TO GET MORE.: NEVER TRUE

## 2023-10-09 SDOH — ECONOMIC STABILITY: HOUSING INSECURITY: IN THE LAST 12 MONTHS, HOW MANY PLACES HAVE YOU LIVED?: 0

## 2023-10-09 SDOH — ECONOMIC STABILITY: FOOD INSECURITY: WITHIN THE PAST 12 MONTHS, YOU WORRIED THAT YOUR FOOD WOULD RUN OUT BEFORE YOU GOT MONEY TO BUY MORE.: NEVER TRUE

## 2023-10-09 SDOH — HEALTH STABILITY: PHYSICAL HEALTH: ON AVERAGE, HOW MANY MINUTES DO YOU ENGAGE IN EXERCISE AT THIS LEVEL?: 0 MIN

## 2023-10-09 SDOH — ECONOMIC STABILITY: INCOME INSECURITY: HOW HARD IS IT FOR YOU TO PAY FOR THE VERY BASICS LIKE FOOD, HOUSING, MEDICAL CARE, AND HEATING?: NOT HARD AT ALL

## 2023-10-09 ASSESSMENT — PAIN SCALES - GENERAL
PAINLEVEL_OUTOF10: 7
PAINLEVEL_OUTOF10: 2

## 2023-10-09 NOTE — PROGRESS NOTES
Subjective    Pt OOB in chair eating lunch. She just completed dilator therapy after wound vac and Sun removed. She denies pain.    Objective    Vitals:    10/09/23 0700   BP: 129/82   Pulse: 103   Resp: 18   Temp: 36.5 °C (97.7 °F)   SpO2: 100%       Vitals:    10/02/23 1140   Weight: 84.5 kg (186 lb 4.6 oz)       Scheduled medications  acetaminophen, 975 mg, oral, q6h  allopurinol, 100 mg, oral, Daily  amitriptyline, 75 mg, oral, Nightly  amLODIPine, 5 mg, oral, Daily  atorvastatin, 10 mg, oral, Daily  B complex-vitamin C, 1 tablet, oral, Daily  ceFAZolin, 2 g, intravenous, q8h  cholecalciferol, 50 mcg, oral, Daily  enoxaparin, 40 mg, subcutaneous, q24h  ibuprofen, 600 mg, oral, q6h RONALDO  lidocaine, 1 patch, transdermal, Daily  methocarbamol, 500 mg, oral, q6h RONALDO  oxybutynin, 5 mg, oral, TID  polyethylene glycol, 17 g, oral, Daily  progesterone, 200 mg, oral, Daily  sennosides-docusate sodium, 2 tablet, oral, BID  sodium bicarbonate, 650 mg, oral, TID with meals  tamsulosin, 0.4 mg, oral, Daily      Continuous medications     PRN medications  PRN medications: bisacodyl, HYDROmorphone, ondansetron ODT **OR** ondansetron, oxyCODONE, oxyCODONE, rimegepant    No results found for this or any previous visit (from the past 24 hour(s)).    Constitutional: Well developed, awake, alert, oriented  x3, no distress, cooperative, pleasant   Eyes: EOMI, clear sclera   ENMT: mucous membranes moist, no lesions seen   Head/Neck: Neck supple, no apparent injury, head  atraumatic   Respiratory/Thorax: CTAB, good chest expansion, respirations  even and unlabored   Cardiovascular: Regular rate and rhythm, no murmurs/rubs/gallops,  normal S1 and S 2   Gastrointestinal: Abdomen nondistended, soft, nontender,  +BS, no bruits   Musculoskeletal: ROM intact, no joint swelling, normal  strength   Extremities: no cyanosis, edema, contusions or clubbing   Neurological: no focal deficit, pt alert and oriented  x3   Psychological: Appropriate  affect and behavior   Skin: Warm and dry, no lesions, no rashes       Past Medical History:   Diagnosis Date    Calculus of kidney 01/28/2019    Kidney stone on left side    Depression     Fibromyalgia     Gender dysphoria     HLD (hyperlipidemia)     HTN (hypertension)     IBS (irritable bowel syndrome)     Migraines     Personal history of colonic polyps 05/07/2021    Unspecified hydronephrosis 10/08/2020    Hydronephrosis, left        Assessment/Plan  # Gender dysphoria POD #6 PPT surgery     - continue pain and bowel regimen, pt advised to stay ahead of pain     - incentive spirometry 10x/hr while awake     - Sun and wound vac removed today  # Bilateral nephrolithiasis s/p bilateral ureteral stent placement 9/26/23     - per surgery, plan to treat the stones at a later date  # HTN, BP controlled on home amlodipine  # HLD, home statin continued  # DVT ppx with Lovenox      Kera Motley, CNP  Hospital Medicine

## 2023-10-09 NOTE — DISCHARGE SUMMARY
Discharge Diagnosis  Gender identity disorder, unspecified    Issues Requiring Follow-Up  Post-op visit     Test Results Pending At Discharge  Pending Labs       No current pending labs.            Hospital Course   57 yo female with Gender Dysphoria s/p Robotic PPT vaginoplasty with Dr. Lock and Dr. Blake on 10/3. VSS. Pain has been well controlled. She is tolerating a regular diet with no nausea/vomiting. She is moving her bowels and passing flatus. Wound VAC, vaginal packing and berry removed 10/9, intact and tolerated well. Ecchymosis and edema surrounding vagina, but incisions are clean, dry and intact. Area is soft, non-tender. Laparoscopic incisions are clean., dry and intact with dermabond. Written materials regarding vaginoplasty, dilation and douching provided and reviewed. Dilation teaching completed.     Pertinent Physical Exam At Time of Discharge  Physical Exam  Constitutional:       Appearance: Normal appearance.   HENT:      Mouth/Throat:      Mouth: Mucous membranes are moist.   Eyes:      Pupils: Pupils are equal, round, and reactive to light.   Cardiovascular:      Rate and Rhythm: Normal rate and regular rhythm.   Pulmonary:      Effort: Pulmonary effort is normal.      Breath sounds: Normal breath sounds.   Abdominal:      Palpations: Abdomen is soft.      Comments: Laparoscopic incisions are clean, dry and intact with Dermabond   Genitourinary:     Comments: Wound VAC, vaginal packing and berry removed intact and tolerated well. Ecchymosis and edema surrounding vagina, but incisions are clean, dry and intact. Area is soft, non-tender. Laparoscopic incisions are clean., dry and intact with dermabond.   Skin:     General: Skin is warm and dry.   Neurological:      Mental Status: She is alert and oriented to person, place, and time. Mental status is at baseline.   Psychiatric:         Mood and Affect: Mood normal.         Behavior: Behavior normal.         Home Medications     Medication List       START taking these medications     acetaminophen 325 mg tablet; Commonly known as: Tylenol; Take 3 tablets   (975 mg) by mouth every 6 hours for 14 days.   ibuprofen 600 mg tablet; Take 1 tablet (600 mg) by mouth every 6 hours   for 14 days.   lidocaine 4 % patch; Place 1 patch over 12 hours on the skin once daily   for 7 days. Remove & discard patch within 12 hours then apply new patch 12   hours later   methocarbamol 500 mg tablet; Commonly known as: Robaxin; Take 1 tablet   (500 mg) by mouth every 6 hours for 4 days.   oxyCODONE 5 mg immediate release tablet; Commonly known as: Roxicodone;   Take 1 tablet (5 mg) by mouth every 6 hours if needed for severe pain (7 -   10) for up to 7 days.   polyethylene glycol 17 gram packet; Commonly known as: Glycolax,   Miralax; Take 17 g by mouth once daily. Do not start before October 7, 2023.   sennosides-docusate sodium 8.6-50 mg tablet; Commonly known as:   Mary-Colace; Take 2 tablets by mouth 2 times a day.     CONTINUE taking these medications     allopurinol 100 mg tablet; Commonly known as: Zyloprim   amitriptyline 25 mg tablet; Commonly known as: Elavil   amLODIPine 5 mg tablet; Commonly known as: Norvasc   atorvastatin 10 mg tablet; Commonly known as: Lipitor   BACILLUS COAGULANS-INULIN ORAL   cholecalciferol 50 mcg (2,000 unit) capsule; Commonly known as: Vitamin   D-3   estradiol valerate 20 mg/mL injection; Commonly known as: Delestrogen   Nurtec ODT 75 mg tablet,disintegrating; Generic drug: rimegepant   progesterone 200 mg capsule; Commonly known as: Prometrium   sodium bicarbonate 650 mg tablet   tamsulosin 0.4 mg 24 hr capsule; Commonly known as: Flomax   Vitamins B Complex tablet; Generic drug: vitamin B complex     STOP taking these medications     oxybutynin 5 mg tablet; Commonly known as: Ditropan       Outpatient Follow-Up  Future Appointments   Date Time Provider Department Center   10/16/2023  8:30 AM ANTHONY Worthy-CNP [id number]        Florina Toth, APRN-CNP

## 2023-10-09 NOTE — CARE PLAN
The patient's goals for the shift include pain control (pain control)    The clinical goals for the shift include PAIN CONTROL    Over the shift, the patient did not make progress toward the following goals. Barriers to progression include NONE. Recommendations to address these barriers include EDUCATION.     Procedure To Be Performed At Next Visit: Cryotherapy Size Of Lesion In Cm (Optional): 0 Introduction Text (Please End With A Colon): See below: Detail Level: Detailed

## 2023-10-09 NOTE — PROGRESS NOTES
Discussed discharge planning with patient, patient states has help at home if needed, patient denies any home going needs.

## 2023-10-13 PROBLEM — N89.5 VAGINAL STENOSIS: Status: ACTIVE | Noted: 2023-10-13

## 2023-10-13 PROBLEM — N18.9 CRI (CHRONIC RENAL INSUFFICIENCY): Status: ACTIVE | Noted: 2019-02-21

## 2023-10-13 PROBLEM — K92.2 GASTROINTESTINAL HEMORRHAGE: Status: ACTIVE | Noted: 2021-08-25

## 2023-10-13 PROBLEM — M10.9 GOUT: Status: ACTIVE | Noted: 2023-10-13

## 2023-10-13 PROBLEM — G43.909 MIGRAINE, UNSPECIFIED, NOT INTRACTABLE, WITHOUT STATUS MIGRAINOSUS: Status: ACTIVE | Noted: 2023-10-13

## 2023-10-13 PROBLEM — D69.6 THROMBOCYTOPENIA (CMS-HCC): Status: ACTIVE | Noted: 2021-08-25

## 2023-10-13 PROBLEM — N18.30 CKD (CHRONIC KIDNEY DISEASE), STAGE III (MULTI): Status: ACTIVE | Noted: 2023-10-13

## 2023-10-13 RX ORDER — SYRINGE, DISPOSABLE, 1 ML
SYRINGE, EMPTY DISPOSABLE MISCELLANEOUS
COMMUNITY
Start: 2023-09-28

## 2023-10-13 RX ORDER — SURGICAL LUBRICANT
JELLY (GRAM) TOPICAL
COMMUNITY
Start: 2023-05-31

## 2023-10-13 RX ORDER — ACETAMINOPHEN 500 MG
500 TABLET ORAL
COMMUNITY
Start: 2022-09-28 | End: 2023-11-06 | Stop reason: ALTCHOICE

## 2023-10-13 RX ORDER — LIDOCAINE AND PRILOCAINE 25; 25 MG/G; MG/G
CREAM TOPICAL
COMMUNITY
Start: 2023-01-11

## 2023-10-13 RX ORDER — TOPIRAMATE 25 MG/1
50 TABLET ORAL
COMMUNITY
Start: 2018-11-20 | End: 2023-11-06 | Stop reason: ALTCHOICE

## 2023-10-13 RX ORDER — NEEDLES, SAFETY 22GX1 1/2"
NEEDLE, DISPOSABLE MISCELLANEOUS
COMMUNITY
Start: 2023-09-28

## 2023-10-13 RX ORDER — ASPIRIN 81 MG/1
81 TABLET ORAL NIGHTLY
COMMUNITY
Start: 2022-09-29

## 2023-10-13 RX ORDER — NEEDLES, DISPOSABLE 25GX5/8"
NEEDLE, DISPOSABLE MISCELLANEOUS
COMMUNITY
Start: 2023-09-28

## 2023-10-13 RX ORDER — BACITRACIN ZINC AND POLYMYXIN B SULFATE 500; 10000 [USP'U]/G; [USP'U]/G
OINTMENT OPHTHALMIC
COMMUNITY
Start: 2022-09-29 | End: 2023-11-06 | Stop reason: ALTCHOICE

## 2023-10-13 RX ORDER — ISOPROPYL ALCOHOL 70 ML/100ML
SWAB TOPICAL
COMMUNITY
Start: 2023-03-21

## 2023-10-13 RX ORDER — DICLOFENAC SODIUM 10 MG/G
2 GEL TOPICAL 4 TIMES DAILY PRN
COMMUNITY
Start: 2023-02-09 | End: 2023-11-06 | Stop reason: ALTCHOICE

## 2023-10-13 RX ORDER — POTASSIUM CITRATE 10 MEQ/1
10 TABLET, EXTENDED RELEASE ORAL 2 TIMES DAILY
COMMUNITY
Start: 2023-09-26 | End: 2023-12-01 | Stop reason: SDUPTHER

## 2023-10-13 RX ORDER — PROGESTERONE 100 MG/1
100 CAPSULE ORAL
COMMUNITY
Start: 2021-08-25 | End: 2023-10-27 | Stop reason: DRUGHIGH

## 2023-10-13 RX ORDER — METOPROLOL SUCCINATE 100 MG/1
100 TABLET, EXTENDED RELEASE ORAL DAILY
COMMUNITY
Start: 2012-07-23 | End: 2023-10-27 | Stop reason: ALTCHOICE

## 2023-10-13 RX ORDER — PROPRANOLOL HYDROCHLORIDE 80 MG/1
1 CAPSULE, EXTENDED RELEASE ORAL DAILY
COMMUNITY
Start: 2022-08-23 | End: 2023-10-27 | Stop reason: ALTCHOICE

## 2023-10-13 RX ORDER — ACETAMINOPHEN, DIPHENHYDRAMINE HCL, PHENYLEPHRINE HCL 325; 25; 5 MG/1; MG/1; MG/1
1 TABLET ORAL NIGHTLY PRN
COMMUNITY
Start: 2021-11-02

## 2023-10-13 RX ORDER — BICALUTAMIDE 50 MG/1
25 TABLET, FILM COATED ORAL DAILY
COMMUNITY
Start: 2021-11-02 | End: 2023-10-27 | Stop reason: ALTCHOICE

## 2023-10-16 ENCOUNTER — OFFICE VISIT (OUTPATIENT)
Dept: UROLOGY | Facility: CLINIC | Age: 58
End: 2023-10-16
Payer: COMMERCIAL

## 2023-10-16 VITALS
HEART RATE: 96 BPM | HEIGHT: 72 IN | BODY MASS INDEX: 24.52 KG/M2 | DIASTOLIC BLOOD PRESSURE: 91 MMHG | WEIGHT: 181 LBS | SYSTOLIC BLOOD PRESSURE: 139 MMHG

## 2023-10-16 DIAGNOSIS — F64.9 GENDER DYSPHORIA: Primary | ICD-10-CM

## 2023-10-16 PROCEDURE — 99024 POSTOP FOLLOW-UP VISIT: CPT | Performed by: NURSE PRACTITIONER

## 2023-10-16 PROCEDURE — 3075F SYST BP GE 130 - 139MM HG: CPT | Performed by: NURSE PRACTITIONER

## 2023-10-16 PROCEDURE — 3080F DIAST BP >= 90 MM HG: CPT | Performed by: NURSE PRACTITIONER

## 2023-10-16 NOTE — PROGRESS NOTES
GENDER CARE POST-OP     HISTORY OF PRESENT ILLNESS:   Ashley Valencia is a 58 y.o. trans woman s/p PPT vaginoplasty on 10/3/23.    INTERVAL HISTORY:  Returns today for POV  Seem unaccompanied  Using dilators 2, 3, 4, & 5 with good depth; asking about sizing up  Pain managed with acetaminophen & ibuprofen  Asking about management of constipation  Had diarrhea last week, stopped all bowel meds  Now no BM x 2 days; ordered docusate/senna  Would like to resume some activity, eg yoga  Wondering when next surgery for stones will be  Asking about K citrate, tamsulosin    PAST MEDICAL HISTORY:  Past Medical History:   Diagnosis Date    Calculus of kidney 01/28/2019    Kidney stone on left side    Depression     Fibromyalgia     Gender dysphoria     HLD (hyperlipidemia)     HTN (hypertension)     IBS (irritable bowel syndrome)     Migraines     Personal history of colonic polyps 05/07/2021    Unspecified hydronephrosis 10/08/2020    Hydronephrosis, left       PAST SURGICAL HISTORY:  Past Surgical History:   Procedure Laterality Date    HEMORRHOID SURGERY  12/11/2018    Hemorrhoidectomy    OTHER SURGICAL HISTORY  12/11/2018    Colonoscopy        ALLERGIES:   No Known Allergies     MEDICATIONS:     Current Outpatient Medications:     acetaminophen (Tylenol) 325 mg tablet, Take 3 tablets (975 mg) by mouth every 6 hours for 14 days., Disp: 168 tablet, Rfl: 0    acetaminophen (Tylenol) 500 mg tablet, Take 1 tablet (500 mg) by mouth., Disp: , Rfl:     alcohol swabs pads, medicated, , Disp: , Rfl:     allopurinol (Zyloprim) 100 mg tablet, Take 1 tablet (100 mg) by mouth once daily., Disp: , Rfl:     amitriptyline (Elavil) 25 mg tablet, Take 3 tablets (75 mg) by mouth once daily at bedtime., Disp: , Rfl:     amLODIPine (Norvasc) 5 mg tablet, Take 1 tablet (5 mg) by mouth once daily., Disp: , Rfl:     ascorbic acid/collagen hydr (COLLAGEN SKIN RENEWAL ORAL), Take by mouth., Disp: , Rfl:     aspirin 81 mg EC tablet, Take 1 tablet  "(81 mg) by mouth once daily., Disp: , Rfl:     atorvastatin (Lipitor) 10 mg tablet, Take 1 tablet (10 mg) by mouth once daily., Disp: , Rfl:     BACILLUS COAGULANS-INULIN ORAL, Take 1 capsule by mouth once daily., Disp: , Rfl:     BD Luer-Orestes Syringe 1 mL syringe, , Disp: , Rfl:     BD Regular Bevel Needles 18 gauge x 1 1/2\" needle, , Disp: , Rfl:     BD SafetyGlide Syringe 3 mL 23 x 1\" syringe, , Disp: , Rfl:     cholecalciferol (Vitamin D-3) 50 mcg (2,000 unit) capsule, Take 1 capsule (50 mcg) by mouth once daily., Disp: , Rfl:     estradiol valerate (Delestrogen) 20 mg/mL injection, Inject 1 mL (20 mg) into the muscle 1 (one) time per week., Disp: , Rfl:     ibuprofen 600 mg tablet, Take 1 tablet (600 mg) by mouth every 6 hours for 14 days., Disp: 56 tablet, Rfl: 0    melatonin 10 mg tablet, Take 1 tablet (10 mg) by mouth as needed at bedtime., Disp: , Rfl:     metoprolol succinate XL (Toprol-XL) 100 mg 24 hr tablet, Take 1 tablet (100 mg) by mouth once daily., Disp: , Rfl:     MULTIVITAMIN ORAL, Take by mouth., Disp: , Rfl:     Nurtec ODT 75 mg tablet,disintegrating, Take 1 tablet (75 mg) by mouth once daily as needed., Disp: , Rfl:     polyethylene glycol (Glycolax, Miralax) 17 gram packet, Take 17 g by mouth once daily. Do not start before October 7, 2023., Disp: 14 packet, Rfl: 0    potassium citrate CR (Urocit-K-10) 10 mEq ER tablet, Take 1 tablet (10 mEq) by mouth 2 times a day., Disp: , Rfl:     sodium bicarbonate 650 mg tablet, Take 1 tablet (650 mg) by mouth 3 times a day with meals., Disp: , Rfl:     tamsulosin (Flomax) 0.4 mg 24 hr capsule, Take 1 capsule (0.4 mg) by mouth once daily., Disp: , Rfl:     topiramate (Topamax) 25 mg tablet, Take 2 tablets (50 mg) by mouth once daily., Disp: , Rfl:     vitamin B complex (Vitamins B Complex) tablet, Take 1 tablet by mouth once daily., Disp: , Rfl:     bacitracin-polymyxin B (Polysporin) ophthalmic ointment, Apply to incisions twice daily, Disp: , Rfl:     " bicalutamide (Casodex) 50 mg tablet, Take 0.5 tablets (25 mg total) by mouth once daily., Disp: , Rfl:     diclofenac sodium (Voltaren) 1 % gel gel, Apply 0.5 Applications topically 4 times a day as needed., Disp: , Rfl:     lidocaine-prilocaine (Emla) 2.5-2.5 % cream, Apply topically., Disp: , Rfl:     methocarbamol (Robaxin) 500 mg tablet, Take 1 tablet (500 mg) by mouth every 6 hours for 4 days., Disp: 16 tablet, Rfl: 0    progesterone (Prometrium) 100 mg capsule, Take 1 capsule (100 mg) by mouth., Disp: , Rfl:     progesterone (Prometrium) 200 mg capsule, Take 1 capsule (200 mg) by mouth once daily., Disp: , Rfl:     propranolol LA (Inderal LA) 80 mg 24 hr capsule, Take 1 capsule (80 mg) by mouth once daily., Disp: , Rfl:     sennosides-docusate sodium (Mary-Colace) 8.6-50 mg tablet, Take 2 tablets by mouth 2 times a day. (Patient not taking: Reported on 10/16/2023), Disp: 14 tablet, Rfl: 0    Surgilube gel, USE ONE GRAM THREE TIMES A DAY FOR VAGINAL DILATION FOR THREE MONTHS  THEN AS INSTRUCTED., Disp: , Rfl:      SOCIAL HISTORY:  Patient  reports that she has never smoked. She does not have any smokeless tobacco history on file. She reports that she does not currently use alcohol. She reports that she does not use drugs.   Social History     Socioeconomic History    Marital status: Single     Spouse name: Not on file    Number of children: Not on file    Years of education: Not on file    Highest education level: Not on file   Occupational History    Not on file   Tobacco Use    Smoking status: Never    Smokeless tobacco: Not on file   Substance and Sexual Activity    Alcohol use: Not Currently    Drug use: Never    Sexual activity: Not on file   Other Topics Concern    Not on file   Social History Narrative    Not on file     Social Determinants of Health     Financial Resource Strain: Low Risk  (10/9/2023)    Overall Financial Resource Strain (CARDIA)     Difficulty of Paying Living Expenses: Not hard at all    Food Insecurity: No Food Insecurity (10/9/2023)    Hunger Vital Sign     Worried About Running Out of Food in the Last Year: Never true     Ran Out of Food in the Last Year: Never true   Transportation Needs: No Transportation Needs (10/9/2023)    PRAPARE - Transportation     Lack of Transportation (Medical): No     Lack of Transportation (Non-Medical): No   Physical Activity: Inactive (10/9/2023)    Exercise Vital Sign     Days of Exercise per Week: 0 days     Minutes of Exercise per Session: 0 min   Stress: Not on file   Social Connections: Unknown (10/9/2023)    Social Connection and Isolation Panel [NHANES]     Frequency of Communication with Friends and Family: Not on file     Frequency of Social Gatherings with Friends and Family: Not on file     Attends [employer] Services: Not on file     Active Member of Clubs or Organizations: Not on file     Attends Club or Organization Meetings: Not on file     Marital Status:    Intimate Partner Violence: Not At Risk (10/9/2023)    Humiliation, Afraid, Rape, and Kick questionnaire     Fear of Current or Ex-Partner: No     Emotionally Abused: No     Physically Abused: No     Sexually Abused: No   Housing Stability: Low Risk  (10/9/2023)    Housing Stability Vital Sign     Unable to Pay for Housing in the Last Year: No     Number of Places Lived in the Last Year: 0     Unstable Housing in the Last Year: No       FAMILY HISTORY:  Family History   Problem Relation Name Age of Onset    Breast cancer Mother      Dementia Mother      Hypertension Mother      Carpal tunnel syndrome Mother      Osteoarthritis Mother      Diabetes Other      Arthritis Other      Cancer Other      Hypertension Other      Alcohol abuse Other         REVIEW OF SYSTEMS:  Constitutional: Negative for fever and chills.  Eyes: Negative for visual disturbance.   Respiratory: Negative for shortness of breath.    Cardiovascular: Negative for edema.   Gastrointestinal: Negative for nausea and  vomiting.   Genitourinary: See interval history above.  Skin: Negative for rash.   Neurological: Negative for dizziness and numbness.   Psychiatric/Behavioral: Negative for decreased concentration.     PHYSICAL EXAM:  Visit Vitals  BP (!) 139/91   Pulse 96     Constitutional: Patient appears well-developed and well-nourished. No distress.    Head: Normocephalic and atraumatic.    Neck: Normal range of motion.    Cardiovascular: Normal rate.    Pulmonary/Chest: Effort normal. No respiratory distress.   Abdominal: Mildly distended  : See below.  Musculoskeletal: Normal range of motion.    Neurological: Alert and oriented to person, place, and time.  Psychiatric: Normal mood and affect. Thought content normal.      LABORATORY REVIEW:   Lab Results   Component Value Date    BUN 13 10/04/2023    CREATININE 1.04 10/04/2023    EGFR 63 10/04/2023     10/04/2023    K 4.4 10/04/2023     (H) 10/04/2023    CO2 26 10/04/2023    CALCIUM 7.6 (L) 10/04/2023      Lab Results   Component Value Date    WBC 8.0 10/04/2023    RBC 3.34 (L) 10/04/2023    HGB 10.0 (L) 10/04/2023    HCT 30.7 (L) 10/04/2023    MCV 92 10/04/2023    MCH 29.9 10/04/2023    MCHC 32.6 10/04/2023    RDW 13.2 10/04/2023     10/04/2023    MPV 10.2 10/04/2023               Assessment:     Encounter Diagnosis   Name Primary?    Gender dysphoria Yes     Ashley Valencia is a 58 y.o. trans woman s/p PPT vaginoplasty on 10/3/23.    Moderate vulvar swelling, large mons  Difficult to identify clitoris, urethra due to swelling  Good vaginal tone     Plan:   Continue dilating and douching as instructed  Discussed local wound care with bacitracin or honey  Reviewed use of heat/ice for pain and swelling  Scheduled NSAID and/or acetaminophen for swelling  Methocarbamol 500 mg one hour prior to dilation for pelvic floor tension  Measures to prevent constipation in the post-op period  Scheduled to begin pelvic floor PT 11/24  RTC in 2-3 weeks for  reassessment or sooner if needed  Encouraged to call in the interim with any questions, concerns

## 2023-10-23 LAB
ALBUMIN SERPL BCP-MCNC: 4 G/DL (ref 3.4–5)
ALP SERPL-CCNC: 58 U/L (ref 33–120)
ALT SERPL W P-5'-P-CCNC: 11 U/L (ref 7–52)
ANION GAP SERPL CALC-SCNC: 10 MMOL/L (ref 10–20)
APPEARANCE UR: ABNORMAL
AST SERPL W P-5'-P-CCNC: 15 U/L (ref 9–39)
BACTERIA #/AREA URNS AUTO: ABNORMAL /HPF
BASOPHILS # BLD AUTO: 0.03 X10*3/UL (ref 0–0.1)
BASOPHILS NFR BLD AUTO: 0.3 %
BILIRUB SERPL-MCNC: 0.4 MG/DL (ref 0–1.2)
BILIRUB UR STRIP.AUTO-MCNC: NEGATIVE MG/DL
BUN SERPL-MCNC: 29 MG/DL (ref 6–23)
CALCIUM SERPL-MCNC: 9.1 MG/DL (ref 8.6–10.3)
CHLORIDE SERPL-SCNC: 104 MMOL/L (ref 98–107)
CO2 SERPL-SCNC: 25 MMOL/L (ref 21–32)
COLOR UR: ABNORMAL
CREAT SERPL-MCNC: 1.18 MG/DL (ref 0.5–1.3)
EOSINOPHIL # BLD AUTO: 0.19 X10*3/UL (ref 0–0.7)
EOSINOPHIL NFR BLD AUTO: 2.1 %
ERYTHROCYTE [DISTWIDTH] IN BLOOD BY AUTOMATED COUNT: 13.5 % (ref 11.5–14.5)
GFR SERPL CREATININE-BSD FRML MDRD: 72 ML/MIN/1.73M*2
GLUCOSE SERPL-MCNC: 95 MG/DL (ref 74–99)
GLUCOSE UR STRIP.AUTO-MCNC: NEGATIVE MG/DL
HCT VFR BLD AUTO: 38 % (ref 36–52)
HGB BLD-MCNC: 12.3 G/DL (ref 12–17.5)
IMM GRANULOCYTES # BLD AUTO: 0.05 X10*3/UL (ref 0–0.7)
IMM GRANULOCYTES NFR BLD AUTO: 0.6 % (ref 0–0.9)
KETONES UR STRIP.AUTO-MCNC: ABNORMAL MG/DL
LACTATE SERPL-SCNC: 1.3 MMOL/L (ref 0.4–2)
LEUKOCYTE ESTERASE UR QL STRIP.AUTO: ABNORMAL
LYMPHOCYTES # BLD AUTO: 0.89 X10*3/UL (ref 1.2–4.8)
LYMPHOCYTES NFR BLD AUTO: 9.8 %
MCH RBC QN AUTO: 29.4 PG (ref 26–34)
MCHC RBC AUTO-ENTMCNC: 32.4 G/DL (ref 32–36)
MCV RBC AUTO: 91 FL (ref 80–100)
MONOCYTES # BLD AUTO: 1 X10*3/UL (ref 0.1–1)
MONOCYTES NFR BLD AUTO: 11 %
NEUTROPHILS # BLD AUTO: 6.93 X10*3/UL (ref 1.2–7.7)
NEUTROPHILS NFR BLD AUTO: 76.2 %
NITRITE UR QL STRIP.AUTO: NEGATIVE
NRBC BLD-RTO: 0 /100 WBCS (ref 0–0)
PH UR STRIP.AUTO: 6 [PH]
PLATELET # BLD AUTO: 351 X10*3/UL (ref 150–450)
PMV BLD AUTO: 10.3 FL (ref 7.5–11.5)
POTASSIUM SERPL-SCNC: 3.7 MMOL/L (ref 3.5–5.3)
PROT SERPL-MCNC: 6.8 G/DL (ref 6.4–8.2)
PROT UR STRIP.AUTO-MCNC: ABNORMAL MG/DL
RBC # BLD AUTO: 4.18 X10*6/UL (ref 4–5.9)
RBC # UR STRIP.AUTO: ABNORMAL /UL
RBC #/AREA URNS AUTO: >20 /HPF
SODIUM SERPL-SCNC: 135 MMOL/L (ref 136–145)
SP GR UR STRIP.AUTO: 1.02
UROBILINOGEN UR STRIP.AUTO-MCNC: ABNORMAL MG/DL
WBC # BLD AUTO: 9.1 X10*3/UL (ref 4.4–11.3)
WBC #/AREA URNS AUTO: >50 /HPF

## 2023-10-23 PROCEDURE — 99285 EMERGENCY DEPT VISIT HI MDM: CPT | Mod: 25 | Performed by: EMERGENCY MEDICINE

## 2023-10-23 PROCEDURE — 81001 URINALYSIS AUTO W/SCOPE: CPT | Performed by: NURSE PRACTITIONER

## 2023-10-23 PROCEDURE — 85025 COMPLETE CBC W/AUTO DIFF WBC: CPT | Performed by: NURSE PRACTITIONER

## 2023-10-23 PROCEDURE — 87086 URINE CULTURE/COLONY COUNT: CPT | Mod: CMCLAB,PARLAB | Performed by: NURSE PRACTITIONER

## 2023-10-23 PROCEDURE — 87186 SC STD MICRODIL/AGAR DIL: CPT | Mod: PARLAB | Performed by: NURSE PRACTITIONER

## 2023-10-23 PROCEDURE — 36415 COLL VENOUS BLD VENIPUNCTURE: CPT | Performed by: NURSE PRACTITIONER

## 2023-10-23 PROCEDURE — 80053 COMPREHEN METABOLIC PANEL: CPT | Performed by: NURSE PRACTITIONER

## 2023-10-23 PROCEDURE — 74176 CT ABD & PELVIS W/O CONTRAST: CPT | Performed by: RADIOLOGY

## 2023-10-23 PROCEDURE — 83605 ASSAY OF LACTIC ACID: CPT | Performed by: NURSE PRACTITIONER

## 2023-10-23 ASSESSMENT — COLUMBIA-SUICIDE SEVERITY RATING SCALE - C-SSRS
1. IN THE PAST MONTH, HAVE YOU WISHED YOU WERE DEAD OR WISHED YOU COULD GO TO SLEEP AND NOT WAKE UP?: NO
6. HAVE YOU EVER DONE ANYTHING, STARTED TO DO ANYTHING, OR PREPARED TO DO ANYTHING TO END YOUR LIFE?: NO
2. HAVE YOU ACTUALLY HAD ANY THOUGHTS OF KILLING YOURSELF?: NO

## 2023-10-23 NOTE — ED TRIAGE NOTES
Secondary to patient volumes and overcrowding, I performed a brief medical screening exam of the patient in triage, as the patient awaits space in the main ED.    History of Present Illness:  Ashley Valencia presents with dysuria, and fevers at home after having gender reassignment surgery 2 weeks ago.  Patient also has history of kidney stones and has 2 stents in place and is concerned that there is an infection present.  Physical Exam:  General - In no acute distress  Respiratory - Breathing comfortably  Neurologic - Moving all extremities  Abdomen -flat, no tenderness, bowel sounds present, no CVAT    Medical Decision Making:  Patient will require further evaluation in the main ED.    Initial diagnostic tests were ordered from triage.    The patient demonstrates understanding that this initial evaluation is a brief medical screening exam and the expectation is that they await for space in the main ED to be further evaluated.  The patient understands that, if they leave prior to further evaluation in the main ED after this initial evaluation in triage, they are doing so under their own accord knowing that their evaluation/work-up is not yet complete. The patient also understands that any preliminary diagnostic results, including abnormalities, may not be shared with them, if they choose to leave prior to further evaluation in the main ED.

## 2023-10-24 ENCOUNTER — HOSPITAL ENCOUNTER (OUTPATIENT)
Facility: HOSPITAL | Age: 58
Setting detail: OBSERVATION
Discharge: HOME | End: 2023-10-24
Attending: EMERGENCY MEDICINE | Admitting: NURSE PRACTITIONER
Payer: COMMERCIAL

## 2023-10-24 VITALS
HEART RATE: 115 BPM | BODY MASS INDEX: 24.38 KG/M2 | WEIGHT: 180 LBS | HEIGHT: 72 IN | OXYGEN SATURATION: 99 % | SYSTOLIC BLOOD PRESSURE: 134 MMHG | RESPIRATION RATE: 18 BRPM | TEMPERATURE: 99.5 F | DIASTOLIC BLOOD PRESSURE: 88 MMHG

## 2023-10-24 DIAGNOSIS — F64.9 GENDER IDENTITY DISORDER, UNSPECIFIED: ICD-10-CM

## 2023-10-24 DIAGNOSIS — N12 PYELONEPHRITIS: Primary | ICD-10-CM

## 2023-10-24 DIAGNOSIS — G89.18 ACUTE POST-OPERATIVE PAIN: ICD-10-CM

## 2023-10-24 DIAGNOSIS — Z87.890 STATUS POST GENDER REASSIGNMENT SURGERY: ICD-10-CM

## 2023-10-24 DIAGNOSIS — N10 ACUTE PYELONEPHRITIS: ICD-10-CM

## 2023-10-24 PROBLEM — N39.0 UTI (URINARY TRACT INFECTION): Status: ACTIVE | Noted: 2023-10-24

## 2023-10-24 LAB
ANION GAP SERPL CALC-SCNC: 11 MMOL/L (ref 10–20)
BUN SERPL-MCNC: 17 MG/DL (ref 6–23)
CALCIUM SERPL-MCNC: 8.2 MG/DL (ref 8.6–10.3)
CHLORIDE SERPL-SCNC: 108 MMOL/L (ref 98–107)
CO2 SERPL-SCNC: 21 MMOL/L (ref 21–32)
CREAT SERPL-MCNC: 1 MG/DL (ref 0.5–1.3)
ERYTHROCYTE [DISTWIDTH] IN BLOOD BY AUTOMATED COUNT: 13.5 % (ref 11.5–14.5)
GFR SERPL CREATININE-BSD FRML MDRD: 88 ML/MIN/1.73M*2
GLUCOSE SERPL-MCNC: 103 MG/DL (ref 74–99)
HCT VFR BLD AUTO: 34.2 % (ref 36–52)
HGB BLD-MCNC: 11.2 G/DL (ref 12–17.5)
MCH RBC QN AUTO: 29.1 PG (ref 26–34)
MCHC RBC AUTO-ENTMCNC: 32.7 G/DL (ref 32–36)
MCV RBC AUTO: 89 FL (ref 80–100)
NRBC BLD-RTO: 0 /100 WBCS (ref 0–0)
PLATELET # BLD AUTO: 263 X10*3/UL (ref 150–450)
PMV BLD AUTO: 9.6 FL (ref 7.5–11.5)
POTASSIUM SERPL-SCNC: 3.7 MMOL/L (ref 3.5–5.3)
RBC # BLD AUTO: 3.85 X10*6/UL (ref 4–5.9)
SODIUM SERPL-SCNC: 136 MMOL/L (ref 136–145)
WBC # BLD AUTO: 8.7 X10*3/UL (ref 4.4–11.3)

## 2023-10-24 PROCEDURE — 2500000004 HC RX 250 GENERAL PHARMACY W/ HCPCS (ALT 636 FOR OP/ED): Performed by: INTERNAL MEDICINE

## 2023-10-24 PROCEDURE — 2500000004 HC RX 250 GENERAL PHARMACY W/ HCPCS (ALT 636 FOR OP/ED): Performed by: PHYSICIAN ASSISTANT

## 2023-10-24 PROCEDURE — 99223 1ST HOSP IP/OBS HIGH 75: CPT | Performed by: NURSE PRACTITIONER

## 2023-10-24 PROCEDURE — 96365 THER/PROPH/DIAG IV INF INIT: CPT

## 2023-10-24 PROCEDURE — 87075 CULTR BACTERIA EXCEPT BLOOD: CPT | Mod: CMCLAB,PARLAB,59 | Performed by: PHYSICIAN ASSISTANT

## 2023-10-24 PROCEDURE — 85027 COMPLETE CBC AUTOMATED: CPT | Performed by: INTERNAL MEDICINE

## 2023-10-24 PROCEDURE — G0378 HOSPITAL OBSERVATION PER HR: HCPCS

## 2023-10-24 PROCEDURE — 87040 BLOOD CULTURE FOR BACTERIA: CPT | Mod: CMCLAB,PARLAB | Performed by: PHYSICIAN ASSISTANT

## 2023-10-24 PROCEDURE — 80048 BASIC METABOLIC PNL TOTAL CA: CPT | Performed by: INTERNAL MEDICINE

## 2023-10-24 PROCEDURE — 36415 COLL VENOUS BLD VENIPUNCTURE: CPT | Performed by: INTERNAL MEDICINE

## 2023-10-24 PROCEDURE — 96366 THER/PROPH/DIAG IV INF ADDON: CPT

## 2023-10-24 PROCEDURE — 36415 COLL VENOUS BLD VENIPUNCTURE: CPT | Performed by: PHYSICIAN ASSISTANT

## 2023-10-24 PROCEDURE — 96361 HYDRATE IV INFUSION ADD-ON: CPT

## 2023-10-24 RX ORDER — CEFTRIAXONE 2 G/50ML
2 INJECTION, SOLUTION INTRAVENOUS ONCE
Status: COMPLETED | OUTPATIENT
Start: 2023-10-24 | End: 2023-10-24

## 2023-10-24 RX ORDER — ONDANSETRON 4 MG/1
4 TABLET, ORALLY DISINTEGRATING ORAL EVERY 8 HOURS PRN
Qty: 30 TABLET | Refills: 0 | Status: SHIPPED | OUTPATIENT
Start: 2023-10-24 | End: 2023-12-01 | Stop reason: ALTCHOICE

## 2023-10-24 RX ORDER — HEPARIN SODIUM 5000 [USP'U]/ML
5000 INJECTION, SOLUTION INTRAVENOUS; SUBCUTANEOUS EVERY 8 HOURS SCHEDULED
Status: CANCELLED | OUTPATIENT
Start: 2023-10-24

## 2023-10-24 RX ORDER — SODIUM CHLORIDE 9 MG/ML
100 INJECTION, SOLUTION INTRAVENOUS CONTINUOUS
Status: DISCONTINUED | OUTPATIENT
Start: 2023-10-24 | End: 2023-10-24 | Stop reason: HOSPADM

## 2023-10-24 RX ORDER — CEFTRIAXONE 2 G/50ML
2 INJECTION, SOLUTION INTRAVENOUS EVERY 24 HOURS
Status: CANCELLED | OUTPATIENT
Start: 2023-10-25

## 2023-10-24 RX ORDER — ACETAMINOPHEN 325 MG/1
TABLET ORAL
Status: DISCONTINUED
Start: 2023-10-24 | End: 2023-10-24 | Stop reason: HOSPADM

## 2023-10-24 RX ORDER — ACETAMINOPHEN 325 MG/1
650 TABLET ORAL EVERY 4 HOURS PRN
Status: CANCELLED | OUTPATIENT
Start: 2023-10-24

## 2023-10-24 RX ORDER — ACETAMINOPHEN 325 MG/1
975 TABLET ORAL ONCE
Status: COMPLETED | OUTPATIENT
Start: 2023-10-24 | End: 2023-10-24

## 2023-10-24 RX ORDER — LEVOFLOXACIN 500 MG/1
500 TABLET, FILM COATED ORAL DAILY
Qty: 14 TABLET | Refills: 0 | Status: SHIPPED | OUTPATIENT
Start: 2023-10-24 | End: 2023-12-01 | Stop reason: ALTCHOICE

## 2023-10-24 RX ORDER — IBUPROFEN 600 MG/1
600 TABLET ORAL EVERY 6 HOURS SCHEDULED
Qty: 28 TABLET | Refills: 0 | Status: SHIPPED | OUTPATIENT
Start: 2023-10-24 | End: 2023-11-06 | Stop reason: ALTCHOICE

## 2023-10-24 RX ADMIN — ACETAMINOPHEN 975 MG: 325 TABLET ORAL at 01:40

## 2023-10-24 RX ADMIN — SODIUM CHLORIDE 1000 ML: 9 INJECTION, SOLUTION INTRAVENOUS at 01:52

## 2023-10-24 RX ADMIN — SODIUM CHLORIDE 100 ML/HR: 9 INJECTION, SOLUTION INTRAVENOUS at 08:08

## 2023-10-24 RX ADMIN — CEFTRIAXONE 2 G: 2 INJECTION, SOLUTION INTRAVENOUS at 01:40

## 2023-10-24 SDOH — SOCIAL STABILITY: SOCIAL INSECURITY: ARE THERE ANY APPARENT SIGNS OF INJURIES/BEHAVIORS THAT COULD BE RELATED TO ABUSE/NEGLECT?: NO

## 2023-10-24 SDOH — SOCIAL STABILITY: SOCIAL INSECURITY: DOES ANYONE TRY TO KEEP YOU FROM HAVING/CONTACTING OTHER FRIENDS OR DOING THINGS OUTSIDE YOUR HOME?: NO

## 2023-10-24 SDOH — SOCIAL STABILITY: SOCIAL INSECURITY: DO YOU FEEL UNSAFE GOING BACK TO THE PLACE WHERE YOU ARE LIVING?: NO

## 2023-10-24 SDOH — SOCIAL STABILITY: SOCIAL INSECURITY: HAS ANYONE EVER THREATENED TO HURT YOUR FAMILY OR YOUR PETS?: NO

## 2023-10-24 SDOH — SOCIAL STABILITY: SOCIAL INSECURITY: ABUSE: ADULT

## 2023-10-24 SDOH — SOCIAL STABILITY: SOCIAL INSECURITY: ARE YOU OR HAVE YOU BEEN THREATENED OR ABUSED PHYSICALLY, EMOTIONALLY, OR SEXUALLY BY ANYONE?: NO

## 2023-10-24 SDOH — SOCIAL STABILITY: SOCIAL INSECURITY: DO YOU FEEL ANYONE HAS EXPLOITED OR TAKEN ADVANTAGE OF YOU FINANCIALLY OR OF YOUR PERSONAL PROPERTY?: NO

## 2023-10-24 SDOH — SOCIAL STABILITY: SOCIAL INSECURITY: WERE YOU ABLE TO COMPLETE ALL THE BEHAVIORAL HEALTH SCREENINGS?: YES

## 2023-10-24 SDOH — SOCIAL STABILITY: SOCIAL INSECURITY: HAVE YOU HAD THOUGHTS OF HARMING ANYONE ELSE?: NO

## 2023-10-24 ASSESSMENT — PATIENT HEALTH QUESTIONNAIRE - PHQ9
2. FEELING DOWN, DEPRESSED OR HOPELESS: NOT AT ALL
1. LITTLE INTEREST OR PLEASURE IN DOING THINGS: NOT AT ALL
SUM OF ALL RESPONSES TO PHQ9 QUESTIONS 1 & 2: 0

## 2023-10-24 ASSESSMENT — ACTIVITIES OF DAILY LIVING (ADL)
HEARING - RIGHT EAR: FUNCTIONAL
WALKS IN HOME: NEEDS ASSISTANCE
PATIENT'S MEMORY ADEQUATE TO SAFELY COMPLETE DAILY ACTIVITIES?: YES
DRESSING YOURSELF: INDEPENDENT
HEARING - LEFT EAR: FUNCTIONAL
TOILETING: INDEPENDENT
FEEDING YOURSELF: INDEPENDENT
ADEQUATE_TO_COMPLETE_ADL: YES
JUDGMENT_ADEQUATE_SAFELY_COMPLETE_DAILY_ACTIVITIES: YES
BATHING: INDEPENDENT
GROOMING: INDEPENDENT

## 2023-10-24 ASSESSMENT — COLUMBIA-SUICIDE SEVERITY RATING SCALE - C-SSRS
2. HAVE YOU ACTUALLY HAD ANY THOUGHTS OF KILLING YOURSELF?: NO
1. IN THE PAST MONTH, HAVE YOU WISHED YOU WERE DEAD OR WISHED YOU COULD GO TO SLEEP AND NOT WAKE UP?: NO
6. HAVE YOU EVER DONE ANYTHING, STARTED TO DO ANYTHING, OR PREPARED TO DO ANYTHING TO END YOUR LIFE?: NO

## 2023-10-24 ASSESSMENT — COGNITIVE AND FUNCTIONAL STATUS - GENERAL
MOVING TO AND FROM BED TO CHAIR: A LITTLE
CLIMB 3 TO 5 STEPS WITH RAILING: A LITTLE
WALKING IN HOSPITAL ROOM: A LITTLE
TURNING FROM BACK TO SIDE WHILE IN FLAT BAD: A LITTLE
MOBILITY SCORE: 20
DAILY ACTIVITIY SCORE: 24
PATIENT BASELINE BEDBOUND: NO

## 2023-10-24 ASSESSMENT — LIFESTYLE VARIABLES
SUBSTANCE_ABUSE_PAST_12_MONTHS: NO
HAVE YOU EVER FELT YOU SHOULD CUT DOWN ON YOUR DRINKING: NO
HOW OFTEN DO YOU HAVE A DRINK CONTAINING ALCOHOL: NEVER
HAVE PEOPLE ANNOYED YOU BY CRITICIZING YOUR DRINKING: NO
PRESCIPTION_ABUSE_PAST_12_MONTHS: NO
HOW MANY STANDARD DRINKS CONTAINING ALCOHOL DO YOU HAVE ON A TYPICAL DAY: PATIENT DOES NOT DRINK
EVER HAD A DRINK FIRST THING IN THE MORNING TO STEADY YOUR NERVES TO GET RID OF A HANGOVER: NO
EVER FELT BAD OR GUILTY ABOUT YOUR DRINKING: NO
REASON UNABLE TO ASSESS: NO

## 2023-10-24 ASSESSMENT — PAIN SCALES - GENERAL
PAINLEVEL_OUTOF10: 6
PAINLEVEL_OUTOF10: 5 - MODERATE PAIN

## 2023-10-24 NOTE — ED PROVIDER NOTES
HPI   Chief Complaint   Patient presents with    Back Pain     Patient had recent gender surgery. C/o pain to low back, and cloudy urine,       58-year-old transgendered male to female presents complaining of UTI-like symptoms and back pain.  Patient has a remote history of fibromyalgia depression gender dysphoria hypertension hyperlipidemia and kidney stones.  Patient reports symptoms for the past few days which consist of dysuria and cloudy appearance to her urine and fever/chills.  No reports of vomiting.  Patient does report discomfort about her lower back which is not reproducible.  No reports of obvious hematuria.  Patient states that she had vaginoplasty by Dr. Lock on the third of this month.  She states that she did have a Sun catheter which was removed on the ninth.       used: No                        No data recorded                Patient History   Past Medical History:   Diagnosis Date    Calculus of kidney 01/28/2019    Kidney stone on left side    Depression     Fibromyalgia     Gender dysphoria     HLD (hyperlipidemia)     HTN (hypertension)     IBS (irritable bowel syndrome)     Migraines     Personal history of colonic polyps 05/07/2021    Unspecified hydronephrosis 10/08/2020    Hydronephrosis, left     Past Surgical History:   Procedure Laterality Date    HEMORRHOID SURGERY  12/11/2018    Hemorrhoidectomy    OTHER SURGICAL HISTORY  12/11/2018    Colonoscopy     Family History   Problem Relation Name Age of Onset    Breast cancer Mother      Dementia Mother      Hypertension Mother      Carpal tunnel syndrome Mother      Osteoarthritis Mother      Diabetes Other      Arthritis Other      Cancer Other      Hypertension Other      Alcohol abuse Other       Social History     Tobacco Use    Smoking status: Never    Smokeless tobacco: Not on file   Substance Use Topics    Alcohol use: Not Currently    Drug use: Never       Physical Exam   ED Triage Vitals [10/23/23 1808]    Temp Heart Rate Resp BP   37.6 °C (99.7 °F) 108 20 144/85      SpO2 Temp Source Heart Rate Source Patient Position   96 % Skin Monitor Sitting      BP Location FiO2 (%)     Right arm --       Physical Exam  Vitals and nursing note reviewed.   Constitutional:       General: She is not in acute distress.     Appearance: Normal appearance. She is normal weight. She is not ill-appearing, toxic-appearing or diaphoretic.   HENT:      Head: Normocephalic.      Nose: Nose normal.      Mouth/Throat:      Mouth: Mucous membranes are moist.   Eyes:      Extraocular Movements: Extraocular movements intact.      Conjunctiva/sclera: Conjunctivae normal.   Cardiovascular:      Rate and Rhythm: Regular rhythm. Tachycardia present.      Pulses: Normal pulses.   Pulmonary:      Effort: Pulmonary effort is normal. No respiratory distress.      Breath sounds: Normal breath sounds.   Abdominal:      General: Abdomen is flat. Bowel sounds are normal. There is no distension.      Palpations: Abdomen is soft.      Tenderness: There is right CVA tenderness and left CVA tenderness. There is no guarding or rebound.   Musculoskeletal:         General: Normal range of motion.      Cervical back: Normal range of motion and neck supple.   Skin:     General: Skin is warm and dry.      Capillary Refill: Capillary refill takes less than 2 seconds.   Neurological:      General: No focal deficit present.      Mental Status: She is alert and oriented to person, place, and time.         ED Course & [facility]   ED Course as of 10/24/23 0147   Tue Oct 24, 2023   0126 CT IMPRESSION:  1.  Interval placement of a bilateral ureteral stents with similar  appearing bilateral nephrolithiasis burden. There is asymmetric  left-sided perinephric stranding. Clinically correlate for exclusion  of ascending infection or pyelonephritis. Evaluation for  pyelonephritis is significantly limited on the current noncontrast  examination.  2. Interval postsurgical changes along  the anterior lower pelvic  region with region of confluent edema or fluid measuring up to 4.8 x  2.2 x 6.1 cm. Findings may represent expected postoperative changes  or hematoma from recent surgery, however clinically correlate for  exclusion of developing soft tissue infection in this region.  Correlate with operative history.  3. Remaining findings appear similar to comparison CT imaging  09/14/2023.   [DS]   0126 WBC: 9.1 [DS]   0126 Lactate: 1.3 [DS]      ED Course User Index  [DS] Donald Presley PA-C       Medical Decision Making  Patient was seen and evaluated for UTI-like symptoms and lower back discomfort.  I reviewed the patient's electronic medical records along with with her most recent encounters and procedures.  Patient had a Sun catheter for a vaginoplasty which was performed by Dr. Lock at the beginning of this month.  Patient endorsed over the past week she has had a cloudy appearance to her urine as well as dysuria.  Patient was found to have bilateral CVA tenderness on exam.  Triage provider ordered blood work along with CT noncontrast imaging.  The patient was found to have no evidence of leukocytosis or concerning anemia.  CT imaging revealed findings concerning for pyelonephritis.  Additional findings associated with the recent vaginoplasty was noted.  Patient was initially found to be tachycardic and borderline febrile.  Given this along with the findings of pyelonephritis and reports of subjective fevers and chills I recommended admission for further evaluation.  Patient was provided with Tylenol along with IV hydration.  2 sets of blood cultures were obtained patient was placed on ceftriaxone.        Procedure  Procedures     Donald Presley PA-C  10/24/23 0207

## 2023-10-24 NOTE — PROGRESS NOTES
Ashley Valencia is a 58 y.o. adult on day 0 of admission presenting with UTI (urinary tract infection).      Subjective   Pt with PMHx including HTN, HLD, IBS, Kidney Stone with Hydronephrosis and Stent Placement, CKD, and Gender Dysphoria with vaginoplasty presented to [facility] ED with Dysuria, Cloudy Urine, and Back Pain. + Mild Chills and Nausea.       Objective     Last Recorded Vitals  /88 (BP Location: Right arm)   Pulse (!) 115   Temp 37.5 °C (99.5 °F) (Temporal)   Resp 18   Wt 81.6 kg (180 lb)   SpO2 99%   Intake/Output last 3 Shifts:    Intake/Output Summary (Last 24 hours) at 10/24/2023 1538  Last data filed at 10/24/2023 0313  Gross per 24 hour   Intake 1050 ml   Output --   Net 1050 ml       Admission Weight  Weight: 81.6 kg (180 lb) (10/23/23 1808)    Daily Weight  10/23/23 : 81.6 kg (180 lb)    Image Results  CT abdomen pelvis wo IV contrast  Narrative: Interpreted By:  Hiram French,   STUDY:  CT ABDOMEN PELVIS WO IV CONTRAST;  10/23/2023 6:33 pm      INDICATION:  Signs/Symptoms:Previous kidney stones, concern for infection..      COMPARISON:  Chest CT 09/14/2023      ACCESSION NUMBER(S):  [id number]      ORDERING CLINICIAN:  IDA GARCIA      TECHNIQUE:  CT of the abdomen and pelvis was performed. Contiguous axial images  were obtained at 3 mm slice thickness through the abdomen and pelvis.  Coronal and sagittal reconstructions at 3 mm slice thickness were  performed.  No intravenous contrast was administered.      FINDINGS:  Please note that the evaluation of vessels, lymph nodes and organs is  limited without intravenous contrast.      LOWER CHEST:  No new consolidation or effusion.      ABDOMEN:      LIVER:  Unchanged. Scattered subcentimeter hepatic hypodensities, too small  to characterize, however statistically likely representing cysts.      BILE DUCTS:  No significant biliary dilatation.      GALLBLADDER:  Contracted. No obvious calcified gallstones.       PANCREAS:  Unremarkable.      SPLEEN:  Unremarkable      ADRENAL GLANDS:  Unremarkable      KIDNEYS AND URETERS:  Interval placement of bilateral ureteral stents. There is mild  asymmetric left-sided perinephric stranding, nonspecific. Similar  appearing bilateral nephrolithiasis burden. Presence of ureteral  stents limits evaluation for superimposed calculus within the ureters.      PELVIS:      BLADDER:  Underdistended.      REPRODUCTIVE ORGANS:  There appear to be postsurgical changes along the lower anterior  pelvic region with a postoperative collection measuring up to 4.8 x  2.2 x 6.1 cm (series 601 image 163, series 603, image 67). There are  postoperative changes of presumed sexual reassignment surgery.      BOWEL:  Small sliding-type hiatal hernia. No pathologic distention of large  or small bowel.      VESSELS:  No evidence of aortic aneurysm.      PERITONEUM/RETROPERITONEUM/LYMPH NODES:  No ascites or free air, no fluid collection.  No enlarged mesenteric  lymph nodes.      ABDOMINAL WALL:  See above for discussion of postsurgical changes within the lower  anterior pelvic region.      BONES:  No acute osseous abnormality. Multilevel spondylosis throughout the  imaged spine.      Impression: 1.  Interval placement of a bilateral ureteral stents with similar  appearing bilateral nephrolithiasis burden. There is asymmetric  left-sided perinephric stranding. Clinically correlate for exclusion  of ascending infection or pyelonephritis. Evaluation for  pyelonephritis is significantly limited on the current noncontrast  examination.  2. Interval postsurgical changes along the anterior lower pelvic  region with region of confluent edema or fluid measuring up to 4.8 x  2.2 x 6.1 cm. Findings may represent expected postoperative changes  or hematoma from recent surgery, however clinically correlate for  exclusion of developing soft tissue infection in this region.  Correlate with operative history.  3. Remaining  findings appear similar to comparison CT imaging  09/14/2023.          Signed by: Hiram French 10/23/2023 7:14 PM  Dictation workstation:   [id number]      Physical Exam  Gen - NAD  ENT - NC  Neck - No JVD  H - S1S2  L - Decreased BS  Abd - Soft  Ext - W & D  Neuro - Awake and Responsive  Relevant Results    Scheduled medications  acetaminophen, , ,       Continuous medications  sodium chloride 0.9%, 100 mL/hr, Last Rate: 100 mL/hr (10/24/23 0808)      PRN medications  PRN medications: acetaminophen  Results for orders placed or performed during the hospital encounter of 10/24/23 (from the past 96 hour(s))   CBC and Auto Differential   Result Value Ref Range    WBC 9.1 4.4 - 11.3 x10*3/uL    nRBC 0.0 0.0 - 0.0 /100 WBCs    RBC 4.18 4.00 - 5.90 x10*6/uL    Hemoglobin 12.3 12.0 - 17.5 g/dL    Hematocrit 38.0 36.0 - 52.0 %    MCV 91 80 - 100 fL    MCH 29.4 26.0 - 34.0 pg    MCHC 32.4 32.0 - 36.0 g/dL    RDW 13.5 11.5 - 14.5 %    Platelets 351 150 - 450 x10*3/uL    MPV 10.3 7.5 - 11.5 fL    Neutrophils % 76.2 40.0 - 80.0 %    Immature Granulocytes %, Automated 0.6 0.0 - 0.9 %    Lymphocytes % 9.8 13.0 - 44.0 %    Monocytes % 11.0 2.0 - 10.0 %    Eosinophils % 2.1 0.0 - 6.0 %    Basophils % 0.3 0.0 - 2.0 %    Neutrophils Absolute 6.93 1.20 - 7.70 x10*3/uL    Immature Granulocytes Absolute, Automated 0.05 0.00 - 0.70 x10*3/uL    Lymphocytes Absolute 0.89 (L) 1.20 - 4.80 x10*3/uL    Monocytes Absolute 1.00 0.10 - 1.00 x10*3/uL    Eosinophils Absolute 0.19 0.00 - 0.70 x10*3/uL    Basophils Absolute 0.03 0.00 - 0.10 x10*3/uL   Comprehensive metabolic panel   Result Value Ref Range    Glucose 95 74 - 99 mg/dL    Sodium 135 (L) 136 - 145 mmol/L    Potassium 3.7 3.5 - 5.3 mmol/L    Chloride 104 98 - 107 mmol/L    Bicarbonate 25 21 - 32 mmol/L    Anion Gap 10 10 - 20 mmol/L    Urea Nitrogen 29 (H) 6 - 23 mg/dL    Creatinine 1.18 0.50 - 1.30 mg/dL    eGFR 72 >60 mL/min/1.73m*2    Calcium 9.1 8.6 - 10.3 mg/dL    Albumin 4.0 3.4  - 5.0 g/dL    Alkaline Phosphatase 58 33 - 120 U/L    Total Protein 6.8 6.4 - 8.2 g/dL    AST 15 9 - 39 U/L    Bilirubin, Total 0.4 0.0 - 1.2 mg/dL    ALT 11 7 - 52 U/L   Lactate   Result Value Ref Range    Lactate 1.3 0.4 - 2.0 mmol/L   Urinalysis with Reflex Microscopic and Culture   Result Value Ref Range    Color, Urine Orange (N) Straw, Yellow    Appearance, Urine Turbid (N) Clear    Specific Gravity, Urine 1.020 1.005 - 1.035    pH, Urine 6.0 5.0, 5.5, 6.0, 6.5, 7.0, 7.5, 8.0    Protein, Urine 100 (2+) (A) NEGATIVE, 10 (TRACE) mg/dL    Glucose, Urine NEGATIVE NEGATIVE mg/dL    Blood, Urine 1.0 (3+) (A) NEGATIVE    Ketones, Urine 5 (TRACE) (A) NEGATIVE mg/dL    Bilirubin, Urine NEGATIVE NEGATIVE    Urobilinogen, Urine NORM NORM mg/dL    Nitrite, Urine NEGATIVE NEGATIVE    Leukocyte Esterase, Urine 500 Isbaella/µL (A) NEGATIVE   Microscopic Only, Urine   Result Value Ref Range    WBC, Urine >50 (A) 1-5, NONE /HPF    RBC, Urine >20 (A) NONE, 1-2, 3-5 /HPF    Bacteria, Urine 4+ (A) NONE SEEN /HPF   Blood Culture    Specimen: Peripheral Venipuncture; Blood culture   Result Value Ref Range    Blood Culture Loaded on Instrument - Culture in progress    Blood Culture    Specimen: Peripheral Venipuncture; Blood culture   Result Value Ref Range    Blood Culture Loaded on Instrument - Culture in progress    CBC   Result Value Ref Range    WBC 8.7 4.4 - 11.3 x10*3/uL    nRBC 0.0 0.0 - 0.0 /100 WBCs    RBC 3.85 (L) 4.00 - 5.90 x10*6/uL    Hemoglobin 11.2 (L) 12.0 - 17.5 g/dL    Hematocrit 34.2 (L) 36.0 - 52.0 %    MCV 89 80 - 100 fL    MCH 29.1 26.0 - 34.0 pg    MCHC 32.7 32.0 - 36.0 g/dL    RDW 13.5 11.5 - 14.5 %    Platelets 263 150 - 450 x10*3/uL    MPV 9.6 7.5 - 11.5 fL   Basic metabolic panel   Result Value Ref Range    Glucose 103 (H) 74 - 99 mg/dL    Sodium 136 136 - 145 mmol/L    Potassium 3.7 3.5 - 5.3 mmol/L    Chloride 108 (H) 98 - 107 mmol/L    Bicarbonate 21 21 - 32 mmol/L    Anion Gap 11 10 - 20 mmol/L    Urea  Nitrogen 17 6 - 23 mg/dL    Creatinine 1.00 0.50 - 1.30 mg/dL    eGFR 88 >60 mL/min/1.73m*2    Calcium 8.2 (L) 8.6 - 10.3 mg/dL               Assessment/Plan      HTN  HLD  UTI/Pyelonephritis  Urology Appreciated  Discharge to Home with Levaquin and Zofran and Recommend Clear Liquid Diet.  Follow up with Own PCP in 1 - 2 Days and Own Urologist in 1 week.            Principal Problem:    UTI (urinary tract infection)                  Navjot Ellison DO

## 2023-10-24 NOTE — PROGRESS NOTES
"Pharmacy Medication History Review    Ashley Valencia is a 58 y.o. adult admitted for UTI (urinary tract infection). Pharmacy reviewed the patient's [id number] medications and allergies for accuracy.    The list below reflectives the updated PTA list. Please review each medication in order reconciliation for additional clarification and justification.  Prior to Admission Medications   Prescriptions Last Dose Informant Patient Reported? Taking?   BACILLUS COAGULANS-INULIN ORAL 10/23/2023 at am Self Yes Yes   Sig: Take 1 capsule by mouth once daily with a meal.   BD Luer-Orestes Syringe 1 mL syringe Unknown  Yes No   BD Regular Bevel Needles 18 gauge x 1 1/2\" needle Unknown  Yes No   BD SafetyGlide Syringe 3 mL 23 x 1\" syringe Unknown  Yes No   MAGNESIUM MAL, THREON, CHELATE ORAL 10/22/2023 at pm  Yes Yes   Sig: Take 144 mg by mouth once daily in the evening. Take with meals.   MULTIVITAMIN ORAL 10/23/2023 at am Self Yes Yes   Sig: Take 1 tablet by mouth 2 times a day.   Nurtec ODT 75 mg tablet,disintegrating Past Month  Yes Yes   Sig: Take 1 tablet (75 mg) by mouth once daily as needed.   Surgilube gel Unknown  Yes No   Sig: USE ONE GRAM THREE TIMES A DAY FOR VAGINAL DILATION FOR THREE MONTHS  THEN AS INSTRUCTED.   acetaminophen (Tylenol) 325 mg tablet 10/23/2023 at 1pm  No Yes   Sig: Take 3 tablets (975 mg) by mouth every 6 hours for 14 days.   acetaminophen (Tylenol) 500 mg tablet   Yes No   Sig: Take 1 tablet (500 mg) by mouth.   alcohol swabs pads, medicated Unknown  Yes No   allopurinol (Zyloprim) 100 mg tablet 10/22/2023 at pm Self Yes Yes   Sig: Take 1 tablet (100 mg) by mouth once daily in the evening. Take with meals.   amLODIPine (Norvasc) 5 mg tablet 10/22/2023 at pm Self Yes Yes   Sig: Take 1 tablet (5 mg) by mouth once daily in the evening. Take with meals.   amitriptyline (Elavil) 25 mg tablet 10/22/2023 at pm  Yes Yes   Sig: Take 3 tablets (75 mg) by mouth once daily at bedtime.   ascorbic " acid/collagen hydr (COLLAGEN SKIN RENEWAL ORAL) 10/23/2023 at am Self Yes Yes   Sig: Take 1 Capful by mouth once daily with a meal.   aspirin 81 mg EC tablet 10/22/2023 at hs  Yes Yes   Sig: Take 1 tablet (81 mg) by mouth once daily at bedtime.   atorvastatin (Lipitor) 10 mg tablet 10/22/2023 at pm  Yes Yes   Sig: Take 1 tablet (10 mg) by mouth once daily in the evening. Take with meals.   bacitracin-polymyxin B (Polysporin) ophthalmic ointment   Yes no   Sig: Apply to incisions twice daily   bicalutamide (Casodex) 50 mg tablet   Yes No   Sig: Take 0.5 tablets (25 mg total) by mouth once daily.   cholecalciferol (Vitamin D-3) 50 mcg (2,000 unit) capsule 10/23/2023 at am  Yes Yes   Sig: Take 1 capsule (50 mcg) by mouth once daily.   diclofenac sodium (Voltaren) 1 % gel gel   Yes No   Sig: Apply 0.5 Applications topically 4 times a day as needed.   estradiol valerate (Delestrogen) 20 mg/mL injection 10/19/2023 Self Yes Yes   Sig: Inject 1 mL (20 mg) into the muscle 1 (one) time per week. On Thursday @ 8pm   ibuprofen 600 mg tablet   No No   Sig: Take 1 tablet (600 mg) by mouth every 6 hours for 14 days.   lidocaine-prilocaine (Emla) 2.5-2.5 % cream No longer using  Yes No   Sig: Apply topically.   melatonin 10 mg tablet 10/22/2023  Yes Yes   Sig: Take 1 tablet (10 mg) by mouth as needed at bedtime.   methocarbamol (Robaxin) 500 mg tablet No Longer Taking  No No   Sig: Take 1 tablet (500 mg) by mouth every 6 hours for 4 days.   metoprolol succinate XL (Toprol-XL) 100 mg 24 hr tablet No longer taking  Yes No   Sig: Take 1 tablet (100 mg) by mouth once daily.   polyethylene glycol (Glycolax, Miralax) 17 gram packet 10/23/2023 at am  No Yes   Sig: Take 17 g by mouth once daily. Do not start before October 7, 2023.   potassium citrate CR (Urocit-K-10) 10 mEq ER tablet 10/23/2023 at am  Yes Yes   Sig: Take 1 tablet (10 mEq) by mouth 2 times a day.   progesterone (Prometrium) 100 mg capsule Dose adjustment  Yes No   Sig:  Take 1 capsule (100 mg) by mouth.   progesterone (Prometrium) 200 mg capsule 10/22/2023 at ha  Yes Yes   Sig: Take 1 capsule (200 mg) by mouth once daily at bedtime.   propranolol LA (Inderal LA) 80 mg 24 hr capsule   Yes No   Sig: Take 1 capsule (80 mg) by mouth once daily.   sennosides-docusate sodium (Mary-Colace) 8.6-50 mg tablet 10/23/2023 at am  No Yes   Sig: Take 2 tablets by mouth 2 times a day.   Patient taking differently: Take 1 tablet by mouth 2 times a day.   sodium bicarbonate 650 mg tablet 10/23/2023 at am Self Yes Yes   Sig: Take 1 tablet (650 mg) by mouth 2 times a day. Before breakfast and at bedtime   tamsulosin (Flomax) 0.4 mg 24 hr capsule 10/22/2023 at pm  Yes Yes   Sig: Take 1 capsule (0.4 mg) by mouth once daily in the evening. Take with meals.   topiramate (Topamax) 25 mg tablet No longer taking  Yes No   Sig: Take 2 tablets (50 mg) by mouth once daily.   vitamin B complex (Vitamins B Complex) tablet 10/24/2023 at am  Yes Yes   Sig: Take 1 tablet by mouth 2 times a day with meals.      Facility-Administered Medications: None        The list below reflectives the updated allergy list. Please review each documented allergy for additional clarification and justification.  Allergies  Reviewed by Jojo Napier RN on 10/24/2023   No Known Allergies         Below are additional concerns with the patient's PTA list.      Anat Martin CPhT

## 2023-10-24 NOTE — H&P
History Of Present Illness  Ashley Valencia is a 58 y.o. adult presenting to emergency department for evaluation of a UTI and back pain.  Patient has a history of gender dysphoria and recently had a vaginoplasty.  Patient is a male that identifies as a female.  Patient does have a history of kidney stones and has ureter stents in place.  Patient also states that she did have a Sun catheter which was removed on the ninth of this month.  Patient reports symptoms for the last several days which consist of dysuria and cloudy urine with fever and chills.  Patient denies nausea or vomiting.  Patient also admits to discomfort in her lower back which is not reproducible.    In ED, urinalysis positive for infection, urine culture pending.  Imaging showing ureter stents in place with concern for pyelonephritis.  Patient started on IV ceftriaxone, medicated with Tylenol.  Blood pressure 124/79, heart rate 75, respirations 16, temperature 37.6 °C, SPO2 100% on room air.  Sodium 135, BUN 29, lactate normal at 1.3.  Patient given IV fluids.  Patient admitted to observation under the care of Dr. Ellison will continue to follow.  I was asked to do H&P and place initial admission orders.     Past Medical History  Left-sided kidney stone, left hydronephrosis, hypertension, hyperlipidemia, gender dysphoria, IBS, depression, CKD, GI bleed, migraines, gout, vaginal stenosis      Surgical History  Hemorrhoidectomy, colonoscopy, vaginoplasty       Social History  She reports that she has never smoked. She does not have any smokeless tobacco history on file. She reports that she does not currently use alcohol. She reports that she does not use drugs.    Family History  Alcohol abuse, breast cancer, dementia       Allergies  Patient has no known allergies.    Review of Systems  A 10 point review of systems was completed and negative except as listed in HPI  Physical Exam  Constitutional:       Appearance: Normal appearance. She is normal  weight.   HENT:      Head: Normocephalic.      Nose: Nose normal.      Mouth/Throat:      Mouth: Mucous membranes are moist.      Pharynx: Oropharynx is clear.   Eyes:      Pupils: Pupils are equal, round, and reactive to light.   Cardiovascular:      Rate and Rhythm: Normal rate and regular rhythm.   Pulmonary:      Effort: Pulmonary effort is normal.      Breath sounds: Normal breath sounds.   Abdominal:      General: Bowel sounds are normal.      Palpations: Abdomen is soft.   Musculoskeletal:         General: Normal range of motion.      Cervical back: Normal range of motion.      Lumbar back: Tenderness present.   Skin:     General: Skin is warm and dry.      Capillary Refill: Capillary refill takes less than 2 seconds.   Neurological:      General: No focal deficit present.      Mental Status: She is alert and oriented to person, place, and time.   Psychiatric:         Mood and Affect: Mood normal.         Behavior: Behavior normal.          Last Recorded Vitals  Blood pressure 124/79, pulse 75, temperature 37.6 °C (99.7 °F), temperature source Skin, resp. rate 16, height 1.829 m (6'), weight 81.6 kg (180 lb), SpO2 100 %.    Relevant Results  CT abdomen pelvis wo IV contrast    Result Date: 10/23/2023  Interpreted By:  Hiram French, STUDY: CT ABDOMEN PELVIS WO IV CONTRAST;  10/23/2023 6:33 pm   INDICATION: Signs/Symptoms:Previous kidney stones, concern for infection..   COMPARISON: Chest CT 09/14/2023   ACCESSION NUMBER(S): [id number]   ORDERING CLINICIAN: IDA GARCIA   TECHNIQUE: CT of the abdomen and pelvis was performed. Contiguous axial images were obtained at 3 mm slice thickness through the abdomen and pelvis. Coronal and sagittal reconstructions at 3 mm slice thickness were performed.  No intravenous contrast was administered.   FINDINGS: Please note that the evaluation of vessels, lymph nodes and organs is limited without intravenous contrast.   LOWER CHEST: No new consolidation or effusion.    ABDOMEN:   LIVER: Unchanged. Scattered subcentimeter hepatic hypodensities, too small to characterize, however statistically likely representing cysts.   BILE DUCTS: No significant biliary dilatation.   GALLBLADDER: Contracted. No obvious calcified gallstones.   PANCREAS: Unremarkable.   SPLEEN: Unremarkable   ADRENAL GLANDS: Unremarkable   KIDNEYS AND URETERS: Interval placement of bilateral ureteral stents. There is mild asymmetric left-sided perinephric stranding, nonspecific. Similar appearing bilateral nephrolithiasis burden. Presence of ureteral stents limits evaluation for superimposed calculus within the ureters.   PELVIS:   BLADDER: Underdistended.   REPRODUCTIVE ORGANS: There appear to be postsurgical changes along the lower anterior pelvic region with a postoperative collection measuring up to 4.8 x 2.2 x 6.1 cm (series 601 image 163, series 603, image 67). There are postoperative changes of presumed sexual reassignment surgery.   BOWEL: Small sliding-type hiatal hernia. No pathologic distention of large or small bowel.   VESSELS: No evidence of aortic aneurysm.   PERITONEUM/RETROPERITONEUM/LYMPH NODES: No ascites or free air, no fluid collection.  No enlarged mesenteric lymph nodes.   ABDOMINAL WALL: See above for discussion of postsurgical changes within the lower anterior pelvic region.   BONES: No acute osseous abnormality. Multilevel spondylosis throughout the imaged spine.       1.  Interval placement of a bilateral ureteral stents with similar appearing bilateral nephrolithiasis burden. There is asymmetric left-sided perinephric stranding. Clinically correlate for exclusion of ascending infection or pyelonephritis. Evaluation for pyelonephritis is significantly limited on the current noncontrast examination. 2. Interval postsurgical changes along the anterior lower pelvic region with region of confluent edema or fluid measuring up to 4.8 x 2.2 x 6.1 cm. Findings may represent expected postoperative  changes or hematoma from recent surgery, however clinically correlate for exclusion of developing soft tissue infection in this region. Correlate with operative history. 3. Remaining findings appear similar to comparison CT imaging 09/14/2023.     Signed by: Hiram French 10/23/2023 7:14 PM Dictation workstation:   [id number]     Results for orders placed or performed during the hospital encounter of 10/24/23 (from the past 24 hour(s))   CBC and Auto Differential   Result Value Ref Range    WBC 9.1 4.4 - 11.3 x10*3/uL    nRBC 0.0 0.0 - 0.0 /100 WBCs    RBC 4.18 4.00 - 5.90 x10*6/uL    Hemoglobin 12.3 12.0 - 17.5 g/dL    Hematocrit 38.0 36.0 - 52.0 %    MCV 91 80 - 100 fL    MCH 29.4 26.0 - 34.0 pg    MCHC 32.4 32.0 - 36.0 g/dL    RDW 13.5 11.5 - 14.5 %    Platelets 351 150 - 450 x10*3/uL    MPV 10.3 7.5 - 11.5 fL    Neutrophils % 76.2 40.0 - 80.0 %    Immature Granulocytes %, Automated 0.6 0.0 - 0.9 %    Lymphocytes % 9.8 13.0 - 44.0 %    Monocytes % 11.0 2.0 - 10.0 %    Eosinophils % 2.1 0.0 - 6.0 %    Basophils % 0.3 0.0 - 2.0 %    Neutrophils Absolute 6.93 1.20 - 7.70 x10*3/uL    Immature Granulocytes Absolute, Automated 0.05 0.00 - 0.70 x10*3/uL    Lymphocytes Absolute 0.89 (L) 1.20 - 4.80 x10*3/uL    Monocytes Absolute 1.00 0.10 - 1.00 x10*3/uL    Eosinophils Absolute 0.19 0.00 - 0.70 x10*3/uL    Basophils Absolute 0.03 0.00 - 0.10 x10*3/uL   Comprehensive metabolic panel   Result Value Ref Range    Glucose 95 74 - 99 mg/dL    Sodium 135 (L) 136 - 145 mmol/L    Potassium 3.7 3.5 - 5.3 mmol/L    Chloride 104 98 - 107 mmol/L    Bicarbonate 25 21 - 32 mmol/L    Anion Gap 10 10 - 20 mmol/L    Urea Nitrogen 29 (H) 6 - 23 mg/dL    Creatinine 1.18 0.50 - 1.30 mg/dL    eGFR 72 >60 mL/min/1.73m*2    Calcium 9.1 8.6 - 10.3 mg/dL    Albumin 4.0 3.4 - 5.0 g/dL    Alkaline Phosphatase 58 33 - 120 U/L    Total Protein 6.8 6.4 - 8.2 g/dL    AST 15 9 - 39 U/L    Bilirubin, Total 0.4 0.0 - 1.2 mg/dL    ALT 11 7 - 52 U/L    Lactate   Result Value Ref Range    Lactate 1.3 0.4 - 2.0 mmol/L   Urinalysis with Reflex Microscopic and Culture   Result Value Ref Range    Color, Urine Orange (N) Straw, Yellow    Appearance, Urine Turbid (N) Clear    Specific Gravity, Urine 1.020 1.005 - 1.035    pH, Urine 6.0 5.0, 5.5, 6.0, 6.5, 7.0, 7.5, 8.0    Protein, Urine 100 (2+) (A) NEGATIVE, 10 (TRACE) mg/dL    Glucose, Urine NEGATIVE NEGATIVE mg/dL    Blood, Urine 1.0 (3+) (A) NEGATIVE    Ketones, Urine 5 (TRACE) (A) NEGATIVE mg/dL    Bilirubin, Urine NEGATIVE NEGATIVE    Urobilinogen, Urine NORM NORM mg/dL    Nitrite, Urine NEGATIVE NEGATIVE    Leukocyte Esterase, Urine 500 Isabella/µL (A) NEGATIVE   Microscopic Only, Urine   Result Value Ref Range    WBC, Urine >50 (A) 1-5, NONE /HPF    RBC, Urine >20 (A) NONE, 1-2, 3-5 /HPF    Bacteria, Urine 4+ (A) NONE SEEN /HPF                Assessment/Plan   Ashley is a 58-year-old female patient who recently underwent vaginoplasty and had a Sun catheter removed on the ninth of this month.  Patient is reporting symptoms of a UTI for the last several days including dysuria, cloudy urine, fever, chills.  Patient has a history of kidney stones and has ureter stents in place.  Urinalysis positive for infection with urine culture pending.  Patient started on IV antibiotics and given IV fluids.  Admission for further medical management.    UTI/suspected pyelonephritis  Admit to Dr. Ellison  See imaging results above  Urine culture pending  Continue IV fluids  Continue IV antibiotics  Repeat labs    Hypertension/hyperlipidemia/depression/IBS/CKD/migraine/gout/vaginal stenosis  Continue home medications when med rec is complete  Cardiac diet    DVT Ppx  Heparin subcutaneous  Activity as tolerated           I spent 20 minutes in the professional and overall care of this patient.      Michelle Guerra, APRN-CNP

## 2023-10-26 LAB — BACTERIA UR CULT: ABNORMAL

## 2023-10-28 LAB
BACTERIA BLD CULT: NORMAL
BACTERIA BLD CULT: NORMAL

## 2023-10-31 ENCOUNTER — OFFICE VISIT (OUTPATIENT)
Dept: UROLOGY | Facility: CLINIC | Age: 58
End: 2023-10-31
Payer: COMMERCIAL

## 2023-10-31 VITALS
HEIGHT: 72 IN | DIASTOLIC BLOOD PRESSURE: 85 MMHG | SYSTOLIC BLOOD PRESSURE: 142 MMHG | WEIGHT: 181 LBS | HEART RATE: 87 BPM | TEMPERATURE: 97.3 F | BODY MASS INDEX: 24.52 KG/M2

## 2023-10-31 DIAGNOSIS — N20.0 NEPHROLITHIASIS: ICD-10-CM

## 2023-10-31 DIAGNOSIS — F64.9 GENDER DYSPHORIA: Primary | ICD-10-CM

## 2023-10-31 PROCEDURE — 3077F SYST BP >= 140 MM HG: CPT | Performed by: NURSE PRACTITIONER

## 2023-10-31 PROCEDURE — 3079F DIAST BP 80-89 MM HG: CPT | Performed by: NURSE PRACTITIONER

## 2023-10-31 PROCEDURE — 99024 POSTOP FOLLOW-UP VISIT: CPT | Performed by: NURSE PRACTITIONER

## 2023-10-31 RX ORDER — TAMSULOSIN HYDROCHLORIDE 0.4 MG/1
0.4 CAPSULE ORAL
Qty: 30 CAPSULE | Refills: 0 | Status: SHIPPED | OUTPATIENT
Start: 2023-10-31 | End: 2023-11-30

## 2023-10-31 NOTE — PROGRESS NOTES
"GENDER CARE POST-OP     HISTORY OF PRESENT ILLNESS:   Ashley Valencia is a 58 y.o. trans woman s/p PPT vaginoplasty 10/3/23.    INTERVAL HISTORY:  Returns today for POV  Seen unaccompanied  Kidney infection, seen in ED  On levofloxacin daily  Urine still occasionally cloudy, + dysuria  Otherwise no significant pain  Alternating between constipation and soft stool  Required a few doses of nausea meds  Excellent depth with orange through green dilators  Not using the green dilator consistently, doesn't always feel like it  No discharge, minimal bleeding  Using ice and scheduled NSAID for swelling  Concerned about \"bands\" of swelling over mons and vulva  Urine stream hitting perineum and buttocks  Asking about continuing meds for kidney stones    PAST MEDICAL HISTORY:  Past Medical History:   Diagnosis Date    Calculus of kidney 01/28/2019    Kidney stone on left side    Depression     Fibromyalgia     Gender dysphoria     HLD (hyperlipidemia)     HTN (hypertension)     IBS (irritable bowel syndrome)     Migraines     Personal history of colonic polyps 05/07/2021    Unspecified hydronephrosis 10/08/2020    Hydronephrosis, left       PAST SURGICAL HISTORY:  Past Surgical History:   Procedure Laterality Date    HEMORRHOID SURGERY  12/11/2018    Hemorrhoidectomy    OTHER SURGICAL HISTORY  12/11/2018    Colonoscopy        ALLERGIES:   No Known Allergies     MEDICATIONS:     Current Outpatient Medications:     acetaminophen (Tylenol) 500 mg tablet, Take 1 tablet (500 mg) by mouth., Disp: , Rfl:     alcohol swabs pads, medicated, , Disp: , Rfl:     allopurinol (Zyloprim) 100 mg tablet, Take 1 tablet (100 mg) by mouth once daily in the evening. Take with meals., Disp: , Rfl:     amitriptyline (Elavil) 25 mg tablet, Take 3 tablets (75 mg) by mouth once daily at bedtime., Disp: , Rfl:     amLODIPine (Norvasc) 5 mg tablet, Take 1 tablet (5 mg) by mouth once daily in the evening. Take with meals., Disp: , Rfl:     ascorbic " "acid/collagen hydr (COLLAGEN SKIN RENEWAL ORAL), Take 1 Capful by mouth once daily with a meal., Disp: , Rfl:     aspirin 81 mg EC tablet, Take 1 tablet (81 mg) by mouth once daily at bedtime., Disp: , Rfl:     atorvastatin (Lipitor) 10 mg tablet, Take 1 tablet (10 mg) by mouth once daily in the evening. Take with meals., Disp: , Rfl:     BACILLUS COAGULANS-INULIN ORAL, Take 1 capsule by mouth once daily with a meal., Disp: , Rfl:     bacitracin-polymyxin B (Polysporin) ophthalmic ointment, Apply to incisions twice daily, Disp: , Rfl:     BD Luer-Orestes Syringe 1 mL syringe, , Disp: , Rfl:     BD Regular Bevel Needles 18 gauge x 1 1/2\" needle, , Disp: , Rfl:     BD SafetyGlide Syringe 3 mL 23 x 1\" syringe, , Disp: , Rfl:     cholecalciferol (Vitamin D-3) 50 mcg (2,000 unit) capsule, Take 1 capsule (50 mcg) by mouth once daily., Disp: , Rfl:     diclofenac sodium (Voltaren) 1 % gel gel, Apply 0.5 Applications topically 4 times a day as needed., Disp: , Rfl:     estradiol valerate (Delestrogen) 20 mg/mL injection, Inject 1 mL (20 mg) into the muscle 1 (one) time per week. On Thursday @ 8pm, Disp: , Rfl:     ibuprofen 600 mg tablet, Take 1 tablet (600 mg) by mouth every 6 hours., Disp: 28 tablet, Rfl: 0    levoFLOXacin (Levaquin) 500 mg tablet, Take 1 tablet (500 mg) by mouth once daily., Disp: 14 tablet, Rfl: 0    lidocaine-prilocaine (Emla) 2.5-2.5 % cream, Apply topically., Disp: , Rfl:     MAGNESIUM MAL, THREON, CHELATE ORAL, Take 144 mg by mouth once daily in the evening. Take with meals., Disp: , Rfl:     melatonin 10 mg tablet, Take 1 tablet (10 mg) by mouth as needed at bedtime., Disp: , Rfl:     methocarbamol (Robaxin) 500 mg tablet, Take 1 tablet (500 mg) by mouth every 6 hours for 4 days., Disp: 16 tablet, Rfl: 0    MULTIVITAMIN ORAL, Take 1 tablet by mouth 2 times a day., Disp: , Rfl:     Nurtec ODT 75 mg tablet,disintegrating, Take 1 tablet (75 mg) by mouth once daily as needed., Disp: , Rfl:     ondansetron " ODT (Zofran-ODT) 4 mg disintegrating tablet, Take 1 tablet (4 mg) by mouth every 8 hours if needed for nausea or vomiting., Disp: 30 tablet, Rfl: 0    polyethylene glycol (Glycolax, Miralax) 17 gram packet, Take 17 g by mouth once daily. Do not start before October 7, 2023., Disp: 14 packet, Rfl: 0    potassium citrate CR (Urocit-K-10) 10 mEq ER tablet, Take 1 tablet (10 mEq) by mouth 2 times a day., Disp: , Rfl:     progesterone (Prometrium) 200 mg capsule, Take 1 capsule (200 mg) by mouth once daily at bedtime., Disp: , Rfl:     sennosides-docusate sodium (Mary-Colace) 8.6-50 mg tablet, Take 2 tablets by mouth 2 times a day., Disp: 14 tablet, Rfl: 0    sodium bicarbonate 650 mg tablet, Take 1 tablet (650 mg) by mouth 2 times a day. Before breakfast and at bedtime, Disp: , Rfl:     Surgilube gel, USE ONE GRAM THREE TIMES A DAY FOR VAGINAL DILATION FOR THREE MONTHS  THEN AS INSTRUCTED., Disp: , Rfl:     tamsulosin (Flomax) 0.4 mg 24 hr capsule, Take 1 capsule (0.4 mg) by mouth once daily in the evening. Take with meals., Disp: , Rfl:     topiramate (Topamax) 25 mg tablet, Take 2 tablets (50 mg) by mouth once daily., Disp: , Rfl:     vitamin B complex (Vitamins B Complex) tablet, Take 1 tablet by mouth 2 times a day with meals., Disp: , Rfl:      SOCIAL HISTORY:  Patient  reports that she has never smoked. She does not have any smokeless tobacco history on file. She reports that she does not currently use alcohol. She reports that she does not use drugs.   Social History     Socioeconomic History    Marital status: Single     Spouse name: Not on file    Number of children: Not on file    Years of education: Not on file    Highest education level: Not on file   Occupational History    Not on file   Tobacco Use    Smoking status: Never    Smokeless tobacco: Not on file   Substance and Sexual Activity    Alcohol use: Not Currently    Drug use: Never    Sexual activity: Not on file   Other Topics Concern    Not on file    Social History Narrative    Not on file     Social Determinants of Health     Financial Resource Strain: Low Risk  (10/9/2023)    Overall Financial Resource Strain (CARDIA)     Difficulty of Paying Living Expenses: Not hard at all   Food Insecurity: No Food Insecurity (10/9/2023)    Hunger Vital Sign     Worried About Running Out of Food in the Last Year: Never true     Ran Out of Food in the Last Year: Never true   Transportation Needs: No Transportation Needs (10/9/2023)    PRAPARE - Transportation     Lack of Transportation (Medical): No     Lack of Transportation (Non-Medical): No   Physical Activity: Inactive (10/9/2023)    Exercise Vital Sign     Days of Exercise per Week: 0 days     Minutes of Exercise per Session: 0 min   Stress: Not on file   Social Connections: Unknown (10/9/2023)    Social Connection and Isolation Panel [NHANES]     Frequency of Communication with Friends and Family: Not on file     Frequency of Social Gatherings with Friends and Family: Not on file     Attends [employer]al Services: Not on file     Active Member of Clubs or Organizations: Not on file     Attends Club or Organization Meetings: Not on file     Marital Status:    Intimate Partner Violence: Not At Risk (10/9/2023)    Humiliation, Afraid, Rape, and Kick questionnaire     Fear of Current or Ex-Partner: No     Emotionally Abused: No     Physically Abused: No     Sexually Abused: No   Housing Stability: Low Risk  (10/9/2023)    Housing Stability Vital Sign     Unable to Pay for Housing in the Last Year: No     Number of Places Lived in the Last Year: 0     Unstable Housing in the Last Year: No       FAMILY HISTORY:  Family History   Problem Relation Name Age of Onset    Breast cancer Mother      Dementia Mother      Hypertension Mother      Carpal tunnel syndrome Mother      Osteoarthritis Mother      Diabetes Other      Arthritis Other      Cancer Other      Hypertension Other      Alcohol abuse Other         REVIEW OF  SYSTEMS:  Constitutional: Negative for fever and chills.  Eyes: Negative for visual disturbance.   Respiratory: Negative for shortness of breath.    Cardiovascular: Negative for edema.   Gastrointestinal: Negative for nausea and vomiting.   Genitourinary: See interval history above.  Skin: Negative for rash.   Neurological: Negative for dizziness and numbness.   Psychiatric/Behavioral: Negative for decreased concentration.     PHYSICAL EXAM:  Visit Vitals  /85 (BP Location: Left arm, Patient Position: Sitting)   Pulse 87   Temp 36.3 °C (97.3 °F) (Temporal)     Constitutional: Patient appears well-developed and well-nourished. No distress.    Head: Normocephalic and atraumatic.    Neck: Normal range of motion.    Cardiovascular: Normal rate.    Pulmonary/Chest: Effort normal. No respiratory distress.   Abdominal: Mildly distended  : See below.  Musculoskeletal: Normal range of motion.    Neurological: Alert and oriented to person, place, and time.  Psychiatric: Normal mood and affect. Thought content normal.      LABORATORY REVIEW:   Lab Results   Component Value Date    BUN 17 10/24/2023    CREATININE 1.00 10/24/2023    EGFR 88 10/24/2023     10/24/2023    K 3.7 10/24/2023     (H) 10/24/2023    CO2 21 10/24/2023    CALCIUM 8.2 (L) 10/24/2023      Lab Results   Component Value Date    WBC 8.7 10/24/2023    RBC 3.85 (L) 10/24/2023    HGB 11.2 (L) 10/24/2023    HCT 34.2 (L) 10/24/2023    MCV 89 10/24/2023    MCH 29.1 10/24/2023    MCHC 32.7 10/24/2023    RDW 13.5 10/24/2023     10/24/2023    MPV 9.6 10/24/2023               Assessment:     Encounter Diagnosis   Name Primary?    Gender dysphoria Yes     Ashley Valencia is a 58 y.o. trans woman s/p PPT vaginoplasty on 10/3/23. Doing well post-op; excellent depth, healing very well. Mild swelling around base of vulva with slight induration. Linear area of slough noted at posterior fourchette with minimal erythema. Normal speculum exam, a  couple of remaining stitches visualized. On standing, appears to have some swelling over mons; more prominent at lateral edges creating a slight downward curve and overhang of tissue.     Plan:   Continue K citrate, tamsulosin as per Dr. Blake  Instructed to use bacitracin or Medihoney to posterior fourchette  No need to continue scheduled analgesics; may take prn  Ok to stop smallest dilator, work with three larger dilators  May consider returning to work on a reduced schedule in the next week or two  RTC in 3 weeks for reassessment or sooner if needed  Encouraged to call in the interim with any questions, concerns

## 2023-11-07 ENCOUNTER — ANESTHESIA EVENT (OUTPATIENT)
Dept: OPERATING ROOM | Facility: HOSPITAL | Age: 58
End: 2023-11-07
Payer: COMMERCIAL

## 2023-11-07 ENCOUNTER — ANESTHESIA (OUTPATIENT)
Dept: OPERATING ROOM | Facility: HOSPITAL | Age: 58
End: 2023-11-07
Payer: COMMERCIAL

## 2023-11-07 ENCOUNTER — HOSPITAL ENCOUNTER (OUTPATIENT)
Facility: HOSPITAL | Age: 58
Setting detail: OUTPATIENT SURGERY
Discharge: HOME | End: 2023-11-07
Attending: STUDENT IN AN ORGANIZED HEALTH CARE EDUCATION/TRAINING PROGRAM | Admitting: STUDENT IN AN ORGANIZED HEALTH CARE EDUCATION/TRAINING PROGRAM
Payer: COMMERCIAL

## 2023-11-07 PROBLEM — D64.9 ANEMIA: Status: ACTIVE | Noted: 2023-11-07

## 2023-11-07 PROCEDURE — 87086 URINE CULTURE/COLONY COUNT: CPT | Mod: PARLAB

## 2023-11-07 RX ORDER — DEXAMETHASONE SODIUM PHOSPHATE 10 MG/ML
6 INJECTION INTRAMUSCULAR; INTRAVENOUS ONCE
Status: CANCELLED | OUTPATIENT
Start: 2023-11-07 | End: 2023-11-07

## 2023-11-07 RX ORDER — ONDANSETRON HYDROCHLORIDE 2 MG/ML
4 INJECTION, SOLUTION INTRAVENOUS ONCE AS NEEDED
Status: CANCELLED | OUTPATIENT
Start: 2023-11-07

## 2023-11-07 RX ORDER — KETOROLAC TROMETHAMINE 30 MG/ML
30 INJECTION, SOLUTION INTRAMUSCULAR; INTRAVENOUS ONCE AS NEEDED
Status: CANCELLED | OUTPATIENT
Start: 2023-11-07 | End: 2023-11-12

## 2023-11-07 RX ORDER — DIPHENHYDRAMINE HYDROCHLORIDE 50 MG/ML
12.5 INJECTION INTRAMUSCULAR; INTRAVENOUS ONCE AS NEEDED
Status: CANCELLED | OUTPATIENT
Start: 2023-11-07

## 2023-11-07 RX ORDER — MEPERIDINE HYDROCHLORIDE 25 MG/ML
12.5 INJECTION INTRAMUSCULAR; INTRAVENOUS; SUBCUTANEOUS EVERY 10 MIN PRN
Status: CANCELLED | OUTPATIENT
Start: 2023-11-07

## 2023-11-07 RX ORDER — ACETAMINOPHEN 325 MG/1
650 TABLET ORAL EVERY 4 HOURS PRN
Status: CANCELLED | OUTPATIENT
Start: 2023-11-07

## 2023-11-07 RX ORDER — SODIUM CHLORIDE, SODIUM LACTATE, POTASSIUM CHLORIDE, CALCIUM CHLORIDE 600; 310; 30; 20 MG/100ML; MG/100ML; MG/100ML; MG/100ML
100 INJECTION, SOLUTION INTRAVENOUS CONTINUOUS
Status: CANCELLED | OUTPATIENT
Start: 2023-11-07

## 2023-11-07 RX ORDER — LIDOCAINE HYDROCHLORIDE 10 MG/ML
0.1 INJECTION, SOLUTION EPIDURAL; INFILTRATION; INTRACAUDAL; PERINEURAL ONCE
Status: CANCELLED | OUTPATIENT
Start: 2023-11-07 | End: 2023-11-07

## 2023-11-07 RX ORDER — AMOXICILLIN AND CLAVULANATE POTASSIUM 875; 125 MG/1; MG/1
1 TABLET, FILM COATED ORAL 2 TIMES DAILY
Qty: 20 TABLET | Refills: 0 | Status: SHIPPED | OUTPATIENT
Start: 2023-11-07 | End: 2023-11-17

## 2023-11-07 RX ORDER — ALBUTEROL SULFATE 0.83 MG/ML
2.5 SOLUTION RESPIRATORY (INHALATION) ONCE AS NEEDED
Status: CANCELLED | OUTPATIENT
Start: 2023-11-07

## 2023-11-07 RX ORDER — IPRATROPIUM BROMIDE 0.5 MG/2.5ML
500 SOLUTION RESPIRATORY (INHALATION) ONCE
Status: CANCELLED | OUTPATIENT
Start: 2023-11-07 | End: 2023-11-07

## 2023-11-07 NOTE — ANESTHESIA PREPROCEDURE EVALUATION
Patient: Ashley Valencia    Procedure Information       Date/Time: 11/07/23 1300    Procedure: CYSTOSCOPY/ BILATERAL URETEROSCOPY/ LASER LITHOTRIPSY/ STENT PLACEMENT WITH C-ARM (Bilateral)    Location: [facility] / [facility]    Surgeons: Alexa Blake MD            Relevant Problems   Cardiovascular   (+) HTN (hypertension)   (+) Hyperlipidemia      GI   (+) Gastrointestinal hemorrhage      /Renal   (+) CKD (chronic kidney disease), stage III (CMS/HCC)   (+) CRI (chronic renal insufficiency)   (+) Nephrolithiasis   (+) UTI (urinary tract infection)      Neuro/Psych   (+) Depression      Hematology   (+) Anemia   (+) Thrombocytopenia (CMS/HCC)      Infectious Disease   (+) UTI (urinary tract infection)       Clinical information reviewed:   Tobacco  Allergies  Meds  Problems  Med Hx  Surg Hx   Fam Hx  Soc   Hx        NPO Detail:  No data recorded     PHYSICAL EXAM    Anesthesia Plan    ASA 2     general     intravenous induction   Postoperative administration of opioids is intended.  Trial extubation is planned.  Anesthetic plan and risks discussed with patient.  Use of blood products discussed with patient who.    Plan discussed with CRNA.

## 2023-11-08 LAB — BACTERIA UR CULT: NO GROWTH

## 2023-11-14 ENCOUNTER — HOSPITAL ENCOUNTER (OUTPATIENT)
Facility: HOSPITAL | Age: 58
Setting detail: OUTPATIENT SURGERY
End: 2023-11-14
Attending: STUDENT IN AN ORGANIZED HEALTH CARE EDUCATION/TRAINING PROGRAM | Admitting: STUDENT IN AN ORGANIZED HEALTH CARE EDUCATION/TRAINING PROGRAM
Payer: COMMERCIAL

## 2023-11-14 DIAGNOSIS — N20.0 NEPHROLITHIASIS: ICD-10-CM

## 2023-11-15 VITALS — BODY MASS INDEX: 24.55 KG/M2 | WEIGHT: 181 LBS

## 2023-11-19 DIAGNOSIS — N20.0 NEPHROLITHIASIS: Primary | ICD-10-CM

## 2023-11-20 ENCOUNTER — LAB (OUTPATIENT)
Dept: LAB | Facility: LAB | Age: 58
End: 2023-11-20
Payer: COMMERCIAL

## 2023-11-20 ENCOUNTER — TELEPHONE (OUTPATIENT)
Dept: UROLOGY | Facility: CLINIC | Age: 58
End: 2023-11-20

## 2023-11-20 DIAGNOSIS — N20.0 NEPHROLITHIASIS: ICD-10-CM

## 2023-11-20 LAB
APPEARANCE UR: ABNORMAL
BACTERIA #/AREA URNS AUTO: ABNORMAL /HPF
BILIRUB UR STRIP.AUTO-MCNC: NEGATIVE MG/DL
COLOR UR: ABNORMAL
GLUCOSE UR STRIP.AUTO-MCNC: NEGATIVE MG/DL
HOLD SPECIMEN: NORMAL
KETONES UR STRIP.AUTO-MCNC: NEGATIVE MG/DL
LEUKOCYTE ESTERASE UR QL STRIP.AUTO: ABNORMAL
MUCOUS THREADS #/AREA URNS AUTO: ABNORMAL /LPF
NITRITE UR QL STRIP.AUTO: NEGATIVE
PH UR STRIP.AUTO: 6 [PH]
PROT UR STRIP.AUTO-MCNC: ABNORMAL MG/DL
RBC # UR STRIP.AUTO: ABNORMAL /UL
RBC #/AREA URNS AUTO: >20 /HPF
SP GR UR STRIP.AUTO: 1.02
UROBILINOGEN UR STRIP.AUTO-MCNC: <2 MG/DL
WBC #/AREA URNS AUTO: ABNORMAL /HPF

## 2023-11-20 PROCEDURE — 87086 URINE CULTURE/COLONY COUNT: CPT

## 2023-11-20 PROCEDURE — 81001 URINALYSIS AUTO W/SCOPE: CPT

## 2023-11-20 PROCEDURE — 87186 SC STD MICRODIL/AGAR DIL: CPT

## 2023-11-20 PROCEDURE — 87181 SC STD AGAR DILUTION PER AGT: CPT

## 2023-11-20 NOTE — TELEPHONE ENCOUNTER
Patient called to report increased flank pain over the weekend, she states she had to take two oxycodones to be comfortable through the night. On-call ordered a UA with reflex to culture, will monitor these results. In the meantime Dr. Blake was notified for further orders.

## 2023-11-20 NOTE — TELEPHONE ENCOUNTER
Pt called office and said she started to have a lot of pain over weekend. Also had pt leave a message on [facility]'s voicemail with more information

## 2023-11-22 ENCOUNTER — APPOINTMENT (OUTPATIENT)
Dept: UROLOGY | Facility: CLINIC | Age: 58
End: 2023-11-22
Payer: COMMERCIAL

## 2023-11-22 ENCOUNTER — OFFICE VISIT (OUTPATIENT)
Dept: UROLOGY | Facility: CLINIC | Age: 58
End: 2023-11-22
Payer: COMMERCIAL

## 2023-11-22 VITALS
HEIGHT: 72 IN | DIASTOLIC BLOOD PRESSURE: 97 MMHG | WEIGHT: 186 LBS | TEMPERATURE: 98.1 F | HEART RATE: 82 BPM | SYSTOLIC BLOOD PRESSURE: 157 MMHG | BODY MASS INDEX: 25.19 KG/M2

## 2023-11-22 DIAGNOSIS — F64.9 GENDER DYSPHORIA: Primary | ICD-10-CM

## 2023-11-22 PROCEDURE — 99024 POSTOP FOLLOW-UP VISIT: CPT | Performed by: NURSE PRACTITIONER

## 2023-11-22 PROCEDURE — 3080F DIAST BP >= 90 MM HG: CPT | Performed by: NURSE PRACTITIONER

## 2023-11-22 PROCEDURE — 3077F SYST BP >= 140 MM HG: CPT | Performed by: NURSE PRACTITIONER

## 2023-11-22 NOTE — H&P (VIEW-ONLY)
GENDER CARE POST-OP     HISTORY OF PRESENT ILLNESS:   Ashley Vaelncia is a 58 y.o. trans woman s/p PPT vaginoplasty 10/3/23.    INTERVAL HISTORY:  Returns today for POV  Seen unaccompanied  Reports doing well with 3 largest dilators  Ok with aesthetics, isn't sure what vulva should look like  No pain or bleeding  No discharge or odour  Notes irritative symptoms, likely from stents    PAST MEDICAL HISTORY:  Past Medical History:   Diagnosis Date    Calculus of kidney 01/28/2019    Kidney stone on left side    Depression     Fibromyalgia     Gender dysphoria     HLD (hyperlipidemia)     HTN (hypertension)     IBS (irritable bowel syndrome)     Migraines     Personal history of colonic polyps 05/07/2021    Unspecified hydronephrosis 10/08/2020    Hydronephrosis, left       PAST SURGICAL HISTORY:  Past Surgical History:   Procedure Laterality Date    HEMORRHOID SURGERY  12/11/2018    Hemorrhoidectomy    OTHER SURGICAL HISTORY  12/11/2018    Colonoscopy        ALLERGIES:   No Known Allergies     MEDICATIONS:   No current outpatient medications on file.     SOCIAL HISTORY:  Patient  reports that she has never smoked. She does not have any smokeless tobacco history on file. She reports that she does not currently use alcohol. She reports that she does not use drugs.   Social History     Socioeconomic History    Marital status: Single     Spouse name: Not on file    Number of children: Not on file    Years of education: Not on file    Highest education level: Not on file   Occupational History    Not on file   Tobacco Use    Smoking status: Never    Smokeless tobacco: Not on file   Substance and Sexual Activity    Alcohol use: Not Currently    Drug use: Never    Sexual activity: Not on file   Other Topics Concern    Not on file   Social History Narrative    Not on file     Social Determinants of Health     Financial Resource Strain: Low Risk  (10/9/2023)    Overall Financial Resource Strain (CARDIA)     Difficulty of  Paying Living Expenses: Not hard at all   Food Insecurity: No Food Insecurity (10/9/2023)    Hunger Vital Sign     Worried About Running Out of Food in the Last Year: Never true     Ran Out of Food in the Last Year: Never true   Transportation Needs: No Transportation Needs (10/9/2023)    PRAPARE - Transportation     Lack of Transportation (Medical): No     Lack of Transportation (Non-Medical): No   Physical Activity: Inactive (10/9/2023)    Exercise Vital Sign     Days of Exercise per Week: 0 days     Minutes of Exercise per Session: 0 min   Stress: Not on file   Social Connections: Unknown (10/9/2023)    Social Connection and Isolation Panel [NHANES]     Frequency of Communication with Friends and Family: Not on file     Frequency of Social Gatherings with Friends and Family: Not on file     Attends Tenriism Services: Not on file     Active Member of Clubs or Organizations: Not on file     Attends Club or Organization Meetings: Not on file     Marital Status:    Intimate Partner Violence: Not At Risk (10/9/2023)    Humiliation, Afraid, Rape, and Kick questionnaire     Fear of Current or Ex-Partner: No     Emotionally Abused: No     Physically Abused: No     Sexually Abused: No   Housing Stability: Low Risk  (10/9/2023)    Housing Stability Vital Sign     Unable to Pay for Housing in the Last Year: No     Number of Places Lived in the Last Year: 0     Unstable Housing in the Last Year: No       FAMILY HISTORY:  Family History   Problem Relation Name Age of Onset    Breast cancer Mother      Dementia Mother      Hypertension Mother      Carpal tunnel syndrome Mother      Osteoarthritis Mother      Diabetes Other      Arthritis Other      Cancer Other      Hypertension Other      Alcohol abuse Other         REVIEW OF SYSTEMS:  Constitutional: Negative for fever and chills.  Genitourinary: See interval history above.  Skin: Negative for rash.   Neurological: Negative for dizziness and numbness.     PHYSICAL  EXAM:  Visit Vitals  BP (!) 157/97   Pulse 82   Temp 36.7 °C (98.1 °F)     Constitutional: Patient appears well-developed and well-nourished. No distress.    Head: Normocephalic and atraumatic.    Neck: Normal range of motion.    Pulmonary/Chest: Effort normal. No respiratory distress.   Abdominal: Nondistended  : See below.  Integumentary: No rash or lesions.  Musculoskeletal: Normal range of motion.    Neurological: Alert and oriented to person, place, and time.  Psychiatric: Normal mood and affect. Thought content normal.      LABORATORY REVIEW:   Lab Results   Component Value Date    BUN 17 10/24/2023    CREATININE 1.00 10/24/2023    EGFR 88 10/24/2023     10/24/2023    K 3.7 10/24/2023     (H) 10/24/2023    CO2 21 10/24/2023    CALCIUM 8.2 (L) 10/24/2023      Lab Results   Component Value Date    WBC 8.7 10/24/2023    RBC 3.85 (L) 10/24/2023    HGB 11.2 (L) 10/24/2023    HCT 34.2 (L) 10/24/2023    MCV 89 10/24/2023    MCH 29.1 10/24/2023    MCHC 32.7 10/24/2023    RDW 13.5 10/24/2023     10/24/2023    MPV 9.6 10/24/2023               Assessment:     Encounter Diagnosis   Name Primary?    Gender dysphoria Yes     Ashley Valencia is a 58 y.o. trans woman s/p PPT vaginoplasty on 10/3/23. Doing well post-op; excellent depth, healing very well. Mildly prominent dog ears at proximal labial suture lines. Clitoris is not visualized due to heavy clitoral farmer. Urethral meatus appears to be slightly R of midline. Area of fibrinous exudate at posterior fourchette. Good vaginal tone, no stitches palpated in canal.     Plan:   Stent replacement next week with Dr. Blake  We would be happy to see her to remove stents if she isn't comfortable doing so at home  Instructed to use bacitracin or Medihoney to posterior fourchette  RTC in 4-6 weeks for reassessment or sooner if needed  Encouraged to call in the interim with any questions, concerns

## 2023-11-22 NOTE — PROGRESS NOTES
GENDER CARE POST-OP     HISTORY OF PRESENT ILLNESS:   Ashley Valencia is a 58 y.o. trans woman s/p PPT vaginoplasty 10/3/23.    INTERVAL HISTORY:  Returns today for POV  Seen unaccompanied  Reports doing well with 3 largest dilators  Ok with aesthetics, isn't sure what vulva should look like  No pain or bleeding  No discharge or odour  Notes irritative symptoms, likely from stents    PAST MEDICAL HISTORY:  Past Medical History:   Diagnosis Date    Calculus of kidney 01/28/2019    Kidney stone on left side    Depression     Fibromyalgia     Gender dysphoria     HLD (hyperlipidemia)     HTN (hypertension)     IBS (irritable bowel syndrome)     Migraines     Personal history of colonic polyps 05/07/2021    Unspecified hydronephrosis 10/08/2020    Hydronephrosis, left       PAST SURGICAL HISTORY:  Past Surgical History:   Procedure Laterality Date    HEMORRHOID SURGERY  12/11/2018    Hemorrhoidectomy    OTHER SURGICAL HISTORY  12/11/2018    Colonoscopy        ALLERGIES:   No Known Allergies     MEDICATIONS:   No current outpatient medications on file.     SOCIAL HISTORY:  Patient  reports that she has never smoked. She does not have any smokeless tobacco history on file. She reports that she does not currently use alcohol. She reports that she does not use drugs.   Social History     Socioeconomic History    Marital status: Single     Spouse name: Not on file    Number of children: Not on file    Years of education: Not on file    Highest education level: Not on file   Occupational History    Not on file   Tobacco Use    Smoking status: Never    Smokeless tobacco: Not on file   Substance and Sexual Activity    Alcohol use: Not Currently    Drug use: Never    Sexual activity: Not on file   Other Topics Concern    Not on file   Social History Narrative    Not on file     Social Determinants of Health     Financial Resource Strain: Low Risk  (10/9/2023)    Overall Financial Resource Strain (CARDIA)     Difficulty of  Paying Living Expenses: Not hard at all   Food Insecurity: No Food Insecurity (10/9/2023)    Hunger Vital Sign     Worried About Running Out of Food in the Last Year: Never true     Ran Out of Food in the Last Year: Never true   Transportation Needs: No Transportation Needs (10/9/2023)    PRAPARE - Transportation     Lack of Transportation (Medical): No     Lack of Transportation (Non-Medical): No   Physical Activity: Inactive (10/9/2023)    Exercise Vital Sign     Days of Exercise per Week: 0 days     Minutes of Exercise per Session: 0 min   Stress: Not on file   Social Connections: Unknown (10/9/2023)    Social Connection and Isolation Panel [NHANES]     Frequency of Communication with Friends and Family: Not on file     Frequency of Social Gatherings with Friends and Family: Not on file     Attends Tenriism Services: Not on file     Active Member of Clubs or Organizations: Not on file     Attends Club or Organization Meetings: Not on file     Marital Status:    Intimate Partner Violence: Not At Risk (10/9/2023)    Humiliation, Afraid, Rape, and Kick questionnaire     Fear of Current or Ex-Partner: No     Emotionally Abused: No     Physically Abused: No     Sexually Abused: No   Housing Stability: Low Risk  (10/9/2023)    Housing Stability Vital Sign     Unable to Pay for Housing in the Last Year: No     Number of Places Lived in the Last Year: 0     Unstable Housing in the Last Year: No       FAMILY HISTORY:  Family History   Problem Relation Name Age of Onset    Breast cancer Mother      Dementia Mother      Hypertension Mother      Carpal tunnel syndrome Mother      Osteoarthritis Mother      Diabetes Other      Arthritis Other      Cancer Other      Hypertension Other      Alcohol abuse Other         REVIEW OF SYSTEMS:  Constitutional: Negative for fever and chills.  Genitourinary: See interval history above.  Skin: Negative for rash.   Neurological: Negative for dizziness and numbness.     PHYSICAL  EXAM:  Visit Vitals  BP (!) 157/97   Pulse 82   Temp 36.7 °C (98.1 °F)     Constitutional: Patient appears well-developed and well-nourished. No distress.    Head: Normocephalic and atraumatic.    Neck: Normal range of motion.    Pulmonary/Chest: Effort normal. No respiratory distress.   Abdominal: Nondistended  : See below.  Integumentary: No rash or lesions.  Musculoskeletal: Normal range of motion.    Neurological: Alert and oriented to person, place, and time.  Psychiatric: Normal mood and affect. Thought content normal.      LABORATORY REVIEW:   Lab Results   Component Value Date    BUN 17 10/24/2023    CREATININE 1.00 10/24/2023    EGFR 88 10/24/2023     10/24/2023    K 3.7 10/24/2023     (H) 10/24/2023    CO2 21 10/24/2023    CALCIUM 8.2 (L) 10/24/2023      Lab Results   Component Value Date    WBC 8.7 10/24/2023    RBC 3.85 (L) 10/24/2023    HGB 11.2 (L) 10/24/2023    HCT 34.2 (L) 10/24/2023    MCV 89 10/24/2023    MCH 29.1 10/24/2023    MCHC 32.7 10/24/2023    RDW 13.5 10/24/2023     10/24/2023    MPV 9.6 10/24/2023               Assessment:     Encounter Diagnosis   Name Primary?    Gender dysphoria Yes     Ashley Valencia is a 58 y.o. trans woman s/p PPT vaginoplasty on 10/3/23. Doing well post-op; excellent depth, healing very well. Mildly prominent dog ears at proximal labial suture lines. Clitoris is not visualized due to heavy clitoral farmer. Urethral meatus appears to be slightly R of midline. Area of fibrinous exudate at posterior fourchette. Good vaginal tone, no stitches palpated in canal.     Plan:   Stent replacement next week with Dr. Blake  We would be happy to see her to remove stents if she isn't comfortable doing so at home  Instructed to use bacitracin or Medihoney to posterior fourchette  RTC in 4-6 weeks for reassessment or sooner if needed  Encouraged to call in the interim with any questions, concerns

## 2023-11-24 DIAGNOSIS — N39.0 URINARY TRACT INFECTION WITH HEMATURIA, SITE UNSPECIFIED: ICD-10-CM

## 2023-11-24 DIAGNOSIS — R31.9 URINARY TRACT INFECTION WITH HEMATURIA, SITE UNSPECIFIED: ICD-10-CM

## 2023-11-24 LAB — BACTERIA UR CULT: ABNORMAL

## 2023-11-27 RX ORDER — CEPHALEXIN 500 MG/1
500 CAPSULE ORAL 2 TIMES DAILY
Qty: 14 CAPSULE | Refills: 0 | Status: SHIPPED | OUTPATIENT
Start: 2023-11-27 | End: 2023-12-04

## 2023-11-28 ENCOUNTER — ANESTHESIA (OUTPATIENT)
Dept: OPERATING ROOM | Facility: HOSPITAL | Age: 58
End: 2023-11-28
Payer: COMMERCIAL

## 2023-11-28 ENCOUNTER — APPOINTMENT (OUTPATIENT)
Dept: RADIOLOGY | Facility: HOSPITAL | Age: 58
End: 2023-11-28
Payer: COMMERCIAL

## 2023-11-28 ENCOUNTER — HOSPITAL ENCOUNTER (OUTPATIENT)
Facility: HOSPITAL | Age: 58
Setting detail: OUTPATIENT SURGERY
Discharge: HOME | End: 2023-11-28
Attending: STUDENT IN AN ORGANIZED HEALTH CARE EDUCATION/TRAINING PROGRAM | Admitting: STUDENT IN AN ORGANIZED HEALTH CARE EDUCATION/TRAINING PROGRAM
Payer: COMMERCIAL

## 2023-11-28 ENCOUNTER — ANESTHESIA EVENT (OUTPATIENT)
Dept: OPERATING ROOM | Facility: HOSPITAL | Age: 58
End: 2023-11-28
Payer: COMMERCIAL

## 2023-11-28 VITALS
HEART RATE: 91 BPM | RESPIRATION RATE: 18 BRPM | TEMPERATURE: 97 F | SYSTOLIC BLOOD PRESSURE: 153 MMHG | WEIGHT: 181 LBS | DIASTOLIC BLOOD PRESSURE: 102 MMHG | BODY MASS INDEX: 24.52 KG/M2 | OXYGEN SATURATION: 98 % | HEIGHT: 72 IN

## 2023-11-28 DIAGNOSIS — N20.0 NEPHROLITHIASIS: Primary | ICD-10-CM

## 2023-11-28 PROCEDURE — 7100000010 HC PHASE TWO TIME - EACH INCREMENTAL 1 MINUTE: Performed by: STUDENT IN AN ORGANIZED HEALTH CARE EDUCATION/TRAINING PROGRAM

## 2023-11-28 PROCEDURE — C1758 CATHETER, URETERAL: HCPCS | Performed by: STUDENT IN AN ORGANIZED HEALTH CARE EDUCATION/TRAINING PROGRAM

## 2023-11-28 PROCEDURE — 7100000001 HC RECOVERY ROOM TIME - INITIAL BASE CHARGE: Performed by: STUDENT IN AN ORGANIZED HEALTH CARE EDUCATION/TRAINING PROGRAM

## 2023-11-28 PROCEDURE — 7100000002 HC RECOVERY ROOM TIME - EACH INCREMENTAL 1 MINUTE: Performed by: STUDENT IN AN ORGANIZED HEALTH CARE EDUCATION/TRAINING PROGRAM

## 2023-11-28 PROCEDURE — 2500000004 HC RX 250 GENERAL PHARMACY W/ HCPCS (ALT 636 FOR OP/ED): Performed by: ANESTHESIOLOGY

## 2023-11-28 PROCEDURE — 52352 CYSTOURETERO W/STONE REMOVE: CPT | Performed by: STUDENT IN AN ORGANIZED HEALTH CARE EDUCATION/TRAINING PROGRAM

## 2023-11-28 PROCEDURE — 7100000009 HC PHASE TWO TIME - INITIAL BASE CHARGE: Performed by: STUDENT IN AN ORGANIZED HEALTH CARE EDUCATION/TRAINING PROGRAM

## 2023-11-28 PROCEDURE — 2500000004 HC RX 250 GENERAL PHARMACY W/ HCPCS (ALT 636 FOR OP/ED): Performed by: ANESTHESIOLOGIST ASSISTANT

## 2023-11-28 PROCEDURE — C1769 GUIDE WIRE: HCPCS | Performed by: STUDENT IN AN ORGANIZED HEALTH CARE EDUCATION/TRAINING PROGRAM

## 2023-11-28 PROCEDURE — A52356 PR CYSTO/URETERO W/LITHOTRIPSY  AND INDWELL STENT INSRT: Performed by: ANESTHESIOLOGIST ASSISTANT

## 2023-11-28 PROCEDURE — 74420 UROGRAPHY RTRGR +-KUB: CPT | Performed by: STUDENT IN AN ORGANIZED HEALTH CARE EDUCATION/TRAINING PROGRAM

## 2023-11-28 PROCEDURE — 2780000003 HC OR 278 NO HCPCS: Performed by: STUDENT IN AN ORGANIZED HEALTH CARE EDUCATION/TRAINING PROGRAM

## 2023-11-28 PROCEDURE — C2617 STENT, NON-COR, TEM W/O DEL: HCPCS | Performed by: STUDENT IN AN ORGANIZED HEALTH CARE EDUCATION/TRAINING PROGRAM

## 2023-11-28 PROCEDURE — 2500000005 HC RX 250 GENERAL PHARMACY W/O HCPCS: Performed by: ANESTHESIOLOGIST ASSISTANT

## 2023-11-28 PROCEDURE — 3700000002 HC GENERAL ANESTHESIA TIME - EACH INCREMENTAL 1 MINUTE: Performed by: STUDENT IN AN ORGANIZED HEALTH CARE EDUCATION/TRAINING PROGRAM

## 2023-11-28 PROCEDURE — A52356 PR CYSTO/URETERO W/LITHOTRIPSY  AND INDWELL STENT INSRT: Performed by: ANESTHESIOLOGY

## 2023-11-28 PROCEDURE — 76000 FLUOROSCOPY <1 HR PHYS/QHP: CPT

## 2023-11-28 PROCEDURE — 2550000001 HC RX 255 CONTRASTS: Performed by: STUDENT IN AN ORGANIZED HEALTH CARE EDUCATION/TRAINING PROGRAM

## 2023-11-28 PROCEDURE — 2720000007 HC OR 272 NO HCPCS: Performed by: STUDENT IN AN ORGANIZED HEALTH CARE EDUCATION/TRAINING PROGRAM

## 2023-11-28 PROCEDURE — 3700000001 HC GENERAL ANESTHESIA TIME - INITIAL BASE CHARGE: Performed by: STUDENT IN AN ORGANIZED HEALTH CARE EDUCATION/TRAINING PROGRAM

## 2023-11-28 PROCEDURE — 3600000003 HC OR TIME - INITIAL BASE CHARGE - PROCEDURE LEVEL THREE: Performed by: STUDENT IN AN ORGANIZED HEALTH CARE EDUCATION/TRAINING PROGRAM

## 2023-11-28 PROCEDURE — 52356 CYSTO/URETERO W/LITHOTRIPSY: CPT | Performed by: STUDENT IN AN ORGANIZED HEALTH CARE EDUCATION/TRAINING PROGRAM

## 2023-11-28 PROCEDURE — 3600000008 HC OR TIME - EACH INCREMENTAL 1 MINUTE - PROCEDURE LEVEL THREE: Performed by: STUDENT IN AN ORGANIZED HEALTH CARE EDUCATION/TRAINING PROGRAM

## 2023-11-28 RX ORDER — KETOROLAC TROMETHAMINE 30 MG/ML
30 INJECTION, SOLUTION INTRAMUSCULAR; INTRAVENOUS ONCE AS NEEDED
Status: DISCONTINUED | OUTPATIENT
Start: 2023-11-28 | End: 2023-11-28 | Stop reason: HOSPADM

## 2023-11-28 RX ORDER — ONDANSETRON HYDROCHLORIDE 2 MG/ML
4 INJECTION, SOLUTION INTRAVENOUS ONCE AS NEEDED
Status: DISCONTINUED | OUTPATIENT
Start: 2023-11-28 | End: 2023-11-28 | Stop reason: HOSPADM

## 2023-11-28 RX ORDER — CEFAZOLIN SODIUM 2 G/100ML
2 INJECTION, SOLUTION INTRAVENOUS ONCE
Status: DISCONTINUED | OUTPATIENT
Start: 2023-11-28 | End: 2023-11-28 | Stop reason: HOSPADM

## 2023-11-28 RX ORDER — ALBUTEROL SULFATE 0.83 MG/ML
2.5 SOLUTION RESPIRATORY (INHALATION) ONCE AS NEEDED
Status: DISCONTINUED | OUTPATIENT
Start: 2023-11-28 | End: 2023-11-28 | Stop reason: HOSPADM

## 2023-11-28 RX ORDER — FENTANYL CITRATE 50 UG/ML
INJECTION, SOLUTION INTRAMUSCULAR; INTRAVENOUS AS NEEDED
Status: DISCONTINUED | OUTPATIENT
Start: 2023-11-28 | End: 2023-11-28

## 2023-11-28 RX ORDER — OXYCODONE HYDROCHLORIDE 5 MG/1
5 TABLET ORAL EVERY 6 HOURS PRN
Qty: 8 TABLET | Refills: 0 | Status: SHIPPED | OUTPATIENT
Start: 2023-11-28 | End: 2024-01-08 | Stop reason: ALTCHOICE

## 2023-11-28 RX ORDER — MEPERIDINE HYDROCHLORIDE 25 MG/ML
12.5 INJECTION INTRAMUSCULAR; INTRAVENOUS; SUBCUTANEOUS EVERY 10 MIN PRN
Status: DISCONTINUED | OUTPATIENT
Start: 2023-11-28 | End: 2023-11-28 | Stop reason: HOSPADM

## 2023-11-28 RX ORDER — ACETAMINOPHEN 325 MG/1
650 TABLET ORAL EVERY 4 HOURS PRN
Status: DISCONTINUED | OUTPATIENT
Start: 2023-11-28 | End: 2023-11-28 | Stop reason: HOSPADM

## 2023-11-28 RX ORDER — CEFAZOLIN 1 G/1
INJECTION, POWDER, FOR SOLUTION INTRAVENOUS AS NEEDED
Status: DISCONTINUED | OUTPATIENT
Start: 2023-11-28 | End: 2023-11-28

## 2023-11-28 RX ORDER — IPRATROPIUM BROMIDE 0.5 MG/2.5ML
500 SOLUTION RESPIRATORY (INHALATION) ONCE
Status: DISCONTINUED | OUTPATIENT
Start: 2023-11-28 | End: 2023-11-28 | Stop reason: HOSPADM

## 2023-11-28 RX ORDER — ONDANSETRON HYDROCHLORIDE 2 MG/ML
INJECTION, SOLUTION INTRAVENOUS AS NEEDED
Status: DISCONTINUED | OUTPATIENT
Start: 2023-11-28 | End: 2023-11-28

## 2023-11-28 RX ORDER — GENTAMICIN 40 MG/ML
INJECTION, SOLUTION INTRAMUSCULAR; INTRAVENOUS AS NEEDED
Status: DISCONTINUED | OUTPATIENT
Start: 2023-11-28 | End: 2023-11-28

## 2023-11-28 RX ORDER — PROPOFOL 10 MG/ML
INJECTION, EMULSION INTRAVENOUS AS NEEDED
Status: DISCONTINUED | OUTPATIENT
Start: 2023-11-28 | End: 2023-11-28

## 2023-11-28 RX ORDER — DEXAMETHASONE SODIUM PHOSPHATE 10 MG/ML
6 INJECTION INTRAMUSCULAR; INTRAVENOUS ONCE
Status: DISCONTINUED | OUTPATIENT
Start: 2023-11-28 | End: 2023-11-28 | Stop reason: HOSPADM

## 2023-11-28 RX ORDER — LIDOCAINE HYDROCHLORIDE 10 MG/ML
0.1 INJECTION, SOLUTION EPIDURAL; INFILTRATION; INTRACAUDAL; PERINEURAL ONCE
Status: DISCONTINUED | OUTPATIENT
Start: 2023-11-28 | End: 2023-11-28 | Stop reason: HOSPADM

## 2023-11-28 RX ORDER — DIPHENHYDRAMINE HYDROCHLORIDE 50 MG/ML
12.5 INJECTION INTRAMUSCULAR; INTRAVENOUS ONCE AS NEEDED
Status: DISCONTINUED | OUTPATIENT
Start: 2023-11-28 | End: 2023-11-28 | Stop reason: HOSPADM

## 2023-11-28 RX ORDER — KETOROLAC TROMETHAMINE 10 MG/1
10 TABLET, FILM COATED ORAL EVERY 6 HOURS PRN
Qty: 20 TABLET | Refills: 0 | Status: SHIPPED | OUTPATIENT
Start: 2023-11-28 | End: 2024-01-08 | Stop reason: ALTCHOICE

## 2023-11-28 RX ORDER — SODIUM CHLORIDE, SODIUM LACTATE, POTASSIUM CHLORIDE, CALCIUM CHLORIDE 600; 310; 30; 20 MG/100ML; MG/100ML; MG/100ML; MG/100ML
100 INJECTION, SOLUTION INTRAVENOUS CONTINUOUS
Status: DISCONTINUED | OUTPATIENT
Start: 2023-11-28 | End: 2023-11-28 | Stop reason: HOSPADM

## 2023-11-28 RX ORDER — DEXAMETHASONE SODIUM PHOSPHATE 4 MG/ML
INJECTION, SOLUTION INTRA-ARTICULAR; INTRALESIONAL; INTRAMUSCULAR; INTRAVENOUS; SOFT TISSUE AS NEEDED
Status: DISCONTINUED | OUTPATIENT
Start: 2023-11-28 | End: 2023-11-28

## 2023-11-28 RX ORDER — MIDAZOLAM HYDROCHLORIDE 1 MG/ML
INJECTION, SOLUTION INTRAMUSCULAR; INTRAVENOUS AS NEEDED
Status: DISCONTINUED | OUTPATIENT
Start: 2023-11-28 | End: 2023-11-28

## 2023-11-28 RX ORDER — LIDOCAINE HCL/PF 100 MG/5ML
SYRINGE (ML) INTRAVENOUS AS NEEDED
Status: DISCONTINUED | OUTPATIENT
Start: 2023-11-28 | End: 2023-11-28

## 2023-11-28 RX ORDER — MEPERIDINE HYDROCHLORIDE 25 MG/ML
INJECTION INTRAMUSCULAR; INTRAVENOUS; SUBCUTANEOUS
Status: DISCONTINUED
Start: 2023-11-28 | End: 2023-11-28 | Stop reason: HOSPADM

## 2023-11-28 RX ORDER — SODIUM CHLORIDE 9 MG/ML
100 INJECTION, SOLUTION INTRAVENOUS CONTINUOUS
Status: DISCONTINUED | OUTPATIENT
Start: 2023-11-28 | End: 2023-11-28 | Stop reason: HOSPADM

## 2023-11-28 RX ADMIN — DEXAMETHASONE SODIUM PHOSPHATE 4 MG: 4 INJECTION, SOLUTION INTRAMUSCULAR; INTRAVENOUS at 09:15

## 2023-11-28 RX ADMIN — GENTAMICIN SULFATE 240 MG: 40 INJECTION, SOLUTION INTRAMUSCULAR; INTRAVENOUS at 09:17

## 2023-11-28 RX ADMIN — FENTANYL CITRATE 100 MCG: 50 INJECTION, SOLUTION INTRAMUSCULAR; INTRAVENOUS at 09:08

## 2023-11-28 RX ADMIN — SODIUM CHLORIDE, POTASSIUM CHLORIDE, SODIUM LACTATE AND CALCIUM CHLORIDE: 600; 310; 30; 20 INJECTION, SOLUTION INTRAVENOUS at 08:59

## 2023-11-28 RX ADMIN — FENTANYL CITRATE 50 MCG: 50 INJECTION, SOLUTION INTRAMUSCULAR; INTRAVENOUS at 10:09

## 2023-11-28 RX ADMIN — CEFAZOLIN 2 G: 1 INJECTION, POWDER, FOR SOLUTION INTRAMUSCULAR; INTRAVENOUS at 09:14

## 2023-11-28 RX ADMIN — MIDAZOLAM 2 MG: 1 INJECTION INTRAMUSCULAR; INTRAVENOUS at 09:00

## 2023-11-28 RX ADMIN — FENTANYL CITRATE 50 MCG: 50 INJECTION, SOLUTION INTRAMUSCULAR; INTRAVENOUS at 09:40

## 2023-11-28 RX ADMIN — ONDANSETRON 4 MG: 2 INJECTION INTRAMUSCULAR; INTRAVENOUS at 09:58

## 2023-11-28 RX ADMIN — MEPERIDINE HYDROCHLORIDE 12.5 MG: 25 INJECTION INTRAMUSCULAR; INTRAVENOUS; SUBCUTANEOUS at 10:51

## 2023-11-28 RX ADMIN — PROPOFOL 200 MG: 10 INJECTION, EMULSION INTRAVENOUS at 09:08

## 2023-11-28 RX ADMIN — MEPERIDINE HYDROCHLORIDE 12.5 MG: 25 INJECTION INTRAMUSCULAR; INTRAVENOUS; SUBCUTANEOUS at 10:26

## 2023-11-28 RX ADMIN — LIDOCAINE HYDROCHLORIDE 100 MG: 20 INJECTION INTRAVENOUS at 09:08

## 2023-11-28 SDOH — HEALTH STABILITY: MENTAL HEALTH: CURRENT SMOKER: 0

## 2023-11-28 ASSESSMENT — COLUMBIA-SUICIDE SEVERITY RATING SCALE - C-SSRS
2. HAVE YOU ACTUALLY HAD ANY THOUGHTS OF KILLING YOURSELF?: NO
6. HAVE YOU EVER DONE ANYTHING, STARTED TO DO ANYTHING, OR PREPARED TO DO ANYTHING TO END YOUR LIFE?: NO
1. IN THE PAST MONTH, HAVE YOU WISHED YOU WERE DEAD OR WISHED YOU COULD GO TO SLEEP AND NOT WAKE UP?: NO

## 2023-11-28 ASSESSMENT — PAIN SCALES - GENERAL: PAIN_LEVEL: 0

## 2023-11-28 NOTE — ANESTHESIA POSTPROCEDURE EVALUATION
Patient: Ashley Valencia    Procedure Summary       Date: 11/28/23 Room / Location: [facility] 03 / [facility]    Anesthesia Start: 0859 Anesthesia Stop: 1019    Procedure: CYSTOSCOPY/ BILATERAL URETEROSCOPY/ LEFT LASER LITHOTRIPSY/ LEFT URETER STENT PLACEMENT WITH C-ARM/ STONE BASKETING (Bilateral) Diagnosis:       Nephrolithiasis      (Nephrolithiasis [N20.0])    Surgeons: Alexa Blake MD Responsible Provider: Karthikeyan Lorenzo MD    Anesthesia Type: general ASA Status: 3            Anesthesia Type: general    Vitals Value Taken Time   /97 11/28/23 1018   Temp 36.1 °C (97 °F) 11/28/23 1018   Pulse 84 11/28/23 1018   Resp 16 11/28/23 1018   SpO2 100 % 11/28/23 1018       Anesthesia Post Evaluation    Patient location during evaluation: PACU  Patient participation: complete - patient participated  Level of consciousness: awake  Pain score: 0  Pain management: adequate  Airway patency: patent  Cardiovascular status: acceptable  Respiratory status: acceptable  Hydration status: acceptable  Postoperative Nausea and Vomiting: none        There were no known notable events for this encounter.

## 2023-11-28 NOTE — ANESTHESIA PROCEDURE NOTES
Airway  Date/Time: 11/28/2023 9:09 AM  Urgency: elective    Airway not difficult    Staffing  Performed: SELMA   Authorized by: Karthikeyan Lorenzo MD    Performed by: SELMA Ariaza  Patient location during procedure: OR    Indications and Patient Condition  Indications for airway management: anesthesia  Spontaneous ventilation: present  Sedation level: deep  Preoxygenated: yes  Patient position: sniffing  MILS maintained throughout  Mask difficulty assessment: 0 - not attempted  Planned trial extubation    Final Airway Details  Final airway type: supraglottic airway      Successful airway: flexible  Size 4     Number of attempts at approach: 1    Additional Comments  Lips and teeth in preanesthetic condition

## 2023-11-28 NOTE — ANESTHESIA PREPROCEDURE EVALUATION
Patient: Ashley Valencia    Procedure Information       Date/Time: 11/28/23 0830    Procedure: CYSTOSCOPY/ BILATERAL URETEROSCOPY/ LASER LITHOTRIPSY/ STENT PLACEMENT WITH C-ARM (Bilateral)    Location: [facility] / [facility]    Surgeons: Alexa Blake MD            Relevant Problems   Cardiovascular   (+) HTN (hypertension)   (+) Hyperlipidemia      GI   (+) Gastrointestinal hemorrhage      /Renal   (+) CKD (chronic kidney disease), stage III (CMS/HCC)   (+) CRI (chronic renal insufficiency)   (+) Nephrolithiasis   (+) UTI (urinary tract infection)      Neuro/Psych   (+) Depression      Hematology   (+) Anemia   (+) Thrombocytopenia (CMS/HCC)      Infectious Disease   (+) UTI (urinary tract infection)       Clinical information reviewed:   Tobacco  Allergies  Meds   Med Hx  Surg Hx   Fam Hx  Soc Hx        NPO Detail:  NPO/Void Status  Date of Last Liquid: 11/27/23  Time of Last Liquid: 1900  Date of Last Solid: 11/27/23  Time of Last Solid: 2200  Last Intake Type: Clear fluids         Physical Exam    Airway  Mallampati: II  TM distance: >3 FB  Neck ROM: full     Cardiovascular   Rhythm: regular  Rate: normal     Dental - normal exam     Pulmonary    Abdominal        Anesthesia Plan    ASA 3     general     The patient is not a current smoker.  Patient was not previously instructed to abstain from smoking on day of procedure.  Patient did not smoke on day of procedure.    intravenous induction   Postoperative administration of opioids is intended.  Anesthetic plan and risks discussed with patient.  Use of blood products discussed with patient who.    Plan discussed with CAA.

## 2023-11-28 NOTE — DISCHARGE INSTRUCTIONS
DEPARTMENT OF Urology  DISCHARGE INSTRUCTIONS -- Ureteroscopy Laser Lithotripsy  Outpatient Surgery    C O N F I D E N T I A L   I N F O R M A T I O N    Ashely Valencia    Call [phone] during regular daytime business hours (8:00 am - 5:00 pm) and after 5:00 pm ask for the Urology resident with any questions or concerns.    If it is a life-threatening situation, proceed to the nearest emergency department.        Follow-up appointment:        Date: 11/30/2023                  Dept: [facility]                  Provider: Denia Barrios                  Time: 4:00 PM          Thank you for the opportunity to care for you today.  Your health and healing are very important to us.  We hope we made you feel as comfortable as possible and are committed to your recovery and continued well-being.      The following is a brief overview of your Ureteroscopy Laser Lithotripsy procedure today. Some of the information contained on this summary may be confidential.  This information should be kept in your records and should be shared with your regular doctor.    Physicians:   Dr. Alexa Blake    Procedure performed: Lasering of your kidney stone    What to Expect During your Recovery and Home Care  Anesthesia Side Effects   You received anesthesia today.  You may feel sleepy, tired, or have a sore throat.   You may also feel drowsiness, dizziness, or inability to think clearly.  For your safety, do not drive, drink alcoholic beverages, take any unprescribed medication or make any important decisions for 24 hours.  A responsible adult should be with you for 24 hours.        Activity and Recovery    No heavy lifting today. Rest for the next 24 hours.    Pain Control  Unfortunately, you may experience pain after your procedure.  Frequency and urgency to urinate and mild discomfort are expected. Adequate pain management can include alternative measures to help ease your pain and that can include over the  counter Tylenol and the prescribed Toradol which can be taken as prescribed as needed for breakthrough pain. Do not take more than 4,000mg of Tylenol in a 24-hour period.      Nausea/Vomiting   Clear liquids are best tolerated at first. Start slow, advance your diet as tolerated to normal foods. Avoid spicy, greasy, heavy foods at first. Also, you may feel nauseous or like you need to vomit if you take any type of medication on an empty stomach.  Call your physician if you are unable to eat or drink and have persistent vomiting.    Signs of Bleeding   You could have some blood in your urine off and on over the next several weeks. Your urine will be light pink to yellow.    Treatment/wound care:   It is okay to shower after surgery.    Signs of Infection  Signs of infection can include fever, chills, burning sensation with passing of urine, or severe abdominal pain.  If you see any of these occur, please contact your doctor's office at [phone].  Any fever higher than 100.4, especially if associated with an ill feeling, abdominal pain, chills, or nausea should be reported to your surgeon.      Assist in bowel movements/urination  Increase fiber in diet  Increase water (6 to 8 glasses)  Increase walking   Urination should occur within 6 hours of anesthesia  If you have tried these methods and your bladder still feels full and you cannot use the bathroom, please go to your nearest Emergency room/contact your physician.    Additional Instructions:   Pieces of your kidney stone may pass in the urine for a few days and may cause some mild pain. You also had a stent placed today from your kidney down into your bladder. This helps keep the urinary tract open and prevents blockages of urine flow. The stent can be irritating and can cause some frequency, urgency and blood in your urine when you go to the bathroom. It is important to drink plenty of water and take medications as prescribed. You may be sent home with  Pyridium which turns your urine bright orange. Applying a heating pad to your back will also help with this kind of pain. It will be determined at your follow up appointment when the stent will be removed.

## 2023-11-29 ASSESSMENT — PAIN SCALES - GENERAL: PAINLEVEL_OUTOF10: 0 - NO PAIN

## 2023-11-29 NOTE — OP NOTE
CYSTOSCOPY/ BILATERAL URETEROSCOPY/ LEFT LASER LITHOTRIPSY/ LEFT URETER STENT PLACEMENT WITH C-ARM/ STONE BASKETING (B) Operative Note     Date: 2023  OR Location: [facility]    Name: Ashley Valencia, : 1965, Age: 58 y.o., MRN: [id number], Sex: adult    Diagnosis  Pre-op Diagnosis     * Nephrolithiasis [N20.0] Post-op Diagnosis     * Nephrolithiasis [N20.0]     Procedures  CYSTOSCOPY/ BILATERAL URETEROSCOPY/ LEFT LASER LITHOTRIPSY/ LEFT URETER STENT PLACEMENT WITH C-ARM/ STONE BASKETING  [id number] - OR CYSTO/URETERO W/LITHOTRIPSY &INDWELL STENT INSRT      Surgeons      * Alexa Blake - Primary    Resident/Fellow/Other Assistant:  Surgeon(s) and Role:    Procedure Summary  Anesthesia: General  ASA: III  Anesthesia Staff: Anesthesiologist: Karthikeyan Lorenzo MD  C-AA: SELMA Araiza  Estimated Blood Loss: 10ccmL  Intra-op Medications: * No intraprocedure medications in log *           Anesthesia Record               Intraprocedure I/O Totals          Intake    .00 mL    Propofol Drip 0.00 mL    The total shown is the total volume documented since Anesthesia Start was filed.    Total Intake 900 mL          Specimen: No specimens collected     Staff:   Circulator: Che Morales RN  Relief Circulator: Preeti Vitale RN  Scrub Person: Lily Avalos         Drains and/or Catheters: * None in log *    Tourniquet Times:         Implants: 6x26cm JJ stent in left ureter    Findings: No stone was found in the right kidney or ureter.  There was some small fragments which were cleared with irrigation.  left lower pole stone was treated completely.    Indications: Ashley Valencia is an 58 y.o. adult who is having surgery for Nephrolithiasis [N20.0].     The patient was seen in the preoperative area. The risks, benefits, complications, treatment options, non-operative alternatives, expected recovery and outcomes were discussed with the patient. The possibilities of reaction to medication, pulmonary  aspiration, injury to surrounding structures, bleeding, recurrent infection, the need for additional procedures, failure to diagnose a condition, and creating a complication requiring transfusion or operation were discussed with the patient. The patient concurred with the proposed plan, giving informed consent.  The site of surgery was properly noted/marked if necessary per policy. The patient has been actively warmed in preoperative area. Preoperative antibiotics have been ordered and given within 1 hours of incision. Venous thrombosis prophylaxis have been ordered including bilateral sequential compression devices    Procedure Details:   Cystoscopy  A rigid 22 Beninese cystoscope was inserted atraumatically into the urethra.  The urethral anatomy was normal.  A full cystoscopy was performed and bilateral ureteral orificies were present in their orthotopic position. The right ureter was cannulated with a 5 Beninese open-ended catheter retrograde pyelogram was performed.  Subsequently a wire was advanced into the renal pelvis and a second wire was established using a dual-lumen catheter.     One of the wires was secured a safety and over the second wire the ureteroscope was advanced into the renal pelvis.  Subsequently full pyeloscopy was performed.     Small stone fragments were irrigated under pressure.  Subsequently the ureteroscope was removed under direct vision and no other stone fragments were visualized.  We elected not to leave a stent on the right side.      This was repeated on the left side.  Pyeloscopy was performed and stones were identified in the lower pole.  Subsequently a 200 µm fiber was used to fragment the stone into small pieces.      The ureteroscope was removed under direct vision.  There were no large fragments remaining.    A rigid cystoscope was inserted.  A 6 x 26 cm double-J stent was inserted into the left ureter under direct vision.  The proximal curl was confirmed under fluoroscopy and  the distal curl was confirmed under direct vision.  The bladder was emptied and the patient was awoken from anesthesia.    Complications:  None; patient tolerated the procedure well.    Disposition: PACU - hemodynamically stable.  Condition: stable         Additional Details: The stent was left on a string and will be removed next week.  Ultrasound should be performed 1 month after stent removal.    Attending Attestation: I performed the entire procedure.    Alexa Blake  Phone Number: [phone]

## 2023-11-30 ENCOUNTER — OFFICE VISIT (OUTPATIENT)
Dept: UROLOGY | Facility: CLINIC | Age: 58
End: 2023-11-30
Payer: COMMERCIAL

## 2023-11-30 DIAGNOSIS — N20.0 NEPHROLITHIASIS: Primary | ICD-10-CM

## 2023-11-30 PROCEDURE — 99212 OFFICE O/P EST SF 10 MIN: CPT | Performed by: NURSE PRACTITIONER

## 2023-11-30 PROCEDURE — 1036F TOBACCO NON-USER: CPT | Performed by: NURSE PRACTITIONER

## 2023-11-30 NOTE — PROGRESS NOTES
/UROLOGIC FOLLOW-UP VISIT    / PROBLEM LIST:  No diagnosis found.     HISTORY OF PRESENT ILLNESS:   Ashley Valencia is a 58 y.o. with nephrolithiasis s/p cysto & ureteroscopy and B stent placement per Dr. Blake 9/26/23. Underwent repeat cysto & ureteroscopy with L laser lithotripsy and ureteral stent placement.     INTERVAL HISTORY:  Returns today for stent removal  Seen unaccompanied  Doing well overall  Notes some difficulty dilating with stent string in place  No flank pain, no hematuria  No fever or chills    PAST MEDICAL HISTORY:  Past Medical History:   Diagnosis Date    Calculus of kidney 01/28/2019    Kidney stone on left side    Depression     Fibromyalgia     Gender dysphoria     HLD (hyperlipidemia)     HTN (hypertension)     IBS (irritable bowel syndrome)     Migraines     Personal history of colonic polyps 05/07/2021    Unspecified hydronephrosis 10/08/2020    Hydronephrosis, left       PAST SURGICAL HISTORY:  Past Surgical History:   Procedure Laterality Date    HEMORRHOID SURGERY  12/11/2018    Hemorrhoidectomy    OTHER SURGICAL HISTORY  12/11/2018    Colonoscopy        ALLERGIES:   No Known Allergies     MEDICATIONS:   Current Outpatient Medications on File Prior to Visit   Medication Sig Dispense Refill    alcohol swabs pads, medicated       allopurinol (Zyloprim) 100 mg tablet Take 1 tablet (100 mg) by mouth once daily in the evening. Take with meals.      amitriptyline (Elavil) 25 mg tablet Take 3 tablets (75 mg) by mouth once daily at bedtime.      amLODIPine (Norvasc) 5 mg tablet Take 1 tablet (5 mg) by mouth once daily in the evening. Take with meals.      ascorbic acid/collagen hydr (COLLAGEN SKIN RENEWAL ORAL) Take 1 Capful by mouth once daily with a meal.      aspirin 81 mg EC tablet Take 1 tablet (81 mg) by mouth once daily at bedtime.      atorvastatin (Lipitor) 10 mg tablet Take 1 tablet (10 mg) by mouth once daily in the evening. Take with meals.      BACILLUS COAGULANS-INULIN ORAL  "Take 1 capsule by mouth once daily with a meal.      BD Luer-Orestes Syringe 1 mL syringe       BD Regular Bevel Needles 18 gauge x 1 1/2\" needle       BD SafetyGlide Syringe 3 mL 23 x 1\" syringe       cephalexin (Keflex) 500 mg capsule Take 1 capsule (500 mg) by mouth 2 times a day for 7 days. 14 capsule 0    cholecalciferol (Vitamin D-3) 50 mcg (2,000 unit) capsule Take 1 capsule (50 mcg) by mouth once daily.      estradiol valerate (Delestrogen) 20 mg/mL injection Inject 1 mL (20 mg) into the muscle 1 (one) time per week. On Thursday @ 8pm      ketorolac (Toradol) 10 mg tablet Take 1 tablet (10 mg) by mouth every 6 hours if needed for moderate pain (4 - 6). 20 tablet 0    lidocaine-prilocaine (Emla) 2.5-2.5 % cream Apply topically.      MAGNESIUM MAL, THREON, CHELATE ORAL Take 144 mg by mouth once daily in the evening. Take with meals.      melatonin 10 mg tablet Take 1 tablet (10 mg) by mouth as needed at bedtime.      MULTIVITAMIN ORAL Take 1 tablet by mouth 2 times a day.      Nurtec ODT 75 mg tablet,disintegrating Take 1 tablet (75 mg) by mouth once daily as needed.      oxyCODONE (Roxicodone) 5 mg immediate release tablet Take 1 tablet (5 mg) by mouth every 6 hours if needed for severe pain (7 - 10). 8 tablet 0    potassium citrate CR (Urocit-K-10) 10 mEq ER tablet Take 1 tablet (10 mEq) by mouth 2 times a day.      progesterone (Prometrium) 200 mg capsule Take 1 capsule (200 mg) by mouth once daily at bedtime.      sodium bicarbonate 650 mg tablet Take 1 tablet (650 mg) by mouth 2 times a day. Before breakfast and at bedtime      Surgilube gel USE ONE GRAM THREE TIMES A DAY FOR VAGINAL DILATION FOR THREE MONTHS  THEN AS INSTRUCTED.      tamsulosin (Flomax) 0.4 mg 24 hr capsule Take 1 capsule (0.4 mg) by mouth once daily in the evening. Take with meals. 30 capsule 0    vitamin B complex (Vitamins B Complex) tablet Take 1 tablet by mouth 2 times a day with meals.      [DISCONTINUED] levoFLOXacin (Levaquin) 500 mg " tablet Take 1 tablet (500 mg) by mouth once daily. (Patient not taking: Reported on 11/22/2023) 14 tablet 0    [DISCONTINUED] methocarbamol (Robaxin) 500 mg tablet Take 1 tablet (500 mg) by mouth every 6 hours for 4 days. 16 tablet 0    [DISCONTINUED] ondansetron ODT (Zofran-ODT) 4 mg disintegrating tablet Take 1 tablet (4 mg) by mouth every 8 hours if needed for nausea or vomiting. (Patient not taking: Reported on 11/22/2023) 30 tablet 0     No current facility-administered medications on file prior to visit.        SOCIAL HISTORY:  Patient  reports that she has never smoked. She has never used smokeless tobacco. She reports that she does not currently use alcohol. She reports that she does not use drugs.   Social History     Socioeconomic History    Marital status: Single     Spouse name: Not on file    Number of children: Not on file    Years of education: Not on file    Highest education level: Not on file   Occupational History    Not on file   Tobacco Use    Smoking status: Never    Smokeless tobacco: Never   Vaping Use    Vaping Use: Never used   Substance and Sexual Activity    Alcohol use: Not Currently    Drug use: Never    Sexual activity: Defer   Other Topics Concern    Not on file   Social History Narrative    Not on file     Social Determinants of Health     Financial Resource Strain: Low Risk  (10/9/2023)    Overall Financial Resource Strain (CARDIA)     Difficulty of Paying Living Expenses: Not hard at all   Food Insecurity: No Food Insecurity (10/9/2023)    Hunger Vital Sign     Worried About Running Out of Food in the Last Year: Never true     Ran Out of Food in the Last Year: Never true   Transportation Needs: No Transportation Needs (10/9/2023)    PRAPARE - Transportation     Lack of Transportation (Medical): No     Lack of Transportation (Non-Medical): No   Physical Activity: Inactive (10/9/2023)    Exercise Vital Sign     Days of Exercise per Week: 0 days     Minutes of Exercise per Session: 0  min   Stress: Not on file   Social Connections: Unknown (10/9/2023)    Social Connection and Isolation Panel [NHANES]     Frequency of Communication with Friends and Family: Not on file     Frequency of Social Gatherings with Friends and Family: Not on file     Attends Islam Services: Not on file     Active Member of Clubs or Organizations: Not on file     Attends Club or Organization Meetings: Not on file     Marital Status:    Intimate Partner Violence: Not At Risk (10/9/2023)    Humiliation, Afraid, Rape, and Kick questionnaire     Fear of Current or Ex-Partner: No     Emotionally Abused: No     Physically Abused: No     Sexually Abused: No   Housing Stability: Low Risk  (10/9/2023)    Housing Stability Vital Sign     Unable to Pay for Housing in the Last Year: No     Number of Places Lived in the Last Year: 0     Unstable Housing in the Last Year: No       FAMILY HISTORY:  Family History   Problem Relation Name Age of Onset    Breast cancer Mother      Dementia Mother      Hypertension Mother      Carpal tunnel syndrome Mother      Osteoarthritis Mother      Diabetes Other      Arthritis Other      Cancer Other      Hypertension Other      Alcohol abuse Other         REVIEW OF SYSTEMS:  All systems reviewed, pertinent negatives as noted in HPI.     PHYSICAL EXAM:  There were no vitals taken for this visit.  Constitutional: Well-developed and well-nourished. No distress.    Head: Normocephalic and atraumatic.    Neck: Normal range of motion.     Pulmonary/Chest: Effort normal. No respiratory distress.   Abdominal: Non-distended.  : See below.  Integumentary: No rash or lesions visualized.  Musculoskeletal: Normal range of motion.    Neurological: Alert and oriented.  Psychiatric: Normal mood and affect. Thought content normal.      LABORATORY REVIEW:   Lab Results   Component Value Date    BUN 17 10/24/2023    CREATININE 1.00 10/24/2023    EGFR 88 10/24/2023     10/24/2023    K 3.7 10/24/2023      (H) 10/24/2023    CO2 21 10/24/2023    CALCIUM 8.2 (L) 10/24/2023      Lab Results   Component Value Date    WBC 8.7 10/24/2023    RBC 3.85 (L) 10/24/2023    HGB 11.2 (L) 10/24/2023    HCT 34.2 (L) 10/24/2023    MCV 89 10/24/2023    MCH 29.1 10/24/2023    MCHC 32.7 10/24/2023    RDW 13.5 10/24/2023     10/24/2023    MPV 9.6 10/24/2023        Lab Results   Component Value Date    PSA <0.10 11/02/2021    PSA 1.03 10/08/2020    PSA 0.40 09/27/2018           Assessment:      1. Nephrolithiasis            Ashley Valencia is a 58 y.o. with nephrolithiasis s/p cysto & ureteroscopy and B stent placement per Dr. Blake 9/26/23. Underwent repeat cysto & ureteroscopy with L laser lithotripsy and L ureteral stent placement.     Stent strings in place with dressing; removed now without difficulty.     Plan:   Continue regular FUV for vaginoplasty  Will need annual monitoring of stone burden  RTC as scheduled  Encouraged to call in the interim with any questions, concerns

## 2023-12-01 DIAGNOSIS — N20.0 NEPHROLITHIASIS: ICD-10-CM

## 2023-12-01 RX ORDER — POTASSIUM CITRATE 10 MEQ/1
10 TABLET, EXTENDED RELEASE ORAL 2 TIMES DAILY
Qty: 30 TABLET | Refills: 11 | Status: SHIPPED | OUTPATIENT
Start: 2023-12-01 | End: 2023-12-28 | Stop reason: SDUPTHER

## 2023-12-01 RX ORDER — TAMSULOSIN HYDROCHLORIDE 0.4 MG/1
0.4 CAPSULE ORAL
Qty: 30 CAPSULE | Refills: 0 | Status: CANCELLED | OUTPATIENT
Start: 2023-12-01 | End: 2023-12-31

## 2023-12-01 RX ORDER — ALLOPURINOL 100 MG/1
100 TABLET ORAL
Qty: 30 TABLET | Refills: 11 | Status: SHIPPED | OUTPATIENT
Start: 2023-12-01

## 2023-12-12 ENCOUNTER — APPOINTMENT (OUTPATIENT)
Dept: UROLOGY | Facility: CLINIC | Age: 58
End: 2023-12-12
Payer: COMMERCIAL

## 2023-12-28 DIAGNOSIS — N20.0 NEPHROLITHIASIS: ICD-10-CM

## 2023-12-30 RX ORDER — POTASSIUM CITRATE 10 MEQ/1
10 TABLET, EXTENDED RELEASE ORAL 2 TIMES DAILY
Qty: 30 TABLET | Refills: 11 | Status: SHIPPED | OUTPATIENT
Start: 2023-12-30 | End: 2024-01-02 | Stop reason: SDUPTHER

## 2024-01-02 DIAGNOSIS — N20.0 NEPHROLITHIASIS: ICD-10-CM

## 2024-01-02 RX ORDER — POTASSIUM CITRATE 10 MEQ/1
10 TABLET, EXTENDED RELEASE ORAL 2 TIMES DAILY
Qty: 30 TABLET | Refills: 11 | Status: SHIPPED | OUTPATIENT
Start: 2024-01-02 | End: 2024-01-04 | Stop reason: SDUPTHER

## 2024-01-04 DIAGNOSIS — N20.0 NEPHROLITHIASIS: ICD-10-CM

## 2024-01-05 RX ORDER — POTASSIUM CITRATE 10 MEQ/1
10 TABLET, EXTENDED RELEASE ORAL 2 TIMES DAILY
Qty: 60 TABLET | Refills: 11 | Status: SHIPPED | OUTPATIENT
Start: 2024-01-05 | End: 2024-02-03 | Stop reason: SDUPTHER

## 2024-01-08 ENCOUNTER — OFFICE VISIT (OUTPATIENT)
Dept: UROLOGY | Facility: CLINIC | Age: 59
End: 2024-01-08
Payer: COMMERCIAL

## 2024-01-08 VITALS
HEIGHT: 72 IN | DIASTOLIC BLOOD PRESSURE: 81 MMHG | WEIGHT: 193 LBS | TEMPERATURE: 96.5 F | SYSTOLIC BLOOD PRESSURE: 125 MMHG | BODY MASS INDEX: 26.14 KG/M2 | HEART RATE: 91 BPM

## 2024-01-08 DIAGNOSIS — F64.9 GENDER DYSPHORIA: Primary | ICD-10-CM

## 2024-01-08 PROCEDURE — 1036F TOBACCO NON-USER: CPT | Performed by: NURSE PRACTITIONER

## 2024-01-08 PROCEDURE — 3074F SYST BP LT 130 MM HG: CPT | Performed by: NURSE PRACTITIONER

## 2024-01-08 PROCEDURE — 3079F DIAST BP 80-89 MM HG: CPT | Performed by: NURSE PRACTITIONER

## 2024-01-08 PROCEDURE — 99024 POSTOP FOLLOW-UP VISIT: CPT | Performed by: NURSE PRACTITIONER

## 2024-01-08 NOTE — PROGRESS NOTES
"GENDER CARE POST-OP     HISTORY OF PRESENT ILLNESS:   Ashley Valencia is a 58 y.o. trans woman s/p PPT vaginoplasty 10/3/23.     Also with nephrolithiasis s/p ureteroscopy and B stent placement per Dr. Blake 9/26/23. Repeat ureteroscopy with L laser lithotripsy and ureteral stent placement; stents removed 11/30/23.     INTERVAL HISTORY:  Returns today for POV  Seen unaccompanied  Dilating once a day due to work obligations  Down to finger notch with blue, green, and large orange dilators  Some discomfort with dilation this morning; thinks it may be related to constipation  Mild arousal, not yet orgasmed; hasn't really tried  Urine stream is high, hits the toilet seat    PAST MEDICAL HISTORY:  Past Medical History:   Diagnosis Date    Calculus of kidney 01/28/2019    Kidney stone on left side    Depression     Fibromyalgia     Gender dysphoria     HLD (hyperlipidemia)     HTN (hypertension)     IBS (irritable bowel syndrome)     Migraines     Personal history of colonic polyps 05/07/2021    Unspecified hydronephrosis 10/08/2020    Hydronephrosis, left       PAST SURGICAL HISTORY:  Past Surgical History:   Procedure Laterality Date    HEMORRHOID SURGERY  12/11/2018    Hemorrhoidectomy    OTHER SURGICAL HISTORY  12/11/2018    Colonoscopy        ALLERGIES:   No Known Allergies     MEDICATIONS:     Current Outpatient Medications:     alcohol swabs pads, medicated, , Disp: , Rfl:     allopurinol (Zyloprim) 100 mg tablet, Take 1 tablet (100 mg) by mouth once daily in the evening. Take with meals., Disp: 30 tablet, Rfl: 11    amitriptyline (Elavil) 25 mg tablet, Take 3 tablets (75 mg) by mouth once daily at bedtime., Disp: , Rfl:     BACILLUS COAGULANS-INULIN ORAL, Take 1 capsule by mouth once daily with a meal., Disp: , Rfl:     BD Luer-Orestes Syringe 1 mL syringe, , Disp: , Rfl:     BD Regular Bevel Needles 18 gauge x 1 1/2\" needle, , Disp: , Rfl:     BD SafetyGlide Syringe 3 mL 23 x 1\" syringe, , Disp: , Rfl:     " cholecalciferol (Vitamin D-3) 50 mcg (2,000 unit) capsule, Take 1 capsule (50 mcg) by mouth once daily., Disp: , Rfl:     estradiol valerate (Delestrogen) 20 mg/mL injection, Inject 1 mL (20 mg) into the muscle 1 (one) time per week. On Thursday @ 8pm, Disp: , Rfl:     lidocaine-prilocaine (Emla) 2.5-2.5 % cream, Apply topically., Disp: , Rfl:     MAGNESIUM MAL, THREON, CHELATE ORAL, Take 144 mg by mouth once daily in the evening. Take with meals., Disp: , Rfl:     melatonin 10 mg tablet, Take 1 tablet (10 mg) by mouth as needed at bedtime., Disp: , Rfl:     MULTIVITAMIN ORAL, Take 1 tablet by mouth 2 times a day., Disp: , Rfl:     Nurtec ODT 75 mg tablet,disintegrating, Take 1 tablet (75 mg) by mouth once daily as needed., Disp: , Rfl:     potassium citrate CR (Urocit-K-10) 10 mEq ER tablet, Take 1 tablet (10 mEq) by mouth 2 times a day., Disp: 60 tablet, Rfl: 11    progesterone (Prometrium) 200 mg capsule, Take 1 capsule (200 mg) by mouth once daily at bedtime., Disp: , Rfl:     sodium bicarbonate 650 mg tablet, Take 1 tablet (650 mg) by mouth 2 times a day. Before breakfast and at bedtime, Disp: , Rfl:     vitamin B complex (Vitamins B Complex) tablet, Take 1 tablet by mouth 2 times a day with meals., Disp: , Rfl:     amLODIPine (Norvasc) 5 mg tablet, Take 1 tablet (5 mg) by mouth once daily in the evening. Take with meals., Disp: , Rfl:     ascorbic acid/collagen hydr (COLLAGEN SKIN RENEWAL ORAL), Take 1 Capful by mouth once daily with a meal., Disp: , Rfl:     aspirin 81 mg EC tablet, Take 1 tablet (81 mg) by mouth once daily at bedtime., Disp: , Rfl:     atorvastatin (Lipitor) 10 mg tablet, Take 1 tablet (10 mg) by mouth once daily in the evening. Take with meals., Disp: , Rfl:     Surgilube gel, USE ONE GRAM THREE TIMES A DAY FOR VAGINAL DILATION FOR THREE MONTHS  THEN AS INSTRUCTED., Disp: , Rfl:      SOCIAL HISTORY:  Patient  reports that she has never smoked. She has never used smokeless tobacco. She  reports that she does not currently use alcohol. She reports that she does not use drugs.   Social History     Socioeconomic History    Marital status: Single     Spouse name: Not on file    Number of children: Not on file    Years of education: Not on file    Highest education level: Not on file   Occupational History    Not on file   Tobacco Use    Smoking status: Never    Smokeless tobacco: Never   Vaping Use    Vaping Use: Never used   Substance and Sexual Activity    Alcohol use: Not Currently    Drug use: Never    Sexual activity: Defer   Other Topics Concern    Not on file   Social History Narrative    Not on file     Social Determinants of Health     Financial Resource Strain: Low Risk  (10/9/2023)    Overall Financial Resource Strain (CARDIA)     Difficulty of Paying Living Expenses: Not hard at all   Food Insecurity: No Food Insecurity (10/9/2023)    Hunger Vital Sign     Worried About Running Out of Food in the Last Year: Never true     Ran Out of Food in the Last Year: Never true   Transportation Needs: No Transportation Needs (10/9/2023)    PRAPARE - Transportation     Lack of Transportation (Medical): No     Lack of Transportation (Non-Medical): No   Physical Activity: Inactive (10/9/2023)    Exercise Vital Sign     Days of Exercise per Week: 0 days     Minutes of Exercise per Session: 0 min   Stress: Not on file   Social Connections: Unknown (10/9/2023)    Social Connection and Isolation Panel [NHANES]     Frequency of Communication with Friends and Family: Not on file     Frequency of Social Gatherings with Friends and Family: Not on file     Attends Uatsdin Services: Not on file     Active Member of Clubs or Organizations: Not on file     Attends Club or Organization Meetings: Not on file     Marital Status:    Intimate Partner Violence: Not At Risk (10/9/2023)    Humiliation, Afraid, Rape, and Kick questionnaire     Fear of Current or Ex-Partner: No     Emotionally Abused: No      Physically Abused: No     Sexually Abused: No   Housing Stability: Low Risk  (10/9/2023)    Housing Stability Vital Sign     Unable to Pay for Housing in the Last Year: No     Number of Places Lived in the Last Year: 0     Unstable Housing in the Last Year: No       FAMILY HISTORY:  Family History   Problem Relation Name Age of Onset    Breast cancer Mother      Dementia Mother      Hypertension Mother      Carpal tunnel syndrome Mother      Osteoarthritis Mother      Diabetes Other      Arthritis Other      Cancer Other      Hypertension Other      Alcohol abuse Other         REVIEW OF SYSTEMS:  All systems reviewed, pertinent negatives as noted in HPI.    PHYSICAL EXAM:  Visit Vitals  /81   Pulse 91   Temp 35.8 °C (96.5 °F)     Constitutional: Well-developed and well-nourished. No distress.    Head: Normocephalic and atraumatic.    Neck: Normal range of motion.    Pulmonary/Chest: Effort normal. No respiratory distress.   Abdominal: Nondistended.  : See below.  Integumentary: No rash or lesions.  Musculoskeletal: Normal range of motion.    Neurological: Alert and oriented.  Psychiatric: Normal mood and affect. Thought content normal.      LABORATORY REVIEW:   Lab Results   Component Value Date    BUN 17 10/24/2023    CREATININE 1.00 10/24/2023    EGFR 88 10/24/2023     10/24/2023    K 3.7 10/24/2023     (H) 10/24/2023    CO2 21 10/24/2023    CALCIUM 8.2 (L) 10/24/2023      Lab Results   Component Value Date    WBC 8.7 10/24/2023    RBC 3.85 (L) 10/24/2023    HGB 11.2 (L) 10/24/2023    HCT 34.2 (L) 10/24/2023    MCV 89 10/24/2023    MCH 29.1 10/24/2023    MCHC 32.7 10/24/2023    RDW 13.5 10/24/2023     10/24/2023    MPV 9.6 10/24/2023               Assessment:     Encounter Diagnosis   Name Primary?    Gender dysphoria Yes       Ashley Valencia is a 58 y.o. trans woman s/p PPT vaginoplasty 10/3/23. Doing very well; dilating once daily, excellent depth. Minimal sexual arousal. Urine  stream is high. Has what appears to be excess tissue over clitoris; deeply hooded.    Plan:   Encouraged to experiment with ways to optimize voiding  Discussed using stimulation to aid in remapping sensation, erogenous and otherwise  If difficulty/discomfort with dilation persists, advised to resume BID dilation  RTC in 6 weeks for reassessment or sooner if needed  Encouraged to call in the interim with any questions, concerns

## 2024-01-24 ENCOUNTER — E-VISIT (OUTPATIENT)
Dept: UROLOGY | Facility: CLINIC | Age: 59
End: 2024-01-24
Payer: COMMERCIAL

## 2024-01-24 DIAGNOSIS — N20.0 NEPHROLITHIASIS: ICD-10-CM

## 2024-01-24 PROCEDURE — 99024 POSTOP FOLLOW-UP VISIT: CPT | Performed by: STUDENT IN AN ORGANIZED HEALTH CARE EDUCATION/TRAINING PROGRAM

## 2024-02-03 RX ORDER — POTASSIUM CITRATE 10 MEQ/1
10 TABLET, EXTENDED RELEASE ORAL 2 TIMES DAILY
Qty: 60 TABLET | Refills: 11 | Status: SHIPPED | OUTPATIENT
Start: 2024-02-03

## 2024-03-04 ENCOUNTER — OFFICE VISIT (OUTPATIENT)
Dept: UROLOGY | Facility: CLINIC | Age: 59
End: 2024-03-04
Payer: COMMERCIAL

## 2024-03-04 VITALS
WEIGHT: 196 LBS | DIASTOLIC BLOOD PRESSURE: 86 MMHG | HEART RATE: 87 BPM | SYSTOLIC BLOOD PRESSURE: 137 MMHG | HEIGHT: 72 IN | BODY MASS INDEX: 26.55 KG/M2

## 2024-03-04 DIAGNOSIS — F52.31 ANORGASMIA OF FEMALE: ICD-10-CM

## 2024-03-04 DIAGNOSIS — R32 URINARY INCONTINENCE, UNSPECIFIED TYPE: Primary | ICD-10-CM

## 2024-03-04 PROCEDURE — 99214 OFFICE O/P EST MOD 30 MIN: CPT | Performed by: NURSE PRACTITIONER

## 2024-03-04 PROCEDURE — 3075F SYST BP GE 130 - 139MM HG: CPT | Performed by: NURSE PRACTITIONER

## 2024-03-04 PROCEDURE — 3079F DIAST BP 80-89 MM HG: CPT | Performed by: NURSE PRACTITIONER

## 2024-03-04 PROCEDURE — 1036F TOBACCO NON-USER: CPT | Performed by: NURSE PRACTITIONER

## 2024-03-04 RX ORDER — FREMANEZUMAB-VFRM 225 MG/1.5ML
225 INJECTION SUBCUTANEOUS
COMMUNITY

## 2024-03-04 NOTE — PROGRESS NOTES
GENDER CARE POST-OP     HISTORY OF PRESENT ILLNESS:   Ashley Valencia is a 58 y.o. trans woman s/p PPT vaginoplasty 10/3/23. Nephrolithiasis s/p ureteroscopy and B stent placement per Dr. Blake 9/26/23. Repeat ureteroscopy with L laser lithotripsy and ureteral stent placement; stents removed 11/30/23.     INTERVAL HISTORY:  Returns today for FUV  Seen unaccompanied  Reports ongoing issues with spraying  Also with persistent small volume urinary leakage  May have had some leakage prior to surgery  Recent episode of incontinence without awareness   Some arousal but unable to reach orgasm  Notes difficulty finding clitoris  Asking about sex therapy  Dilating with 3 largest dilators  Some difficulty getting 1st dilator in    PAST MEDICAL HISTORY:  Past Medical History:   Diagnosis Date    Calculus of kidney 01/28/2019    Kidney stone on left side    Depression     Fibromyalgia     Gender dysphoria     HLD (hyperlipidemia)     HTN (hypertension)     IBS (irritable bowel syndrome)     Migraines     Personal history of colonic polyps 05/07/2021    Unspecified hydronephrosis 10/08/2020    Hydronephrosis, left       PAST SURGICAL HISTORY:  Past Surgical History:   Procedure Laterality Date    HEMORRHOID SURGERY  12/11/2018    Hemorrhoidectomy    OTHER SURGICAL HISTORY  12/11/2018    Colonoscopy        ALLERGIES:   No Known Allergies     MEDICATIONS:     Current Outpatient Medications:     alcohol swabs pads, medicated, , Disp: , Rfl:     allopurinol (Zyloprim) 100 mg tablet, Take 1 tablet (100 mg) by mouth once daily in the evening. Take with meals., Disp: 30 tablet, Rfl: 11    amitriptyline (Elavil) 25 mg tablet, Take 3 tablets (75 mg) by mouth once daily at bedtime., Disp: , Rfl:     amLODIPine (Norvasc) 5 mg tablet, Take 1 tablet (5 mg) by mouth once daily in the evening. Take with meals., Disp: , Rfl:     ascorbic acid/collagen hydr (COLLAGEN SKIN RENEWAL ORAL), Take 1 Capful by mouth once daily with a meal., Disp: ,  "Rfl:     aspirin 81 mg EC tablet, Take 1 tablet (81 mg) by mouth once daily at bedtime., Disp: , Rfl:     atorvastatin (Lipitor) 10 mg tablet, Take 1 tablet (10 mg) by mouth once daily in the evening. Take with meals., Disp: , Rfl:     BACILLUS COAGULANS-INULIN ORAL, Take 1 capsule by mouth once daily with a meal., Disp: , Rfl:     BD Luer-Orestes Syringe 1 mL syringe, , Disp: , Rfl:     BD Regular Bevel Needles 18 gauge x 1 1/2\" needle, , Disp: , Rfl:     BD SafetyGlide Syringe 3 mL 23 x 1\" syringe, , Disp: , Rfl:     cholecalciferol (Vitamin D-3) 50 mcg (2,000 unit) capsule, Take 1 capsule (50 mcg) by mouth once daily., Disp: , Rfl:     estradiol valerate (Delestrogen) 20 mg/mL injection, Inject 1 mL (20 mg) into the muscle 1 (one) time per week. On Thursday @ 8pm, Disp: , Rfl:     fremanezumab (Ajovy Autoinjector) 225 mg/1.5 mL auto-injector, 1 Pen (225 mg) every 28 (twenty-eight) days., Disp: , Rfl:     lidocaine-prilocaine (Emla) 2.5-2.5 % cream, Apply topically., Disp: , Rfl:     MAGNESIUM MAL, THREON, CHELATE ORAL, Take 144 mg by mouth once daily in the evening. Take with meals., Disp: , Rfl:     melatonin 10 mg tablet, Take 1 tablet (10 mg) by mouth as needed at bedtime., Disp: , Rfl:     MULTIVITAMIN ORAL, Take 1 tablet by mouth 2 times a day., Disp: , Rfl:     Nurtec ODT 75 mg tablet,disintegrating, Take 1 tablet (75 mg) by mouth once daily as needed., Disp: , Rfl:     potassium citrate CR (Urocit-K-10) 10 mEq ER tablet, Take 1 tablet (10 mEq) by mouth 2 times a day., Disp: 60 tablet, Rfl: 11    progesterone (Prometrium) 200 mg capsule, Take 1 capsule (200 mg) by mouth once daily at bedtime., Disp: , Rfl:     sodium bicarbonate 650 mg tablet, Take 1 tablet (650 mg) by mouth 2 times a day. Before breakfast and at bedtime, Disp: , Rfl:     Surgilube gel, USE ONE GRAM THREE TIMES A DAY FOR VAGINAL DILATION FOR THREE MONTHS  THEN AS INSTRUCTED., Disp: , Rfl:     vitamin B complex (Vitamins B Complex) tablet, Take " 1 tablet by mouth 2 times a day with meals., Disp: , Rfl:      SOCIAL HISTORY:  Patient  reports that she has never smoked. She has never used smokeless tobacco. She reports that she does not currently use alcohol. She reports that she does not use drugs.   Social History     Socioeconomic History    Marital status: Single     Spouse name: Not on file    Number of children: Not on file    Years of education: Not on file    Highest education level: Not on file   Occupational History    Not on file   Tobacco Use    Smoking status: Never    Smokeless tobacco: Never   Vaping Use    Vaping Use: Never used   Substance and Sexual Activity    Alcohol use: Not Currently    Drug use: Never    Sexual activity: Defer   Other Topics Concern    Not on file   Social History Narrative    Not on file     Social Determinants of Health     Financial Resource Strain: Low Risk  (10/9/2023)    Overall Financial Resource Strain (CARDIA)     Difficulty of Paying Living Expenses: Not hard at all   Food Insecurity: No Food Insecurity (10/9/2023)    Hunger Vital Sign     Worried About Running Out of Food in the Last Year: Never true     Ran Out of Food in the Last Year: Never true   Transportation Needs: No Transportation Needs (10/9/2023)    PRAPARE - Transportation     Lack of Transportation (Medical): No     Lack of Transportation (Non-Medical): No   Physical Activity: Inactive (10/9/2023)    Exercise Vital Sign     Days of Exercise per Week: 0 days     Minutes of Exercise per Session: 0 min   Stress: Not on file   Social Connections: Unknown (10/9/2023)    Social Connection and Isolation Panel [NHANES]     Frequency of Communication with Friends and Family: Not on file     Frequency of Social Gatherings with Friends and Family: Not on file     Attends Oriental orthodox Services: Not on file     Active Member of Clubs or Organizations: Not on file     Attends Club or Organization Meetings: Not on file     Marital Status:    Intimate Partner  Violence: Not At Risk (10/9/2023)    Humiliation, Afraid, Rape, and Kick questionnaire     Fear of Current or Ex-Partner: No     Emotionally Abused: No     Physically Abused: No     Sexually Abused: No   Housing Stability: Low Risk  (10/9/2023)    Housing Stability Vital Sign     Unable to Pay for Housing in the Last Year: No     Number of Places Lived in the Last Year: 0     Unstable Housing in the Last Year: No       FAMILY HISTORY:  Family History   Problem Relation Name Age of Onset    Breast cancer Mother      Dementia Mother      Hypertension Mother      Carpal tunnel syndrome Mother      Osteoarthritis Mother      Diabetes Other      Arthritis Other      Cancer Other      Hypertension Other      Alcohol abuse Other         REVIEW OF SYSTEMS:  All systems reviewed, pertinent negatives as noted in HPI.    PHYSICAL EXAM:  Visit Vitals  /86   Pulse 87     Constitutional: Well-developed and well-nourished. No distress.    Head: Normocephalic and atraumatic.    Neck: Normal range of motion.    Pulmonary/Chest: Effort normal. No respiratory distress.   Abdominal: Nondistended.  : See below.  Integumentary: No rash or lesions.  Musculoskeletal: Normal range of motion.    Neurological: Alert and oriented.  Psychiatric: Normal mood and affect. Thought content normal.      LABORATORY REVIEW:   Lab Results   Component Value Date    BUN 17 10/24/2023    CREATININE 1.00 10/24/2023    EGFR 88 10/24/2023     10/24/2023    K 3.7 10/24/2023     (H) 10/24/2023    CO2 21 10/24/2023    CALCIUM 8.2 (L) 10/24/2023      Lab Results   Component Value Date    WBC 8.7 10/24/2023    RBC 3.85 (L) 10/24/2023    HGB 11.2 (L) 10/24/2023    HCT 34.2 (L) 10/24/2023    MCV 89 10/24/2023    MCH 29.1 10/24/2023    MCHC 32.7 10/24/2023    RDW 13.5 10/24/2023     10/24/2023    MPV 9.6 10/24/2023               Assessment:     Encounter Diagnoses   Name Primary?    Urinary incontinence, unspecified type Yes    Anorgasmia  of female        Ashley KEV Annie is a 58 y.o. trans woman s/p PPT vaginoplasty 10/3/23. Doing very well, good width and depth with dilation. Some urinary issues, including spraying, leakage, and recent incontinence. Unable to orgasm, difficulty finding clitoris.    Plan:   Discussed possible stress incontinence vs OAB; plan for UDS  Recommend working towards exploring erogenous sensation before attempting orgasm  May proceed with revision of clitoral farmer if still needed  RTC for cysto after UDS  Encouraged to call in the interim with any questions, concerns

## 2024-03-15 ENCOUNTER — APPOINTMENT (OUTPATIENT)
Dept: UROLOGY | Facility: CLINIC | Age: 59
End: 2024-03-15
Payer: COMMERCIAL

## 2024-03-18 ENCOUNTER — PROCEDURE VISIT (OUTPATIENT)
Dept: UROLOGY | Facility: CLINIC | Age: 59
End: 2024-03-18
Payer: COMMERCIAL

## 2024-03-18 VITALS
HEIGHT: 72 IN | DIASTOLIC BLOOD PRESSURE: 88 MMHG | HEART RATE: 96 BPM | WEIGHT: 196 LBS | BODY MASS INDEX: 26.55 KG/M2 | SYSTOLIC BLOOD PRESSURE: 145 MMHG

## 2024-03-18 DIAGNOSIS — N32.81 OAB (OVERACTIVE BLADDER): Primary | ICD-10-CM

## 2024-03-18 DIAGNOSIS — R32 URINARY INCONTINENCE, UNSPECIFIED TYPE: ICD-10-CM

## 2024-03-18 PROCEDURE — 52000 CYSTOURETHROSCOPY: CPT | Performed by: UROLOGY

## 2024-03-18 PROCEDURE — 51741 ELECTRO-UROFLOWMETRY FIRST: CPT | Performed by: UROLOGY

## 2024-03-18 PROCEDURE — 51797 INTRAABDOMINAL PRESSURE TEST: CPT | Performed by: UROLOGY

## 2024-03-18 PROCEDURE — 51784 ANAL/URINARY MUSCLE STUDY: CPT | Performed by: UROLOGY

## 2024-03-18 PROCEDURE — 51728 CYSTOMETROGRAM W/VP: CPT | Performed by: UROLOGY

## 2024-03-18 RX ORDER — SOLIFENACIN SUCCINATE 5 MG/1
5 TABLET, FILM COATED ORAL DAILY
Qty: 30 TABLET | Refills: 5 | Status: SHIPPED | OUTPATIENT
Start: 2024-03-18 | End: 2024-05-22

## 2024-03-18 RX ORDER — AMLODIPINE BESYLATE AND ATORVASTATIN CALCIUM 10; 20 MG/1; MG/1
1 TABLET, FILM COATED ORAL DAILY
COMMUNITY

## 2024-03-18 NOTE — PROGRESS NOTES
Subjective   Patient ID: Ashley Valencia is a 58 y.o. adult who presents for Cystoscopy. Patient is s/p PPT vaginoplasty 10/3/23. Nephrolithiasis s/p ureteroscopy and B stent placement per Dr. Blake 9/26/23. Repeat ureteroscopy with L laser lithotripsy and ureteral stent placement; stents removed 11/30/23.     HPI  Patient was not started on any anticholinergic.   Patient relates that when she leaks without noticing, it is often when she has her legs crossed. Dilation is going well for the most part.     She voided last about noon. She takes a multivitamin. Patient notes she is already on a medication that causes dry mouth.     Review of Systems  A 12 system review was completed and is negative with the exception of those signs and symptoms noted in the history of present illness.    Objective   Physical Exam  Constitutional: Patient appears well-developed and well-nourished. No acute distress.     Pulmonary/Chest: Effort normal. No respiratory distress.   Abdominal: Normal.  : normal external genitalia, normal urethral meatus, normal urethra, bladder nonpalpable normal perineum, no prolapse   Integumentary: No rash or lesions.  Psychiatric: Normal mood and affect. Behavior is normal. Thought content normal.      Procedure  Cystoscopy for OAB/stress incontinence.  ?Patient's genitalia were prepped and draped in usual sterile fashion. A 17 Arabic flexible cystoscope was passed atraumatically per the urethra and the bladder was inspected. Bladder was distended at entry. No tumors, clots, stones, or foreign bodies were identified. The right and left ureteral orifice were in their normal anatomic position and effluxing clear urine. The scope was retroflexed and the bladder neck was unremarkable. The patient tolerated the procedure well. There is some greenish discoloration of the urine secondary to multivitamin.     Assessment/Plan     UDS interpreted by me.  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  Large-capacity bladder. No Detrusor overactivity. Good urine flow on Uroflow. There is no evidence of OAB. Explained findings to patient. Suggested timed voiding every 3 hours. Start Vesicare 5 mg daily. Discussed potential side effects of dry mouth and constipation. Suggested she use daily MiraLax or similar. Follow-up in 2 months for symptom check.      Scribe Attestation  By signing my name below, I, Diandra Michael   attest that this documentation has been prepared under the direction and in the presence of Benjamin Lock MD.    Kimo Pereyra 03/18/24 3:09 PM

## 2024-03-18 NOTE — PROGRESS NOTES
Ashley Valencia 58 y.o. Gender-Diverse    Procedures:  Patient referred by Dr. Lock for urodynamics to evaluate urinary incontinence. Dr. More was present in office at time of study. Pre-uroflow was completed today with pvr of 125 ml. Urine was clear for uti today. Patient did leak a drop on CLPP. Pvr after study was 237 ml.  Patient instructed to increase fluids if she has any burning or blood in urine. Patient made aware she may have blood rectally due to abdominal catheter insertion.  Patient understood and consented to urodynamics. Patient will follow up with Dr. Lock for cystoscopy and to review results. 03/18/2024/boyd

## 2024-05-22 ENCOUNTER — OFFICE VISIT (OUTPATIENT)
Dept: UROLOGY | Facility: CLINIC | Age: 59
End: 2024-05-22
Payer: COMMERCIAL

## 2024-05-22 VITALS
DIASTOLIC BLOOD PRESSURE: 93 MMHG | HEART RATE: 68 BPM | BODY MASS INDEX: 26.55 KG/M2 | SYSTOLIC BLOOD PRESSURE: 155 MMHG | WEIGHT: 196 LBS | HEIGHT: 72 IN

## 2024-05-22 DIAGNOSIS — F52.31 ANORGASMIA OF FEMALE: Primary | ICD-10-CM

## 2024-05-22 PROCEDURE — 3077F SYST BP >= 140 MM HG: CPT | Performed by: NURSE PRACTITIONER

## 2024-05-22 PROCEDURE — 3080F DIAST BP >= 90 MM HG: CPT | Performed by: NURSE PRACTITIONER

## 2024-05-22 PROCEDURE — 99214 OFFICE O/P EST MOD 30 MIN: CPT | Performed by: NURSE PRACTITIONER

## 2024-05-22 RX ORDER — TADALAFIL 5 MG/1
5 TABLET ORAL DAILY PRN
Qty: 10 TABLET | Refills: 0 | Status: SHIPPED | OUTPATIENT
Start: 2024-05-22 | End: 2024-06-21

## 2024-05-22 NOTE — PROGRESS NOTES
GENDER CARE POST-OP     HISTORY OF PRESENT ILLNESS:   Ashley Valencia is a 58 y.o. trans woman s/p PPT vaginoplasty 10/3/23  Nephrolithiasis s/p ureteroscopy and B stent placement per Dr. Blake 9/26/23  Repeat ureteroscopy with L laser lithotripsy and ureteral stent placement; stents removed 11/30/23    INTERVAL HISTORY:  Returns today for [facility]  Seen unaccompanied  Notes occasional yellow on pad  Concerned about potential recurrence of large-volume leakage  Constipation on solifenacin, less depth with dilation; stopped  Has not attempted timed voiding or scheduled UDS, didn't feel it pertained to her issues  May have several small orgasms, not certain  Occasional arousal unrelated to direct stimulation  Feels that excess skin is affecting sensitivity  Therapist does sex therapy, sent her links    PAST MEDICAL HISTORY:  Past Medical History:   Diagnosis Date    Calculus of kidney 01/28/2019    Kidney stone on left side    Depression     Fibromyalgia     Gender dysphoria     HLD (hyperlipidemia)     HTN (hypertension)     IBS (irritable bowel syndrome)     Migraines     Personal history of colonic polyps 05/07/2021    Unspecified hydronephrosis 10/08/2020    Hydronephrosis, left       PAST SURGICAL HISTORY:  Past Surgical History:   Procedure Laterality Date    HEMORRHOID SURGERY  12/11/2018    Hemorrhoidectomy    OTHER SURGICAL HISTORY  12/11/2018    Colonoscopy        ALLERGIES:   No Known Allergies     MEDICATIONS:     Current Outpatient Medications:     alcohol swabs pads, medicated, , Disp: , Rfl:     allopurinol (Zyloprim) 100 mg tablet, Take 1 tablet (100 mg) by mouth once daily in the evening. Take with meals., Disp: 30 tablet, Rfl: 11    amitriptyline (Elavil) 25 mg tablet, Take 3 tablets (75 mg) by mouth once daily at bedtime., Disp: , Rfl:     amlodipine-atorvastatin (Caduet) 10-20 mg tablet, Take 1 tablet by mouth once daily., Disp: , Rfl:     ascorbic acid/collagen hydr (COLLAGEN SKIN RENEWAL ORAL),  "Take 1 Capful by mouth once daily with a meal., Disp: , Rfl:     aspirin 81 mg EC tablet, Take 1 tablet (81 mg) by mouth once daily at bedtime., Disp: , Rfl:     BACILLUS COAGULANS-INULIN ORAL, Take 1 capsule by mouth once daily with a meal., Disp: , Rfl:     BD Luer-Orestes Syringe 1 mL syringe, , Disp: , Rfl:     BD Regular Bevel Needles 18 gauge x 1 1/2\" needle, , Disp: , Rfl:     BD SafetyGlide Syringe 3 mL 23 x 1\" syringe, , Disp: , Rfl:     cholecalciferol (Vitamin D-3) 50 mcg (2,000 unit) capsule, Take 1 capsule (50 mcg) by mouth once daily., Disp: , Rfl:     estradiol valerate (Delestrogen) 20 mg/mL injection, Inject 1 mL (20 mg) into the muscle 1 (one) time per week. On Thursday @ 8pm, Disp: , Rfl:     lidocaine-prilocaine (Emla) 2.5-2.5 % cream, Apply topically., Disp: , Rfl:     MAGNESIUM MAL, THREON, CHELATE ORAL, Take 144 mg by mouth once daily in the evening. Take with meals., Disp: , Rfl:     melatonin 10 mg tablet, Take 1 tablet (10 mg) by mouth as needed at bedtime., Disp: , Rfl:     MULTIVITAMIN ORAL, Take 1 tablet by mouth 2 times a day., Disp: , Rfl:     Nurtec ODT 75 mg tablet,disintegrating, Take 1 tablet (75 mg) by mouth once daily as needed., Disp: , Rfl:     potassium citrate CR (Urocit-K-10) 10 mEq ER tablet, Take 1 tablet (10 mEq) by mouth 2 times a day., Disp: 60 tablet, Rfl: 11    progesterone (Prometrium) 200 mg capsule, Take 1 capsule (200 mg) by mouth once daily at bedtime., Disp: , Rfl:     sodium bicarbonate 650 mg tablet, Take 1 tablet (650 mg) by mouth 2 times a day. Before breakfast and at bedtime, Disp: , Rfl:     solifenacin (VESIcare) 5 mg tablet, Take 1 tablet (5 mg) by mouth once daily. Swallow tablet whole; do not crush, chew, or split., Disp: 30 tablet, Rfl: 5    Surgilube gel, USE ONE GRAM THREE TIMES A DAY FOR VAGINAL DILATION FOR THREE MONTHS  THEN AS INSTRUCTED., Disp: , Rfl:     vitamin B complex (Vitamins B Complex) tablet, Take 1 tablet by mouth 2 times daily (morning " and late afternoon)., Disp: , Rfl:     fremanezumab (Ajovy Autoinjector) 225 mg/1.5 mL auto-injector, 1 Pen (225 mg) every 28 (twenty-eight) days., Disp: , Rfl:      SOCIAL HISTORY:  Patient  reports that she has never smoked. She has never used smokeless tobacco. She reports that she does not currently use alcohol. She reports that she does not use drugs.   Social History     Socioeconomic History    Marital status: Single     Spouse name: Not on file    Number of children: Not on file    Years of education: Not on file    Highest education level: Not on file   Occupational History    Not on file   Tobacco Use    Smoking status: Never    Smokeless tobacco: Never   Vaping Use    Vaping status: Never Used   Substance and Sexual Activity    Alcohol use: Not Currently    Drug use: Never    Sexual activity: Defer   Other Topics Concern    Not on file   Social History Narrative    Not on file     Social Determinants of Health     Financial Resource Strain: Low Risk  (1/10/2024)    Received from [facility]    Overall Financial Resource Strain (CARDIA)     Difficulty of Paying Living Expenses: Not hard at all   Food Insecurity: No Food Insecurity (1/10/2024)    Received from [facility]    Hunger Vital Sign     Worried About Running Out of Food in the Last Year: Never true     Ran Out of Food in the Last Year: Never true   Transportation Needs: No Transportation Needs (1/10/2024)    Received from [facility]    PRAPARE - Transportation     Lack of Transportation (Medical): No     Lack of Transportation (Non-Medical): No   Physical Activity: Inactive (1/10/2024)    Received from [facility]    Exercise Vital Sign     Days of Exercise per Week: 0 days     Minutes of Exercise per Session: 0 min   Stress: No Stress Concern Present (1/10/2024)    Received from [facility]    [employer] [place] [employer] - Occupational Stress Questionnaire     Feeling of Stress : Not at all   Social Connections: Moderately Isolated  (1/10/2024)    Received from [facility]    Social Connection and Isolation Panel [NHANES]     Frequency of Communication with Friends and Family: More than three times a week     Frequency of Social Gatherings with Friends and Family: More than three times a week     Attends Anglican Services: Never     Active Member of Clubs or Organizations: Yes     Attends Club or Organization Meetings: More than 4 times per year     Marital Status: Never    Intimate Partner Violence: Not At Risk (1/10/2024)    Received from [facility]    Humiliation, Afraid, Rape, and Kick questionnaire     Fear of Current or Ex-Partner: No     Emotionally Abused: No     Physically Abused: No     Sexually Abused: No   Housing Stability: Low Risk  (1/10/2024)    Received from [facility]    Housing Stability Vital Sign     Unable to Pay for Housing in the Last Year: No     Number of Places Lived in the Last Year: 1     Unstable Housing in the Last Year: No       FAMILY HISTORY:  Family History   Problem Relation Name Age of Onset    Breast cancer Mother      Dementia Mother      Hypertension Mother      Carpal tunnel syndrome Mother      Osteoarthritis Mother      Diabetes Other      Arthritis Other      Cancer Other      Hypertension Other      Alcohol abuse Other         REVIEW OF SYSTEMS:  All systems reviewed, pertinent negatives as noted in HPI.    PHYSICAL EXAM:  Visit Vitals  BP (!) 155/93   Pulse 68     Constitutional: Well-developed and well-nourished. No distress.    Head: Normocephalic and atraumatic.    Neck: Normal range of motion.    Pulmonary/Chest: Effort normal. No respiratory distress.   Abdominal: Nondistended.  : See below.  Integumentary: No rash or lesions.  Musculoskeletal: Normal range of motion.    Neurological: Alert and oriented.  Psychiatric: Normal mood and affect. Thought content normal.          LABORATORY REVIEW:   Lab Results   Component Value Date    BUN 17 10/24/2023    CREATININE 1.00 10/24/2023     EGFR 88 10/24/2023     10/24/2023    K 3.7 10/24/2023     (H) 10/24/2023    CO2 21 10/24/2023    CALCIUM 8.2 (L) 10/24/2023      Lab Results   Component Value Date    WBC 8.7 10/24/2023    RBC 3.85 (L) 10/24/2023    HGB 11.2 (L) 10/24/2023    HCT 34.2 (L) 10/24/2023    MCV 89 10/24/2023    MCH 29.1 10/24/2023    MCHC 32.7 10/24/2023    RDW 13.5 10/24/2023     10/24/2023    MPV 9.6 10/24/2023               Assessment:     No diagnosis found.      Ashley Valencia is a 58 y.o. trans woman s/p PPT vaginoplasty 10/3/23  Good width and depth with dilation; suboptimal sexual function possibly related to excess tissue over clitoris  Ongoing urinary issues, including spraying, leakage; constipation on solifenacin, has not yet tried timed voiding  Not yet scheduled for UDS    Extensive discussion of possible overflow incontinence, role of timed voiding to minimize leakage  Also discussed option to pursue revision labiaplasty     Plan:   Trial of timed voiding  Interventions for sexual function as per therapist  Will coordinate with team to schedule for revision labiaplasty  Encouraged to call in the interim with any questions, concerns

## 2024-05-29 ENCOUNTER — APPOINTMENT (OUTPATIENT)
Dept: UROLOGY | Facility: CLINIC | Age: 59
End: 2024-05-29
Payer: COMMERCIAL

## 2024-06-14 DIAGNOSIS — N94.89 LABIAL PAIN: ICD-10-CM

## 2024-06-14 RX ORDER — CELECOXIB 400 MG/1
400 CAPSULE ORAL ONCE
OUTPATIENT
Start: 2024-06-14 | End: 2024-06-14

## 2024-06-14 RX ORDER — ACETAMINOPHEN 325 MG/1
975 TABLET ORAL ONCE
OUTPATIENT
Start: 2024-06-14 | End: 2024-06-14

## 2024-06-14 RX ORDER — SODIUM CHLORIDE 9 MG/ML
100 INJECTION, SOLUTION INTRAVENOUS CONTINUOUS
OUTPATIENT
Start: 2024-06-14

## 2024-06-16 DIAGNOSIS — F64.9 GENDER DYSPHORIA: Primary | ICD-10-CM

## 2024-06-21 RX ORDER — SURGICAL LUBRICANT
JELLY (GRAM) TOPICAL
Qty: 240.98 G | Refills: 11 | Status: SHIPPED | OUTPATIENT
Start: 2024-06-21

## 2024-06-26 ENCOUNTER — ANESTHESIA EVENT (OUTPATIENT)
Dept: OPERATING ROOM | Facility: HOSPITAL | Age: 59
End: 2024-06-26
Payer: COMMERCIAL

## 2024-06-26 RX ORDER — VERAPAMIL HYDROCHLORIDE 180 MG/1
180 TABLET, FILM COATED, EXTENDED RELEASE ORAL NIGHTLY
COMMUNITY

## 2024-06-26 RX ORDER — LOSARTAN POTASSIUM 50 MG/1
100 TABLET ORAL DAILY
COMMUNITY

## 2024-06-26 NOTE — PREPROCEDURE INSTRUCTIONS
"Current Medications   Medication Instructions    alcohol swabs pads, medicated .    allopurinol (Zyloprim) 100 mg tablet Continue until night before surgery    amitriptyline (Elavil) 25 mg tablet Continue until night before surgery    ascorbic acid/collagen hydr (COLLAGEN SKIN RENEWAL ORAL) Stop 1 day before surgery    BACILLUS COAGULANS-INULIN ORAL Stop 1 day before surgery    BD Luer-Orestes Syringe 1 mL syringe .    BD Regular Bevel Needles 18 gauge x 1 1/2\" needle .    BD SafetyGlide Syringe 3 mL 23 x 1\" syringe .    cholecalciferol (Vitamin D-3) 50 mcg (2,000 unit) capsule Stop 1 day before surgery    estradiol valerate (Delestrogen) 20 mg/mL injection Takes on Thursdays    losartan (Cozaar) 50 mg tablet Stop 1 day before surgery    MAGNESIUM MAL, THREON, CHELATE ORAL Stop 1 day before surgery    melatonin 10 mg tablet Continue until night before surgery    MULTIVITAMIN ORAL Stop 1 day before surgery    Nurtec ODT 75 mg tablet,disintegrating Use as needed    potassium citrate CR (Urocit-K-10) 10 mEq ER tablet Stop 1 day before surgery    progesterone (Prometrium) 200 mg capsule Continue until night before surgery    sodium bicarbonate 650 mg tablet Stop 1 day before surgery    surgical lubricant (Surgilube) gel .    verapamil SR (Calan-SR) 180 mg ER tablet Continue until night before surgery    vitamin B complex (Vitamins B Complex) tablet Stop 1 day before surgery            NPO Instructions Nothing to eat after midnight tonight    Additional Instructions:   Enter through main entrance of [facility], located at [address] Proceed to registration, located in the back right hand corner. You will need your ID and insurance card for registration. Please ensure you have a responsible adult to drive you home.     Take a shower the morning of or night before your procedure. After you shower avoid lotions, powders, deodorants or anything applied to the skin. If you wear contacts or glasses, wear the " glasses. If you do not have glasses, please bring a case for your contacts. You may wear hearing aids and dentures, bring a case for them or we will provide one. Make sure you wear something loose and comfortable. Keep in mind your surgery type and wear something that will accommodate incisions or bandages. Please remove all jewelry.     You may have up to 13.5 ounces of clear liquids 2 hours before*  your instructed ARRIVAL time (6:00)*to the hospital. You may take medications discussed during phone call with a small sip of water.    For further questions Sabiha SINGH can be contacted at [phone] between 7AM-3PM.

## 2024-06-27 ENCOUNTER — ANESTHESIA (OUTPATIENT)
Dept: OPERATING ROOM | Facility: HOSPITAL | Age: 59
End: 2024-06-27
Payer: COMMERCIAL

## 2024-06-27 ENCOUNTER — HOSPITAL ENCOUNTER (OUTPATIENT)
Facility: HOSPITAL | Age: 59
Setting detail: OUTPATIENT SURGERY
Discharge: HOME | End: 2024-06-27
Attending: UROLOGY | Admitting: UROLOGY
Payer: COMMERCIAL

## 2024-06-27 VITALS
BODY MASS INDEX: 26.58 KG/M2 | DIASTOLIC BLOOD PRESSURE: 88 MMHG | OXYGEN SATURATION: 99 % | HEART RATE: 83 BPM | SYSTOLIC BLOOD PRESSURE: 138 MMHG | TEMPERATURE: 97.2 F | RESPIRATION RATE: 16 BRPM | WEIGHT: 195.99 LBS

## 2024-06-27 DIAGNOSIS — N94.89 LABIAL PAIN: Primary | ICD-10-CM

## 2024-06-27 PROCEDURE — 7100000002 HC RECOVERY ROOM TIME - EACH INCREMENTAL 1 MINUTE: Performed by: UROLOGY

## 2024-06-27 PROCEDURE — 2500000005 HC RX 250 GENERAL PHARMACY W/O HCPCS: Performed by: STUDENT IN AN ORGANIZED HEALTH CARE EDUCATION/TRAINING PROGRAM

## 2024-06-27 PROCEDURE — 2500000001 HC RX 250 WO HCPCS SELF ADMINISTERED DRUGS (ALT 637 FOR MEDICARE OP): Performed by: STUDENT IN AN ORGANIZED HEALTH CARE EDUCATION/TRAINING PROGRAM

## 2024-06-27 PROCEDURE — 2500000004 HC RX 250 GENERAL PHARMACY W/ HCPCS (ALT 636 FOR OP/ED): Mod: JZ | Performed by: STUDENT IN AN ORGANIZED HEALTH CARE EDUCATION/TRAINING PROGRAM

## 2024-06-27 PROCEDURE — 3700000001 HC GENERAL ANESTHESIA TIME - INITIAL BASE CHARGE: Performed by: UROLOGY

## 2024-06-27 PROCEDURE — 3600000003 HC OR TIME - INITIAL BASE CHARGE - PROCEDURE LEVEL THREE: Performed by: UROLOGY

## 2024-06-27 PROCEDURE — 7100000009 HC PHASE TWO TIME - INITIAL BASE CHARGE: Performed by: UROLOGY

## 2024-06-27 PROCEDURE — 2720000007 HC OR 272 NO HCPCS: Performed by: UROLOGY

## 2024-06-27 PROCEDURE — 2500000004 HC RX 250 GENERAL PHARMACY W/ HCPCS (ALT 636 FOR OP/ED): Performed by: UROLOGY

## 2024-06-27 PROCEDURE — 3700000002 HC GENERAL ANESTHESIA TIME - EACH INCREMENTAL 1 MINUTE: Performed by: UROLOGY

## 2024-06-27 PROCEDURE — 54001 SLITTING OF PREPUCE: CPT | Performed by: UROLOGY

## 2024-06-27 PROCEDURE — 3600000008 HC OR TIME - EACH INCREMENTAL 1 MINUTE - PROCEDURE LEVEL THREE: Performed by: UROLOGY

## 2024-06-27 PROCEDURE — 2500000001 HC RX 250 WO HCPCS SELF ADMINISTERED DRUGS (ALT 637 FOR MEDICARE OP): Performed by: UROLOGY

## 2024-06-27 PROCEDURE — 2500000004 HC RX 250 GENERAL PHARMACY W/ HCPCS (ALT 636 FOR OP/ED): Performed by: STUDENT IN AN ORGANIZED HEALTH CARE EDUCATION/TRAINING PROGRAM

## 2024-06-27 PROCEDURE — 2500000004 HC RX 250 GENERAL PHARMACY W/ HCPCS (ALT 636 FOR OP/ED): Performed by: ANESTHESIOLOGY

## 2024-06-27 PROCEDURE — 53450 REVISION OF URETHRA: CPT | Performed by: UROLOGY

## 2024-06-27 PROCEDURE — 7100000010 HC PHASE TWO TIME - EACH INCREMENTAL 1 MINUTE: Performed by: UROLOGY

## 2024-06-27 PROCEDURE — 2500000005 HC RX 250 GENERAL PHARMACY W/O HCPCS: Performed by: ANESTHESIOLOGY

## 2024-06-27 PROCEDURE — 7100000001 HC RECOVERY ROOM TIME - INITIAL BASE CHARGE: Performed by: UROLOGY

## 2024-06-27 RX ORDER — HYDRALAZINE HYDROCHLORIDE 20 MG/ML
5 INJECTION INTRAMUSCULAR; INTRAVENOUS EVERY 30 MIN PRN
Status: DISCONTINUED | OUTPATIENT
Start: 2024-06-27 | End: 2024-06-27 | Stop reason: HOSPADM

## 2024-06-27 RX ORDER — MEPERIDINE HYDROCHLORIDE 25 MG/ML
12.5 INJECTION INTRAMUSCULAR; INTRAVENOUS; SUBCUTANEOUS EVERY 10 MIN PRN
Status: DISCONTINUED | OUTPATIENT
Start: 2024-06-27 | End: 2024-06-27 | Stop reason: HOSPADM

## 2024-06-27 RX ORDER — DIPHENHYDRAMINE HYDROCHLORIDE 50 MG/ML
12.5 INJECTION INTRAMUSCULAR; INTRAVENOUS ONCE AS NEEDED
Status: DISCONTINUED | OUTPATIENT
Start: 2024-06-27 | End: 2024-06-27 | Stop reason: HOSPADM

## 2024-06-27 RX ORDER — ONDANSETRON HYDROCHLORIDE 2 MG/ML
4 INJECTION, SOLUTION INTRAVENOUS ONCE AS NEEDED
Status: DISCONTINUED | OUTPATIENT
Start: 2024-06-27 | End: 2024-06-27 | Stop reason: HOSPADM

## 2024-06-27 RX ORDER — METRONIDAZOLE 500 MG/100ML
500 INJECTION, SOLUTION INTRAVENOUS ONCE
Status: COMPLETED | OUTPATIENT
Start: 2024-06-27 | End: 2024-06-27

## 2024-06-27 RX ORDER — LIDOCAINE HYDROCHLORIDE 20 MG/ML
INJECTION, SOLUTION INFILTRATION; PERINEURAL AS NEEDED
Status: DISCONTINUED | OUTPATIENT
Start: 2024-06-27 | End: 2024-06-27

## 2024-06-27 RX ORDER — CEFAZOLIN 1 G/1
INJECTION, POWDER, FOR SOLUTION INTRAVENOUS AS NEEDED
Status: DISCONTINUED | OUTPATIENT
Start: 2024-06-27 | End: 2024-06-27

## 2024-06-27 RX ORDER — ACETAMINOPHEN 325 MG/1
650 TABLET ORAL EVERY 4 HOURS PRN
Status: DISCONTINUED | OUTPATIENT
Start: 2024-06-27 | End: 2024-06-27 | Stop reason: HOSPADM

## 2024-06-27 RX ORDER — ACETAMINOPHEN 325 MG/1
975 TABLET ORAL ONCE
Status: COMPLETED | OUTPATIENT
Start: 2024-06-27 | End: 2024-06-27

## 2024-06-27 RX ORDER — PROPOFOL 10 MG/ML
INJECTION, EMULSION INTRAVENOUS AS NEEDED
Status: DISCONTINUED | OUTPATIENT
Start: 2024-06-27 | End: 2024-06-27

## 2024-06-27 RX ORDER — CELECOXIB 100 MG/1
400 CAPSULE ORAL ONCE
Status: COMPLETED | OUTPATIENT
Start: 2024-06-27 | End: 2024-06-27

## 2024-06-27 RX ORDER — FENTANYL CITRATE 50 UG/ML
INJECTION, SOLUTION INTRAMUSCULAR; INTRAVENOUS AS NEEDED
Status: DISCONTINUED | OUTPATIENT
Start: 2024-06-27 | End: 2024-06-27

## 2024-06-27 RX ORDER — MIDAZOLAM HYDROCHLORIDE 1 MG/ML
1 INJECTION, SOLUTION INTRAMUSCULAR; INTRAVENOUS ONCE AS NEEDED
Status: DISCONTINUED | OUTPATIENT
Start: 2024-06-27 | End: 2024-06-27 | Stop reason: HOSPADM

## 2024-06-27 RX ORDER — ONDANSETRON HYDROCHLORIDE 2 MG/ML
INJECTION, SOLUTION INTRAVENOUS AS NEEDED
Status: DISCONTINUED | OUTPATIENT
Start: 2024-06-27 | End: 2024-06-27

## 2024-06-27 RX ORDER — SODIUM CHLORIDE, SODIUM LACTATE, POTASSIUM CHLORIDE, CALCIUM CHLORIDE 600; 310; 30; 20 MG/100ML; MG/100ML; MG/100ML; MG/100ML
100 INJECTION, SOLUTION INTRAVENOUS CONTINUOUS
Status: DISCONTINUED | OUTPATIENT
Start: 2024-06-27 | End: 2024-06-27 | Stop reason: HOSPADM

## 2024-06-27 RX ORDER — LIDOCAINE HYDROCHLORIDE AND EPINEPHRINE 10; 10 MG/ML; UG/ML
INJECTION, SOLUTION INFILTRATION; PERINEURAL AS NEEDED
Status: DISCONTINUED | OUTPATIENT
Start: 2024-06-27 | End: 2024-06-27 | Stop reason: HOSPADM

## 2024-06-27 RX ORDER — LABETALOL HYDROCHLORIDE 5 MG/ML
5 INJECTION, SOLUTION INTRAVENOUS ONCE AS NEEDED
Status: DISCONTINUED | OUTPATIENT
Start: 2024-06-27 | End: 2024-06-27 | Stop reason: HOSPADM

## 2024-06-27 RX ORDER — MIDAZOLAM HYDROCHLORIDE 1 MG/ML
INJECTION, SOLUTION INTRAMUSCULAR; INTRAVENOUS AS NEEDED
Status: DISCONTINUED | OUTPATIENT
Start: 2024-06-27 | End: 2024-06-27

## 2024-06-27 RX ORDER — GABAPENTIN 300 MG/1
600 CAPSULE ORAL ONCE
Status: COMPLETED | OUTPATIENT
Start: 2024-06-27 | End: 2024-06-27

## 2024-06-27 RX ORDER — HYDROMORPHONE HYDROCHLORIDE 1 MG/ML
1 INJECTION, SOLUTION INTRAMUSCULAR; INTRAVENOUS; SUBCUTANEOUS EVERY 5 MIN PRN
Status: DISCONTINUED | OUTPATIENT
Start: 2024-06-27 | End: 2024-06-27 | Stop reason: HOSPADM

## 2024-06-27 RX ORDER — ACETAMINOPHEN 325 MG/1
650 TABLET ORAL EVERY 6 HOURS PRN
Qty: 56 TABLET | Refills: 0 | Status: SHIPPED | OUTPATIENT
Start: 2024-06-27 | End: 2024-07-04

## 2024-06-27 RX ORDER — CEFAZOLIN SODIUM 2 G/100ML
2 INJECTION, SOLUTION INTRAVENOUS ONCE
Status: DISCONTINUED | OUTPATIENT
Start: 2024-06-27 | End: 2024-06-27 | Stop reason: HOSPADM

## 2024-06-27 RX ORDER — SODIUM CHLORIDE 9 MG/ML
100 INJECTION, SOLUTION INTRAVENOUS CONTINUOUS
Status: DISCONTINUED | OUTPATIENT
Start: 2024-06-27 | End: 2024-06-27 | Stop reason: HOSPADM

## 2024-06-27 RX ORDER — OXYCODONE HYDROCHLORIDE 5 MG/1
5 TABLET ORAL EVERY 6 HOURS PRN
Qty: 8 TABLET | Refills: 0 | Status: SHIPPED | OUTPATIENT
Start: 2024-06-27

## 2024-06-27 RX ADMIN — GABAPENTIN 600 MG: 300 CAPSULE ORAL at 07:18

## 2024-06-27 RX ADMIN — SODIUM CHLORIDE 100 ML/HR: 9 INJECTION, SOLUTION INTRAVENOUS at 07:28

## 2024-06-27 RX ADMIN — ACETAMINOPHEN 975 MG: 325 TABLET ORAL at 07:18

## 2024-06-27 RX ADMIN — CELECOXIB 400 MG: 100 CAPSULE ORAL at 07:18

## 2024-06-27 RX ADMIN — METRONIDAZOLE 500 MG: 500 INJECTION, SOLUTION INTRAVENOUS at 07:29

## 2024-06-27 SDOH — HEALTH STABILITY: MENTAL HEALTH: CURRENT SMOKER: 0

## 2024-06-27 ASSESSMENT — PAIN SCALES - GENERAL
PAINLEVEL_OUTOF10: 0 - NO PAIN
PAIN_LEVEL: 0
PAINLEVEL_OUTOF10: 0 - NO PAIN

## 2024-06-27 ASSESSMENT — PAIN - FUNCTIONAL ASSESSMENT: PAIN_FUNCTIONAL_ASSESSMENT: 0-10

## 2024-06-27 NOTE — ANESTHESIA PROCEDURE NOTES
Airway  Date/Time: 6/27/2024 7:43 AM  Airway not difficult    Staffing  Performed: attending   Authorized by: Gustabo Kenney MD    Performed by: Gustabo Kenney MD  Patient location during procedure: OR    Indications and Patient Condition  Indications for airway management: anesthesia and airway protection  Spontaneous ventilation: present  Sedation level: deep  Preoxygenated: yes  Patient position: sniffing  Mask difficulty assessment: 1 - vent by mask  Planned trial extubation    Final Airway Details  Final airway type: supraglottic airway      Successful airway: classic  Size 4  Cuff Pressure (cm H2O): 10     Number of attempts at approach: 1

## 2024-06-27 NOTE — ANESTHESIA PREPROCEDURE EVALUATION
Patient: Ashley Valencia    Procedure Information       Date/Time: 06/27/24 0730    Procedure: LABIOPLASTY REVISION - Labiaplasty revision    Location: [facility] / [facility]    Surgeons: Benjamin Lock MD            Relevant Problems   Anesthesia (within normal limits)      Cardiac   (+) HTN (hypertension)   (+) Hyperlipidemia      Neuro   (+) Depression      GI   (+) Gastrointestinal hemorrhage      /Renal   (+) CRI (chronic renal insufficiency)   (+) Nephrolithiasis   (+) UTI (urinary tract infection)      Hematology   (+) Anemia   (+) Thrombocytopenia (CMS-HCC)      ID   (+) UTI (urinary tract infection)       Clinical information reviewed:   Tobacco  Allergies  Meds   Med Hx  Surg Hx   Fam Hx          NPO Detail:  NPO/Void Status  Carbohydrate Drink Given Prior to Surgery? : N  Date of Last Liquid: 06/26/24  Time of Last Liquid: 2300  Date of Last Solid: 06/26/24  Time of Last Solid: 2000  Last Intake Type: Light meal  Time of Last Void: 0430         Physical Exam    Airway  Mallampati: II  TM distance: >3 FB  Neck ROM: full     Cardiovascular - normal exam  Rhythm: regular  Rate: normal     Dental - normal exam     Pulmonary - normal exam     Abdominal            Anesthesia Plan    History of general anesthesia?: yes  History of complications of general anesthesia?: no    ASA 2     general     The patient is not a current smoker.  Education provided regarding risk of obstructive sleep apnea.  intravenous induction   Postoperative administration of opioids is intended.  Trial extubation is planned.  Anesthetic plan and risks discussed with patient.  Use of blood products discussed with patient who.

## 2024-06-27 NOTE — OP NOTE
LABIOPLASTY REVISION / CYSTOSCOPY MEATOPLASTY Operative Note     Date: 2024  OR Location: [facility]    Name: Ashley Valencia, : 1965, Age: 59 y.o., MRN: [id number], Sex: adult    Diagnosis  Pre-op Diagnosis     * Labial pain [N94.89] Post-op Diagnosis     * Labial pain [N94.89]     Procedures  LABIOPLASTY REVISION / CYSTOSCOPY MEATOPLASTY  67699 - [facility] VULVECTOMY SIMPLE PARTIAL  Vaginoscopy    Surgeons      * Benjamin Lock - Primary    Resident/Fellow/Other Assistant:  Surgeons and Role:     * Nury Ellison MD - Resident - Assisting    Procedure Summary  Anesthesia: General  ASA: II  Anesthesia Staff: Anesthesiologist: Gustabo Kenney MD  Estimated Blood Loss: 2mL  Intra-op Medications:   Administrations occurring from 0730 to 0900 on 24:   Medication Name Total Dose   lidocaine-epinephrine (Xylocaine W/EPI) 1 %-1:100,000 injection 10 mL   gentamicin (Garamycin) 440 mg in dextrose 5% 100 mL  mg              Anesthesia Record               Intraprocedure I/O Totals          Intake    sodium chloride 0.9% infusion 650.00 mL    gentamicin (Garamycin) 440 mg in dextrose 5% 100 mL .00 mL    metroNIDAZOLE (Flagyl) 500 mg in NaCl (iso-os) 100 mL 100.00 mL    Total Intake 850 mL          Specimen: No specimens collected     Staff:   Circulator: Gerri Berg Person: Daly  Circulator: Devika         Drains and/or Catheters: * None in log *    Tourniquet Times:         Implants:     Findings: Normal bladder and vaginal mucosa, small band of tissue extending horizontally across posterior aspect of meatus (revised), redundant clitoral skin (excised)     Indications: Ashley Valencia is an 59 y.o. adult who is having surgery for Labial pain [N94.89].     The patient was seen in the preoperative area. The risks, benefits, complications, treatment options, non-operative alternatives, expected recovery and outcomes were discussed with the patient. The possibilities of reaction to medication, pulmonary  aspiration, injury to surrounding structures, bleeding, recurrent infection, the need for additional procedures, failure to diagnose a condition, and creating a complication requiring transfusion or operation were discussed with the patient. The patient concurred with the proposed plan, giving informed consent.  The site of surgery was properly noted/marked if necessary per policy. The patient has been actively warmed in preoperative area. Preoperative antibiotics have been ordered and given within 1 hours of incision. Venous thrombosis prophylaxis have been ordered including bilateral sequential compression devices    Procedure Details:   Patient was brought to the operating suite and laid supine on the operating table.  Huddle was performed.  She was anesthetized and orotracheally intubated.  She was placed in dorsolithotomy position, and prepped and draped in a standard sterile fashion.  We started the procedure with an exam under anesthesia. The clitoris was noted to be deeply buried under excess farmer tissue. We then turned our attention to the urethra. Using a 22Fr rigid cystoscope, we entered the urethra and bladder. Bladder was noted to be large capacity but with no lesions, masses, stones, or trabeculation noted. UOs were in normal orthotopic position. The scope was then slowly withdrawn to thoroughly examine the urethra which appeared healthy and patent with no strictures. We did note a  small band of tissue extending horizontally across posterior aspect of meatus at this time    Next,  we turned our attention to the vagina. Using our same scope, we entered the vaginal canal which was noted to be patent, well perfused, and well healed with no lesions or signs of stenosis.    After the thorough examination above, we turned our attention to our repairs. First, we performed a meatoplasty by placing a straight hemostat along the posterior meatal skin bridge. This was held several minutes to obtain adequate  hemostasis before hemostat was removed and straight iris scissors were used to incise along the area of crushed skin to release this band. Several 4-0 monocryl sutures were placed in simple interrupted fashion to close this skin defect and at the end of this portion of the procedure no meatal obstruction was noted.    We then proceeded to the labioplasty/clitoral farmer revision. We began by making a 2cm vertical midline incision along the clitoral farmer. Using straight iris scissors, we excised the flaps of excess tissue created by our midline incision until a small clitoral farmer remained without burying the clitoris. Finally, we closed this defect in two layers on each side of the farmer first, by approximating the dermal tissue using 3-0 vicryl in simple interrupted fashion and next, by using 4-0 monocryl to place interrupted vertical mattress sutures along the skin to create a hooded effect. At the end of the case, cosmesis was excellent. We then placed dermabond on the clitoral farmer incisions and bacitracin on the incision from our meatoplasty. This concluded the case. The patient was awakened from anesthesia and transported to PACU in stable condition.    Complications:  None; patient tolerated the procedure well.    Disposition: PACU - hemodynamically stable.  Condition: stable         Additional Details:   -Patient to follow up 07/03/24 for post op check as previously scheduled    Attending Attestation: I was present for the entire procedure.    Benjamin Lock  Phone Number: [phone]

## 2024-06-27 NOTE — H&P
History Of Present Illness  Ashley Valencia is a 59 y.o. adult with PMH gender dysphoria s/p robot assisted peritoneal pull through vaginoplasty 10/2023 c/b excess labial skin and decreased clitoral sensation who presents for revision labioplasty.     Past Medical History  She has a past medical history of Calculus of kidney (01/28/2019), Depression, Fibromyalgia, Gender dysphoria, HLD (hyperlipidemia), HTN (hypertension), IBS (irritable bowel syndrome), Migraines, Personal history of colonic polyps (05/07/2021), and Unspecified hydronephrosis (10/08/2020).    Surgical History  She has a past surgical history that includes Hemorrhoid surgery (12/11/2018) and Other surgical history (12/11/2018)., RA PPTVP, URS/LL x2     Social History  She reports that she has never smoked. She has never used smokeless tobacco. She reports that she does not currently use alcohol. She reports that she does not use drugs.    Family History  Family History   Problem Relation Name Age of Onset    Breast cancer Mother      Dementia Mother      Hypertension Mother      Carpal tunnel syndrome Mother      Osteoarthritis Mother      Diabetes Other      Arthritis Other      Cancer Other      Hypertension Other      Alcohol abuse Other          Allergies  Patient has no known allergies.    Review of Symptoms  Negative other than as noted in the HPI     Physical Exam  Constitutional:       General: She is not in acute distress.     Appearance: Normal appearance. She is not ill-appearing or toxic-appearing.   HENT:      Head: Normocephalic and atraumatic.   Eyes:      Extraocular Movements: Extraocular movements intact.   Cardiovascular:      Rate and Rhythm: Normal rate.   Pulmonary:      Effort: Pulmonary effort is normal. No respiratory distress.   Abdominal:      General: There is no distension.   Musculoskeletal:         General: Normal range of motion.   Skin:     General: Skin is warm and dry.   Neurological:      General: No focal  deficit present.      Mental Status: She is alert and oriented to person, place, and time.   Psychiatric:         Mood and Affect: Mood normal.         Behavior: Behavior normal.          Last Recorded Vitals  Weight 88.9 kg (195 lb 15.8 oz).    Relevant Results    No results found for this or any previous visit (from the past 24 hour(s)).    No results found.       Assessment/Plan   Principal Problem:    Labial pain    Ashley Valencia is a 59 y.o. adult with PMH gender dysphoria s/p robot assisted peritoneal pull through vaginoplasty 10/2023 c/b excess labial skin and decreased clitoral sensation who presents for revision labioplasty.       -Proceed to OR for revision labioplasty as planned  -Follow up 07/03 as previously scheduled.      Nury Ellison MD

## 2024-06-27 NOTE — BRIEF OP NOTE
Date: 2024  OR Location: [facility]    Name: Ashley Valencia, : 1965, Age: 59 y.o., MRN: [id number], Sex: adult    Diagnosis  Pre-op Diagnosis     * Labial pain [N94.89] Post-op Diagnosis     * Labial pain [N94.89]     Procedures  LABIOPLASTY REVISION / CYSTOSCOPY MEATOPLASTY  00528 - [place] VULVECTOMY SIMPLE PARTIAL  Vaginoscopy    Surgeons      * Benjamin Lock - Primary    Resident/Fellow/Other Assistant:  Surgeons and Role:     * Nury Ellison MD - Resident - Assisting    Procedure Summary  Anesthesia: General  ASA: II  Anesthesia Staff: Anesthesiologist: Gustabo Kenney MD  Estimated Blood Loss: 2mL  Intra-op Medications:   Administrations occurring from 0730 to 0900 on 24:   Medication Name Total Dose   lidocaine-epinephrine (Xylocaine W/EPI) 1 %-1:100,000 injection 10 mL   gentamicin (Garamycin) 440 mg in dextrose 5% 100 mL  mg              Anesthesia Record               Intraprocedure I/O Totals          Intake    sodium chloride 0.9% infusion 650.00 mL    gentamicin (Garamycin) 440 mg in dextrose 5% 100 mL .00 mL    metroNIDAZOLE (Flagyl) 500 mg in NaCl (iso-os) 100 mL 100.00 mL    Total Intake 850 mL          Specimen: No specimens collected     Staff:   Circulator: Gerri  Scrpatricia Person: Daly  Circulator: Devika      Findings: Normal bladder and vaginal mucosa, small band of tissue extending horizontally across posterior aspect of meatus (revised), redundant clitoral skin (excised)    Complications:  None; patient tolerated the procedure well.     Disposition: PACU - hemodynamically stable.  Condition: stable  Specimens Collected: No specimens collected  Attending Attestation: I was present and scrubbed for the key portions of the procedure.    Benjamin Lock  Phone Number: [phone]

## 2024-06-27 NOTE — ANESTHESIA POSTPROCEDURE EVALUATION
Patient: Ashley Valencia    Procedure Summary       Date: 06/27/24 Room / Location: [facility] OR 03 / Virtual [facility] OR    Anesthesia Start: 0737 Anesthesia Stop:     Procedure: LABIOPLASTY REVISION / CYSTOSCOPY MEATOPLASTY Diagnosis:       Labial pain      (Labial pain [N94.89])    Surgeons: Benjamin Lock MD Responsible Provider: Gustabo Kenney MD    Anesthesia Type: general ASA Status: 2            Anesthesia Type: general    Vitals Value Taken Time   /75 06/27/24 0904   Temp 36.2 06/27/24 0904   Pulse 83 06/27/24 0904   Resp 14 06/27/24 0904   SpO2 100 06/27/24 0904       Anesthesia Post Evaluation    Patient location during evaluation: PACU  Patient participation: complete - patient participated  Level of consciousness: awake and alert  Pain score: 0  Pain management: adequate  Airway patency: patent  Cardiovascular status: acceptable  Respiratory status: acceptable  Hydration status: acceptable  Postoperative Nausea and Vomiting: none        No notable events documented.

## 2024-06-27 NOTE — DISCHARGE INSTRUCTIONS
[facility]    DISCHARGE INSTRUCTIONS     Revision clitoral farmer, cystoscopy, vaginoscopy, meatotomy    Ashley Valencia      Call [phone] during regular daytime business hours (8:00 am - 5:00 pm) and after 5:00 pm and ask for the Urology resident with any questions or concerns.    If it is a life-threatening situation, proceed to the nearest emergency department.        Follow-up appointment:  07/03/24 APRHAWA [place]      Thank you for the opportunity to care for you today.  Your health and healing are very important to us.  We hope we made you feel as comfortable as possible and are committed to your recovery and continued well-being.      The following is a brief overview of your labioplasty procedure. Some of the information contained on this summary may be confidential.  This information should be kept in your records and should be shared with your regular doctor.    Physicians:   Dr. Lock    Procedure performed: removal/rearrangement of excess clitoral skin, looking into the bladder and vagina with a camera, removal of small band of tissue at urethral meatus      What to Expect During your Recovery and Home Care  Anesthesia Side Effects   You may feel sleepy, tired, or have a sore throat.   You may also feel drowsiness, dizziness, or inability to think clearly.  For your safety, do not drive, drink alcoholic beverages, take any unprescribed medication or make any important decisions for 24 hours after anesthesia.  A responsible adult should be with you for 24 hours.        Activity and Recovery    No heavy lifting greater than 10 pounds for the next 6 weeks. No driving until mobility has returned to normal. Do not drive or operate heavy machinery while taking narcotic pain medications as these medications can alter perception, impair judgement, and slow reaction times.    Pain Control/Urination  Unfortunately, you may experience pain after your procedure. Frequency, urgency and spraying of urine  are expected. If you don't feel like you are not emptying your bladder all the way, please notify your provider.   Adequate pain management can include alternative measures to help ease your pain and that can include over the counter Tylenol/ibuprofen or oxycodone which can be taken as prescribed as needed for breakthrough pain. Do not take more than 4,000mg of Tylenol in a 24-hour period.      Nausea/Vomiting   Clear liquids are best tolerated at first. Start slow, advance your diet as tolerated to normal foods. Avoid spicy, greasy, heavy foods at first. Also, you may feel nauseous or like you need to vomit if you take any type of medication on an empty stomach.  Call your physician if you are unable to eat or drink, are not passing gas and have persistent vomiting.    Signs of Bleeding   You could have some blood in your urine off and on over the next several weeks. Your urine will be light pink to yellow. Minor bleeding or drainage may occur from the surgical sites; however, excessive or consistent bleeding should be reported to your surgeon. Excessive bleeding is defined as blood that is dripping from wound, soaking you bandages, and is ketchup colored, thick with possible blood clots.  Consistent is defined as bleeding that does not stop.      Treatment/wound care:   Keep area(s) clean and dry.   It is okay to shower 48 hours after time of surgery.    Do not scrub wound(s), pat dry.    Do not submerge wound(s) in standing water until seen for follow up appointment (no tub bathing, swimming, or hot tubs).    Clean with mild soap, gentle washing, pat dry, cover with bandage as needed.    Avoid waterproof bandages.  No oils or lotions on incisions.  You may apply bacitracin to your incisions twice a day to promote healing    Please visually inspect your wound(s) at least once daily.  If the wound(s) are in a difficult to see location, please use a mirror or have someone else assist with visual inspection.    If  wounds start to open op slightly this is common but inform your provider. Your sutures will dissolve on their own.    Signs of Infection  Signs of infection can include fever, chills, redness around surgical incisions, green/yellow drainage from incisions, burning sensation with urination or severe abdominal pain.  If you see any of these occur, please contact your doctor's office at [phone].  Any fever higher than 100.4, especially if associated with an ill feeling, abdominal pain, chills, or nausea should be reported to your surgeon.      Assist in bowel movements/urination  Take daily stool softener you were prescribed  Increase fiber in diet  Increase water (6 to 8 glasses)  Increase walking   Can try adding over the counter MiraLAX or in extreme cases milk of magnesia.  If you have tried these methods and you are not passing gas, your bladder still feels full and you cannot have a bowel movement, please go to the [facility] or [facility] Emergency Room.    Additional Instructions:   DILATING and DOUCHING  You will need to continue to your vaginal canal 3 times per day.  Try to advance the dilators as deep as possible and keep a track of how many dots you are able to advance on the dilators.     FOLLOW UP AND EMERGENCY  We will see you for post op follow up as previously scheduled.  If there is an emergency [phone]  during regular business hours, after business hours call [phone] and ask for the Urology resident on call. OR ONLY GO TO THE \[facility] [place] [facility]\"" OR [facility] ER.

## 2024-07-03 ENCOUNTER — APPOINTMENT (OUTPATIENT)
Dept: UROLOGY | Facility: CLINIC | Age: 59
End: 2024-07-03
Payer: COMMERCIAL

## 2024-07-03 VITALS
WEIGHT: 198.8 LBS | DIASTOLIC BLOOD PRESSURE: 89 MMHG | BODY MASS INDEX: 26.93 KG/M2 | SYSTOLIC BLOOD PRESSURE: 142 MMHG | HEIGHT: 72 IN | TEMPERATURE: 96.4 F | HEART RATE: 74 BPM

## 2024-07-03 DIAGNOSIS — N32.81 OAB (OVERACTIVE BLADDER): ICD-10-CM

## 2024-07-03 DIAGNOSIS — R32 URINARY INCONTINENCE, UNSPECIFIED TYPE: ICD-10-CM

## 2024-07-03 DIAGNOSIS — F64.9 GENDER DYSPHORIA: Primary | ICD-10-CM

## 2024-07-03 PROCEDURE — 3079F DIAST BP 80-89 MM HG: CPT | Performed by: NURSE PRACTITIONER

## 2024-07-03 PROCEDURE — 3077F SYST BP >= 140 MM HG: CPT | Performed by: NURSE PRACTITIONER

## 2024-07-03 PROCEDURE — 99024 POSTOP FOLLOW-UP VISIT: CPT | Performed by: NURSE PRACTITIONER

## 2024-07-03 NOTE — PROGRESS NOTES
GENDER CARE POST-OP     HISTORY OF PRESENT ILLNESS:   Ashley Valencia is a 58 y.o. trans woman s/p PPT vaginoplasty 10/3/23  Nephrolithiasis s/p ureteroscopy and B stent placement per Dr. Blake 9/26/23  Repeat ureteroscopy with L laser lithotripsy and ureteral stent placement; stents removed 11/30/23  Labioplasty, clitoral farmer revision & meatoplasty 6/27/24    INTERVAL HISTORY:  Returns today for POV  Seen unaccompanied  Doing well post-op   Went for a two mile walk later the same day  Hasn't required pain meds, used ice once  Seeing mix of blood and urine on pad  Stream much improved    PAST MEDICAL HISTORY:  Past Medical History:   Diagnosis Date    Calculus of kidney 01/28/2019    Kidney stone on left side    Depression     Fibromyalgia     Gender dysphoria     HLD (hyperlipidemia)     HTN (hypertension)     IBS (irritable bowel syndrome)     Migraines     Personal history of colonic polyps 05/07/2021    Unspecified hydronephrosis 10/08/2020    Hydronephrosis, left       PAST SURGICAL HISTORY:  Past Surgical History:   Procedure Laterality Date    HEMORRHOID SURGERY  12/11/2018    Hemorrhoidectomy    OTHER SURGICAL HISTORY  12/11/2018    Colonoscopy        ALLERGIES:   No Known Allergies     MEDICATIONS:     Current Outpatient Medications:     acetaminophen (Tylenol) 325 mg tablet, Take 2 tablets (650 mg) by mouth every 6 hours if needed for mild pain (1 - 3) for up to 7 days., Disp: 56 tablet, Rfl: 0    alcohol swabs pads, medicated, , Disp: , Rfl:     allopurinol (Zyloprim) 100 mg tablet, Take 1 tablet (100 mg) by mouth once daily in the evening. Take with meals., Disp: 30 tablet, Rfl: 11    amitriptyline (Elavil) 25 mg tablet, Take 3 tablets (75 mg) by mouth once daily at bedtime., Disp: , Rfl:     ascorbic acid/collagen hydr (COLLAGEN SKIN RENEWAL ORAL), Take 1 Capful by mouth once daily with a meal., Disp: , Rfl:     BACILLUS COAGULANS-INULIN ORAL, Take 1 capsule by mouth once daily with a meal., Disp: ,  "Rfl:     BD Luer-Orestes Syringe 1 mL syringe, , Disp: , Rfl:     BD Regular Bevel Needles 18 gauge x 1 1/2\" needle, , Disp: , Rfl:     BD SafetyGlide Syringe 3 mL 23 x 1\" syringe, , Disp: , Rfl:     cholecalciferol (Vitamin D-3) 50 mcg (2,000 unit) capsule, Take 1 capsule (50 mcg) by mouth once daily., Disp: , Rfl:     estradiol valerate (Delestrogen) 20 mg/mL injection, Inject 1 mL (20 mg) into the muscle 1 (one) time per week. On Thursday @ 8pm, Disp: , Rfl:     fremanezumab (Ajovy Autoinjector) 225 mg/1.5 mL auto-injector, 1 Pen (225 mg) every 28 (twenty-eight) days., Disp: , Rfl:     lidocaine-prilocaine (Emla) 2.5-2.5 % cream, Apply topically., Disp: , Rfl:     losartan (Cozaar) 50 mg tablet, Take 2 tablets (100 mg) by mouth once daily., Disp: , Rfl:     MAGNESIUM MAL, THREON, CHELATE ORAL, Take 144 mg by mouth once daily in the evening. Take with meals., Disp: , Rfl:     melatonin 10 mg tablet, Take 1 tablet (10 mg) by mouth as needed at bedtime., Disp: , Rfl:     MULTIVITAMIN ORAL, Take 1 tablet by mouth 2 times a day., Disp: , Rfl:     Nurtec ODT 75 mg tablet,disintegrating, Take 1 tablet (75 mg) by mouth once daily as needed., Disp: , Rfl:     oxyCODONE (Roxicodone) 5 mg immediate release tablet, Take 1 tablet (5 mg) by mouth every 6 hours if needed for severe pain (7 - 10) for up to 8 doses. Take one tablet every 6 hours as needed for pain not controlled by tylenol, Disp: 8 tablet, Rfl: 0    potassium citrate CR (Urocit-K-10) 10 mEq ER tablet, Take 1 tablet (10 mEq) by mouth 2 times a day., Disp: 60 tablet, Rfl: 11    progesterone (Prometrium) 200 mg capsule, Take 1 capsule (200 mg) by mouth once daily at bedtime., Disp: , Rfl:     sodium bicarbonate 650 mg tablet, Take 1 tablet (650 mg) by mouth 2 times a day. Before breakfast and at bedtime, Disp: , Rfl:     surgical lubricant (Surgilube) gel, USE ONE GRAM THREE TIMES A DAY FOR VAGINAL DILATION FOR THREE MONTHS, THEN AS INSTRUCTED., Disp: 240.98 g, Rfl: " 11    tadalafil (Cialis) 5 mg tablet, Take 1 tablet (5 mg) by mouth once daily as needed (sexual activity)., Disp: 10 tablet, Rfl: 0    verapamil SR (Calan-SR) 180 mg ER tablet, Take 1 tablet (180 mg) by mouth once daily at bedtime. Do not crush or chew., Disp: , Rfl:     vitamin B complex (Vitamins B Complex) tablet, Take 1 tablet by mouth 2 times daily (morning and late afternoon)., Disp: , Rfl:      SOCIAL HISTORY:  Patient  reports that she has never smoked. She has never used smokeless tobacco. She reports that she does not currently use alcohol. She reports that she does not use drugs.   Social History     Socioeconomic History    Marital status: Single     Spouse name: Not on file    Number of children: Not on file    Years of education: Not on file    Highest education level: Not on file   Occupational History    Not on file   Tobacco Use    Smoking status: Never    Smokeless tobacco: Never   Vaping Use    Vaping status: Never Used   Substance and Sexual Activity    Alcohol use: Not Currently    Drug use: Never    Sexual activity: Defer   Other Topics Concern    Not on file   Social History Narrative    Not on file     Social Determinants of Health     Financial Resource Strain: Low Risk  (1/10/2024)    Received from [facility]    Overall Financial Resource Strain (CARDIA)     Difficulty of Paying Living Expenses: Not hard at all   Food Insecurity: No Food Insecurity (1/10/2024)    Received from [facility]    Hunger Vital Sign     Worried About Running Out of Food in the Last Year: Never true     Ran Out of Food in the Last Year: Never true   Transportation Needs: No Transportation Needs (1/10/2024)    Received from [facility]    PRAPARE - Transportation     Lack of Transportation (Medical): No     Lack of Transportation (Non-Medical): No   Physical Activity: Inactive (1/10/2024)    Received from [facility]    Exercise Vital Sign     Days of Exercise per Week: 0 days     Minutes of Exercise per Session:  0 min   Stress: No Stress Concern Present (1/10/2024)    Received from [facility]    [place] [employer] - Occupational Stress Questionnaire     Feeling of Stress : Not at all   Social Connections: Moderately Isolated (1/10/2024)    Received from [facility]    Social Connection and Isolation Panel [NHANES]     Frequency of Communication with Friends and Family: More than three times a week     Frequency of Social Gatherings with Friends and Family: More than three times a week     Attends [employer] Services: Never     Active Member of Clubs or Organizations: Yes     Attends Club or Organization Meetings: More than 4 times per year     Marital Status: Never    Intimate Partner Violence: Not At Risk (1/10/2024)    Received from [facility]    Humiliation, Afraid, Rape, and Kick questionnaire     Fear of Current or Ex-Partner: No     Emotionally Abused: No     Physically Abused: No     Sexually Abused: No   Housing Stability: Low Risk  (1/10/2024)    Received from [facility]    Housing Stability Vital Sign     Unable to Pay for Housing in the Last Year: No     Number of Places Lived in the Last Year: 1     Unstable Housing in the Last Year: No       FAMILY HISTORY:  Family History   Problem Relation Name Age of Onset    Breast cancer Mother      Dementia Mother      Hypertension Mother      Carpal tunnel syndrome Mother      Osteoarthritis Mother      Diabetes Other      Arthritis Other      Cancer Other      Hypertension Other      Alcohol abuse Other         REVIEW OF SYSTEMS:  All systems reviewed, pertinent negatives as noted in HPI.    PHYSICAL EXAM:  Visit Vitals  /89   Pulse 74   Temp 35.8 °C (96.4 °F)     Constitutional: Well-developed and well-nourished. No distress.    Head: Normocephalic and atraumatic.    Neck: Normal range of motion.    Pulmonary/Chest: Effort normal. No respiratory distress.   Abdominal: Nondistended.  : See below.  Integumentary: No rash or  lesions.  Musculoskeletal: Normal range of motion.    Neurological: Alert and oriented.  Psychiatric: Normal mood and affect. Thought content normal.          LABORATORY REVIEW:   Lab Results   Component Value Date    BUN 17 10/24/2023    CREATININE 1.00 10/24/2023    EGFR 88 10/24/2023     10/24/2023    K 3.7 10/24/2023     (H) 10/24/2023    CO2 21 10/24/2023    CALCIUM 8.2 (L) 10/24/2023      Lab Results   Component Value Date    WBC 8.7 10/24/2023    RBC 3.85 (L) 10/24/2023    HGB 11.2 (L) 10/24/2023    HCT 34.2 (L) 10/24/2023    MCV 89 10/24/2023    MCH 29.1 10/24/2023    MCHC 32.7 10/24/2023    RDW 13.5 10/24/2023     10/24/2023    MPV 9.6 10/24/2023               Assessment:     Encounter Diagnoses   Name Primary?    Gender dysphoria Yes    Urinary incontinence, unspecified type     OAB (overactive bladder)        Ashley Valencia is a 58 y.o. trans woman s/p PPT vaginoplasty 10/3/23  Nephrolithiasis s/p ureteroscopy and B stent placement per Dr. Blake 9/26/23  Repeat ureteroscopy with L laser lithotripsy and ureteral stent placement; stents removed 11/30/23  Labioplasty, clitoral farmer revision & meatoplasty 6/27/24  Too soon post-op to gauge clitoral sensation  Continues to have leakage of urine    Nice aesthetic result, no significant swelling  Loose Dermabond removed from R side    Plan:   Refer to pelvic floor PT for incontinence  Discussed wound care BID with bacitracin  Continue to dilate as prior to revision  RTC in 3-4 weeks for reassessment  Encouraged to call in the interim with any questions, concerns

## 2024-07-03 NOTE — PATIENT INSTRUCTIONS
Pelvic Floor PT Locations    [facility]  [address]  [place] [address]  [phone]      Ave Rosen, PT  B/B [facility]  [address]  [place] [address]  [phone]      Marichuy Price, PT    B/B  [facility] [place]  [address] [place] [address]  [phone]    Darcy Mcmillan, PT         B/B   [facility]  [address] [place] [address]  [phone]    Jihan Miller, PT  (PRN)      B/B  Petrona Quintana, PT       B  Tresa Sánchez PT          B [facility]  [address] [place] [address]  [phone]      Massiel Bettencourt, PT      B/B  Yolie Hernández, PT      B/B [facility]  [address]  [place] [address]  [phone]      Jordan Caro, PT      B    [facility]  [address] [place] [address]  [phone]      Jocelyne Sanders, PT (PRN)  B  Anna Schmid, PT              B/[facility]  [address] [place] [address]  [phone]    Elsie Christine PT B [facility]  [address]  [place] [address]  [phone]      Bridget Go, PT   B   [facility]  [address]  [place] [address]  [phone]      Lexi Guevara, PT     B/B  Melinda Lam, PT              B/B [facility]  [address] [place] [address]  [phone]    Radha Cedeno, PT   (incontinence only) [facility]  [address]  [facility]. [place] [id number] [phone]    Zoie Wilder PT   B       [facility]    [address] [place] [address]  [phone]      Blanche Jhaveri, PT     B  Tashia Romero PT    B [facility], [address]  [place], [place] [id number] [phone]    Tashia Lucero, PT       B/B  Darcy Mcmillan, PT         B  Misty Boyd PT     B/B [facility]  [address]  [place], [place] [id number] [phone]      Vesna Ansari, PT         B/B  Janell Carter, PT   B/B     [facility]  [address]  [place] [address]  [phone]      Melinda Lam, PT                  B/B  Tashia Simpson PT      B [facility], [address] [address]  [phone]    Misty Boyd, PT     B/B   males/females Athletes only:   [facility]  [address]  [place] [address] [phone]    Alissa Chou, PT    B   [facility] - [address]  [address] [place] [address]  [phone]    Xiomara Quiroz, PT B/B        B/B: [facility]

## 2024-07-24 ENCOUNTER — APPOINTMENT (OUTPATIENT)
Dept: UROLOGY | Facility: CLINIC | Age: 59
End: 2024-07-24
Payer: COMMERCIAL

## 2024-07-24 VITALS
DIASTOLIC BLOOD PRESSURE: 89 MMHG | WEIGHT: 202 LBS | HEIGHT: 72 IN | SYSTOLIC BLOOD PRESSURE: 152 MMHG | BODY MASS INDEX: 27.36 KG/M2 | HEART RATE: 72 BPM

## 2024-07-24 DIAGNOSIS — F64.9 GENDER DYSPHORIA: Primary | ICD-10-CM

## 2024-07-24 DIAGNOSIS — F52.31 ANORGASMIA OF FEMALE: ICD-10-CM

## 2024-07-24 DIAGNOSIS — R32 URINARY INCONTINENCE, UNSPECIFIED TYPE: ICD-10-CM

## 2024-07-24 PROCEDURE — 3077F SYST BP >= 140 MM HG: CPT | Performed by: NURSE PRACTITIONER

## 2024-07-24 PROCEDURE — 99024 POSTOP FOLLOW-UP VISIT: CPT | Performed by: NURSE PRACTITIONER

## 2024-07-24 PROCEDURE — 3008F BODY MASS INDEX DOCD: CPT | Performed by: NURSE PRACTITIONER

## 2024-07-24 PROCEDURE — 3079F DIAST BP 80-89 MM HG: CPT | Performed by: NURSE PRACTITIONER

## 2024-07-24 NOTE — PROGRESS NOTES
GENDER CARE POST-OP     HISTORY OF PRESENT ILLNESS:   Ashley Valencia is a 59 y.o. trans woman s/p PPT vaginoplasty 10/3/23  Nephrolithiasis s/p ureteroscopy and B stent placement per Dr. Blake 9/26/23  Repeat ureteroscopy with L laser lithotripsy and ureteral stent placement; stents removed 11/30/23  Labioplasty, clitoral farmer revision & meatoplasty 6/27/24    INTERVAL HISTORY:  Returns today for POV  Seen unaccompanied  Doing well overall  More vulvar/clitoral sensation with sitting  Asking about exploring, sexual activity  Not yet scheduled with PT for urinary leakage  Continues to dilate daily  Some resistance with blue dilator, ok with green & orange  Not looking at dots, about 3 inches out with all 3    PAST MEDICAL HISTORY:  Past Medical History:   Diagnosis Date    Calculus of kidney 01/28/2019    Kidney stone on left side    Depression     Fibromyalgia     Gender dysphoria     HLD (hyperlipidemia)     HTN (hypertension)     IBS (irritable bowel syndrome)     Migraines     Personal history of colonic polyps 05/07/2021    Unspecified hydronephrosis 10/08/2020    Hydronephrosis, left       PAST SURGICAL HISTORY:  Past Surgical History:   Procedure Laterality Date    HEMORRHOID SURGERY  12/11/2018    Hemorrhoidectomy    OTHER SURGICAL HISTORY  12/11/2018    Colonoscopy        ALLERGIES:   No Known Allergies     MEDICATIONS:     Current Outpatient Medications:     alcohol swabs pads, medicated, , Disp: , Rfl:     allopurinol (Zyloprim) 100 mg tablet, Take 1 tablet (100 mg) by mouth once daily in the evening. Take with meals., Disp: 30 tablet, Rfl: 11    amitriptyline (Elavil) 25 mg tablet, Take 3 tablets (75 mg) by mouth once daily at bedtime., Disp: , Rfl:     ascorbic acid/collagen hydr (COLLAGEN SKIN RENEWAL ORAL), Take 1 Capful by mouth once daily with a meal., Disp: , Rfl:     BACILLUS COAGULANS-INULIN ORAL, Take 1 capsule by mouth once daily with a meal., Disp: , Rfl:     BD Luer-Orestes Syringe 1 mL  "syringe, , Disp: , Rfl:     BD Regular Bevel Needles 18 gauge x 1 1/2\" needle, , Disp: , Rfl:     BD SafetyGlide Syringe 3 mL 23 x 1\" syringe, , Disp: , Rfl:     cholecalciferol (Vitamin D-3) 50 mcg (2,000 unit) capsule, Take 1 capsule (50 mcg) by mouth once daily., Disp: , Rfl:     estradiol valerate (Delestrogen) 20 mg/mL injection, Inject 1 mL (20 mg) into the muscle 1 (one) time per week. On Thursday @ 8pm, Disp: , Rfl:     lidocaine-prilocaine (Emla) 2.5-2.5 % cream, Apply topically., Disp: , Rfl:     losartan (Cozaar) 50 mg tablet, Take 2 tablets (100 mg) by mouth once daily., Disp: , Rfl:     MAGNESIUM MAL, THREON, CHELATE ORAL, Take 144 mg by mouth once daily in the evening. Take with meals., Disp: , Rfl:     melatonin 10 mg tablet, Take 1 tablet (10 mg) by mouth as needed at bedtime., Disp: , Rfl:     MULTIVITAMIN ORAL, Take 1 tablet by mouth 2 times a day., Disp: , Rfl:     Nurtec ODT 75 mg tablet,disintegrating, Take 1 tablet (75 mg) by mouth once daily as needed., Disp: , Rfl:     onabotulinumtoxinA (Botox) 100 unit injection, Inject into the muscle 1 time., Disp: , Rfl:     oxyCODONE (Roxicodone) 5 mg immediate release tablet, Take 1 tablet (5 mg) by mouth every 6 hours if needed for severe pain (7 - 10) for up to 8 doses. Take one tablet every 6 hours as needed for pain not controlled by tylenol, Disp: 8 tablet, Rfl: 0    potassium citrate CR (Urocit-K-10) 10 mEq ER tablet, Take 1 tablet (10 mEq) by mouth 2 times a day., Disp: 60 tablet, Rfl: 11    progesterone (Prometrium) 200 mg capsule, Take 1 capsule (200 mg) by mouth once daily at bedtime., Disp: , Rfl:     sodium bicarbonate 650 mg tablet, Take 1 tablet (650 mg) by mouth 2 times a day. Before breakfast and at bedtime, Disp: , Rfl:     surgical lubricant (Surgilube) gel, USE ONE GRAM THREE TIMES A DAY FOR VAGINAL DILATION FOR THREE MONTHS, THEN AS INSTRUCTED., Disp: 240.98 g, Rfl: 11    verapamil SR (Calan-SR) 180 mg ER tablet, Take 1 tablet (180 " mg) by mouth once daily at bedtime. Do not crush or chew., Disp: , Rfl:     vitamin B complex (Vitamins B Complex) tablet, Take 1 tablet by mouth 2 times daily (morning and late afternoon)., Disp: , Rfl:     tadalafil (Cialis) 5 mg tablet, Take 1 tablet (5 mg) by mouth once daily as needed (sexual activity)., Disp: 10 tablet, Rfl: 0     SOCIAL HISTORY:  Patient  reports that she has never smoked. She has never used smokeless tobacco. She reports that she does not currently use alcohol. She reports that she does not use drugs.   Social History     Socioeconomic History    Marital status: Single     Spouse name: Not on file    Number of children: Not on file    Years of education: Not on file    Highest education level: Not on file   Occupational History    Not on file   Tobacco Use    Smoking status: Never    Smokeless tobacco: Never   Vaping Use    Vaping status: Never Used   Substance and Sexual Activity    Alcohol use: Not Currently    Drug use: Never    Sexual activity: Defer   Other Topics Concern    Not on file   Social History Narrative    Not on file     Social Determinants of Health     Financial Resource Strain: Low Risk  (1/10/2024)    Received from [employer]    Overall Financial Resource Strain (CARDIA)     Difficulty of Paying Living Expenses: Not hard at all   Food Insecurity: No Food Insecurity (1/10/2024)    Received from [employer]    Hunger Vital Sign     Worried About Running Out of Food in the Last Year: Never true     Ran Out of Food in the Last Year: Never true   Transportation Needs: No Transportation Needs (1/10/2024)    Received from [employer]    PRAPARE - Transportation     Lack of Transportation (Medical): No     Lack of Transportation (Non-Medical): No   Physical Activity: Inactive (1/10/2024)    Received from [employer]    Exercise Vital Sign     Days of Exercise per Week: 0 days     Minutes of Exercise per Session: 0 min   Stress:  No Stress Concern Present (1/10/2024)    Received from [employer]    [employer] [place] of Occup[employer] - Occupational Stress Questionnaire     Feeling of Stress : Not at all   Social Connections: Moderately Isolated (1/10/2024)    Received from [employer]    Social Connection and Isolation Panel [NHANES]     Frequency of Communication with Friends and Family: More than three times a week     Frequency of Social Gatherings with Friends and Family: More than three times a week     Attends Yazidi Services: Never     Active Member of Clubs or Organizations: Yes     Attends Club or Organization Meetings: More than 4 times per year     Marital Status: Never    Intimate Partner Violence: Not At Risk (1/10/2024)    Received from [employer]    Humiliation, Afraid, Rape, and Kick questionnaire     Fear of Current or Ex-Partner: No     Emotionally Abused: No     Physically Abused: No     Sexually Abused: No   Housing Stability: Low Risk  (1/10/2024)    Received from [employer]    Housing Stability Vital Sign     Unable to Pay for Housing in the Last Year: No     Number of Places Lived in the Last Year: 1     Unstable Housing in the Last Year: No       FAMILY HISTORY:  Family History   Problem Relation Name Age of Onset    Breast cancer Mother      Dementia Mother      Hypertension Mother      Carpal tunnel syndrome Mother      Osteoarthritis Mother      Diabetes Other      Arthritis Other      Cancer Other      Hypertension Other      Alcohol abuse Other         REVIEW OF SYSTEMS:  All systems reviewed, pertinent negatives as noted in HPI.    PHYSICAL EXAM:  Visit Vitals  /89   Pulse 72     Constitutional: Well-developed and well-nourished. No distress.    Head: Normocephalic and atraumatic.    Neck: Normal range of motion.    Pulmonary/Chest: Effort normal. No respiratory distress.   Abdominal: Nondistended.  : See below.  Integumentary: No rash  or lesions.  Musculoskeletal: Normal range of motion.    Neurological: Alert and oriented.  Psychiatric: Normal mood and affect. Thought content normal.          LABORATORY REVIEW:   Lab Results   Component Value Date    BUN 17 10/24/2023    CREATININE 1.00 10/24/2023    EGFR 88 10/24/2023     10/24/2023    K 3.7 10/24/2023     (H) 10/24/2023    CO2 21 10/24/2023    CALCIUM 8.2 (L) 10/24/2023      Lab Results   Component Value Date    WBC 8.7 10/24/2023    RBC 3.85 (L) 10/24/2023    HGB 11.2 (L) 10/24/2023    HCT 34.2 (L) 10/24/2023    MCV 89 10/24/2023    MCH 29.1 10/24/2023    MCHC 32.7 10/24/2023    RDW 13.5 10/24/2023     10/24/2023    MPV 9.6 10/24/2023               Assessment:     Encounter Diagnoses   Name Primary?    Gender dysphoria Yes    Urinary incontinence, unspecified type     Anorgasmia of female        Ashley Valencia is a 59 y.o. trans woman s/p PPT vaginoplasty 10/3/23  Nephrolithiasis s/p ureteroscopy and B stent placement per Dr. Blake 9/26/23  Repeat ureteroscopy with L laser lithotripsy and ureteral stent placement; stents removed 11/30/23  Labioplasty, clitoral farmer revision & meatoplasty 6/27/24  Continues to have leakage of urine; scant amount seen on pad now  Otherwise doing really well    Plan:   Try sexual exploration, review videos from mental health provider  Trial of every other day dilation  Continue prn tadalafil - only used one dose prior to surgery  Schedule with pelvic floor PT for incontinence  RTC in 4-6 weeks for reassessment  Encouraged to call in the interim with any questions, concerns

## 2024-09-04 ENCOUNTER — APPOINTMENT (OUTPATIENT)
Dept: UROLOGY | Facility: CLINIC | Age: 59
End: 2024-09-04
Payer: COMMERCIAL

## 2024-09-04 VITALS
SYSTOLIC BLOOD PRESSURE: 135 MMHG | WEIGHT: 196 LBS | HEART RATE: 73 BPM | HEIGHT: 72 IN | TEMPERATURE: 97.1 F | DIASTOLIC BLOOD PRESSURE: 87 MMHG | BODY MASS INDEX: 26.55 KG/M2

## 2024-09-04 DIAGNOSIS — F64.9 GENDER DYSPHORIA: ICD-10-CM

## 2024-09-04 DIAGNOSIS — F52.31 ANORGASMIA OF FEMALE: Primary | ICD-10-CM

## 2024-09-04 PROCEDURE — 3079F DIAST BP 80-89 MM HG: CPT | Performed by: NURSE PRACTITIONER

## 2024-09-04 PROCEDURE — 3008F BODY MASS INDEX DOCD: CPT | Performed by: NURSE PRACTITIONER

## 2024-09-04 PROCEDURE — 99024 POSTOP FOLLOW-UP VISIT: CPT | Performed by: NURSE PRACTITIONER

## 2024-09-04 PROCEDURE — 3075F SYST BP GE 130 - 139MM HG: CPT | Performed by: NURSE PRACTITIONER

## 2024-09-04 RX ORDER — DOCUSATE SODIUM 100 MG/1
100 CAPSULE, LIQUID FILLED ORAL 2 TIMES DAILY
COMMUNITY

## 2024-09-04 NOTE — PROGRESS NOTES
GENDER CARE POST-OP     HISTORY OF PRESENT ILLNESS:   Ashley Valencia is a 59 y.o. trans woman s/p PPT vaginoplasty 10/3/23  Nephrolithiasis s/p ureteroscopy and B stent placement per Dr. Blake 9/26/23  Repeat ureteroscopy with L laser lithotripsy and ureteral stent placement; stents removed 11/30/23  Labioplasty, clitoral farmer revision & meatoplasty 6/27/24    INTERVAL HISTORY:  Returns today for POV  Seen unaccompanied  Uncertain that tadalafil is helping  May have had orgasm using vibrator  Videos recommended by therapist not beneficial  Notes significant loss of depth  Used to get to finger notches, now not even close  Sometimes missing or skipping dilation, feels like a chore    PAST MEDICAL HISTORY:  Past Medical History:   Diagnosis Date    Calculus of kidney 01/28/2019    Kidney stone on left side    Depression     Fibromyalgia     Gender dysphoria     HLD (hyperlipidemia)     HTN (hypertension)     IBS (irritable bowel syndrome)     Migraines     Personal history of colonic polyps 05/07/2021    Unspecified hydronephrosis 10/08/2020    Hydronephrosis, left       PAST SURGICAL HISTORY:  Past Surgical History:   Procedure Laterality Date    HEMORRHOID SURGERY  12/11/2018    Hemorrhoidectomy    OTHER SURGICAL HISTORY  12/11/2018    Colonoscopy        ALLERGIES:   No Known Allergies     MEDICATIONS:     Current Outpatient Medications:     alcohol swabs pads, medicated, , Disp: , Rfl:     allopurinol (Zyloprim) 100 mg tablet, Take 1 tablet (100 mg) by mouth once daily in the evening. Take with meals., Disp: 30 tablet, Rfl: 11    amitriptyline (Elavil) 25 mg tablet, Take 3 tablets (75 mg) by mouth once daily at bedtime., Disp: , Rfl:     ascorbic acid/collagen hydr (COLLAGEN SKIN RENEWAL ORAL), Take 1 Capful by mouth once daily with a meal., Disp: , Rfl:     BACILLUS COAGULANS-INULIN ORAL, Take 1 capsule by mouth once daily with a meal., Disp: , Rfl:     BD Luer-Orestes Syringe 1 mL syringe, , Disp: , Rfl:     BD  "Regular Bevel Needles 18 gauge x 1 1/2\" needle, , Disp: , Rfl:     BD SafetyGlide Syringe 3 mL 23 x 1\" syringe, , Disp: , Rfl:     cholecalciferol (Vitamin D-3) 50 mcg (2,000 unit) capsule, Take 1 capsule (50 mcg) by mouth once daily., Disp: , Rfl:     docusate sodium (Colace) 100 mg capsule, Take 1 capsule (100 mg) by mouth 2 times a day., Disp: , Rfl:     estradiol valerate (Delestrogen) 20 mg/mL injection, Inject 1 mL (20 mg) into the muscle 1 (one) time per week. On Thursday @ 8pm, Disp: , Rfl:     lidocaine-prilocaine (Emla) 2.5-2.5 % cream, Apply topically., Disp: , Rfl:     losartan (Cozaar) 50 mg tablet, Take 2 tablets (100 mg) by mouth once daily., Disp: , Rfl:     MAGNESIUM MAL, THREON, CHELATE ORAL, Take 144 mg by mouth once daily in the evening. Take with meals., Disp: , Rfl:     melatonin 10 mg tablet, Take 1 tablet (10 mg) by mouth as needed at bedtime., Disp: , Rfl:     MULTIVITAMIN ORAL, Take 1 tablet by mouth 2 times a day., Disp: , Rfl:     Nurtec ODT 75 mg tablet,disintegrating, Take 1 tablet (75 mg) by mouth once daily as needed., Disp: , Rfl:     onabotulinumtoxinA (Botox) 100 unit injection, Inject into the muscle 1 time., Disp: , Rfl:     oxyCODONE (Roxicodone) 5 mg immediate release tablet, Take 1 tablet (5 mg) by mouth every 6 hours if needed for severe pain (7 - 10) for up to 8 doses. Take one tablet every 6 hours as needed for pain not controlled by tylenol, Disp: 8 tablet, Rfl: 0    potassium citrate CR (Urocit-K-10) 10 mEq ER tablet, Take 1 tablet (10 mEq) by mouth 2 times a day., Disp: 60 tablet, Rfl: 11    progesterone (Prometrium) 200 mg capsule, Take 1 capsule (200 mg) by mouth once daily at bedtime., Disp: , Rfl:     sodium bicarbonate 650 mg tablet, Take 1 tablet (650 mg) by mouth 2 times a day. Before breakfast and at bedtime, Disp: , Rfl:     surgical lubricant (Surgilube) gel, USE ONE GRAM THREE TIMES A DAY FOR VAGINAL DILATION FOR THREE MONTHS, THEN AS INSTRUCTED., Disp: 240.98 " g, Rfl: 11    verapamil SR (Calan-SR) 180 mg ER tablet, Take 1 tablet (180 mg) by mouth once daily at bedtime. Do not crush or chew., Disp: , Rfl:     vitamin B complex (Vitamins B Complex) tablet, Take 1 tablet by mouth 2 times daily (morning and late afternoon)., Disp: , Rfl:     tadalafil (Cialis) 5 mg tablet, Take 1 tablet (5 mg) by mouth once daily as needed (sexual activity)., Disp: 10 tablet, Rfl: 0     SOCIAL HISTORY:  Patient  reports that she has never smoked. She has never used smokeless tobacco. She reports that she does not currently use alcohol. She reports that she does not use drugs.   Social History     Socioeconomic History    Marital status: Single     Spouse name: Not on file    Number of children: Not on file    Years of education: Not on file    Highest education level: Not on file   Occupational History    Not on file   Tobacco Use    Smoking status: Never    Smokeless tobacco: Never   Vaping Use    Vaping status: Never Used   Substance and Sexual Activity    Alcohol use: Not Currently    Drug use: Never    Sexual activity: Defer   Other Topics Concern    Not on file   Social History Narrative    Not on file     Social Determinants of Health     Financial Resource Strain: Low Risk  (1/10/2024)    Received from [employer]    Overall Financial Resource Strain (CARDIA)     Difficulty of Paying Living Expenses: Not hard at all   Food Insecurity: No Food Insecurity (1/10/2024)    Received from [employer]    Hunger Vital Sign     Worried About Running Out of Food in the Last Year: Never true     Ran Out of Food in the Last Year: Never true   Transportation Needs: No Transportation Needs (1/10/2024)    Received from [employer]    PRAPARE - Transportation     Lack of Transportation (Medical): No     Lack of Transportation (Non-Medical): No   Physical Activity: Inactive (1/10/2024)    Received from [employer]    Exercise Vital Sign     Days of  Exercise per Week: 0 days     Minutes of Exercise per Session: 0 min   Stress: No Stress Concern Present (1/10/2024)    Received from [employer]    [place] of Occupational Health - Occupational Stress Questionnaire     Feeling of Stress : Not at all   Social Connections: Moderately Isolated (1/10/2024)    Received from [employer]    Social Connection and Isolation Panel [NHANES]     Frequency of Communication with Friends and Family: More than three times a week     Frequency of Social Gatherings with Friends and Family: More than three times a week     Attends Anabaptist Services: Never     Active Member of Clubs or Organizations: Yes     Attends Club or Organization Meetings: More than 4 times per year     Marital Status: Never    Intimate Partner Violence: Not At Risk (1/10/2024)    Received from [employer]    Humiliation, Afraid, Rape, and Kick questionnaire     Fear of Current or Ex-Partner: No     Emotionally Abused: No     Physically Abused: No     Sexually Abused: No   Housing Stability: Low Risk  (1/10/2024)    Received from [employer]    Housing Stability Vital Sign     Unable to Pay for Housing in the Last Year: No     Number of Places Lived in the Last Year: 1     Unstable Housing in the Last Year: No       FAMILY HISTORY:  Family History   Problem Relation Name Age of Onset    Breast cancer Mother      Dementia Mother      Hypertension Mother      Carpal tunnel syndrome Mother      Osteoarthritis Mother      Diabetes Other      Arthritis Other      Cancer Other      Hypertension Other      Alcohol abuse Other         REVIEW OF SYSTEMS:  All systems reviewed, pertinent negatives as noted in HPI.    PHYSICAL EXAM:  Visit Vitals  /87   Pulse 73   Temp 36.2 °C (97.1 °F) (Temporal)     Constitutional: Well-developed and well-nourished. No distress.    Head: Normocephalic and atraumatic.    Neck: Normal range of motion.     Pulmonary/Chest: Effort normal. No respiratory distress.   Abdominal: Nondistended.  : See below.  Integumentary: No rash or lesions.  Musculoskeletal: Normal range of motion.    Neurological: Alert and oriented.  Psychiatric: Normal mood and affect. Thought content normal.      LABORATORY REVIEW:   Lab Results   Component Value Date    BUN 17 10/24/2023    CREATININE 1.00 10/24/2023    EGFR 88 10/24/2023     10/24/2023    K 3.7 10/24/2023     (H) 10/24/2023    CO2 21 10/24/2023    CALCIUM 8.2 (L) 10/24/2023      Lab Results   Component Value Date    WBC 8.7 10/24/2023    RBC 3.85 (L) 10/24/2023    HGB 11.2 (L) 10/24/2023    HCT 34.2 (L) 10/24/2023    MCV 89 10/24/2023    MCH 29.1 10/24/2023    MCHC 32.7 10/24/2023    RDW 13.5 10/24/2023     10/24/2023    MPV 9.6 10/24/2023               Assessment:     Encounter Diagnoses   Name Primary?    Anorgasmia of female Yes    Gender dysphoria        Ashley Valencia is a 59 y.o. trans woman s/p PPT vaginoplasty 10/3/23  Nephrolithiasis s/p ureteroscopy and B stent placement per Dr. Blake 9/26/23  Repeat ureteroscopy with L laser lithotripsy and ureteral stent placement; stents removed 11/30/23  Labioplasty, clitoral farmer revision & meatoplasty 6/27/24  Continues to have difficulty with erogenous sensation, ability to achieve orgasm  Not yet scheduled with pelvic floor PT    Plan:   Referral to Dr. Jorgensen available if needed  Reviewed dose range with tadalafil, may take 5-20 mg prn  Consider topical testosterone depending on response to tadalafil  Discussed alternative devices for stimulation, eg pelvic wand  Trial of silicone lube for dilation; not for use with silicone toys  RTC in 6-8 weeks for reassessment  Encouraged to call in the interim with any questions, concerns

## 2024-10-17 ENCOUNTER — APPOINTMENT (OUTPATIENT)
Dept: UROLOGY | Facility: CLINIC | Age: 59
End: 2024-10-17
Payer: COMMERCIAL

## 2024-10-17 VITALS — TEMPERATURE: 96.6 F | HEART RATE: 86 BPM | DIASTOLIC BLOOD PRESSURE: 76 MMHG | SYSTOLIC BLOOD PRESSURE: 119 MMHG

## 2024-10-17 DIAGNOSIS — R68.82: ICD-10-CM

## 2024-10-17 DIAGNOSIS — F52.31 ANORGASMIA OF FEMALE: Primary | ICD-10-CM

## 2024-10-17 PROCEDURE — 3074F SYST BP LT 130 MM HG: CPT | Performed by: NURSE PRACTITIONER

## 2024-10-17 PROCEDURE — 3078F DIAST BP <80 MM HG: CPT | Performed by: NURSE PRACTITIONER

## 2024-10-17 PROCEDURE — 99214 OFFICE O/P EST MOD 30 MIN: CPT | Performed by: NURSE PRACTITIONER

## 2024-10-17 RX ORDER — ATORVASTATIN CALCIUM 20 MG/1
1 TABLET, FILM COATED ORAL
COMMUNITY
Start: 2024-10-14

## 2024-10-17 RX ORDER — POLYETHYLENE GLYCOL 3350 17 G/17G
17 POWDER, FOR SOLUTION ORAL DAILY
COMMUNITY

## 2024-11-13 NOTE — PROGRESS NOTES
INITIAL EVALUATION       HISTORY OF PRESENT ILLNESS:   Ashley Valencia is a 59 y.o. adult who presents to me today for anorgasmia, referred to me by Denia Barrios CNP. She is s/p PPT vaginoplasty 10/3/23 and s/p labioplasty, clitoral farmer revision & meatoplasty 6/27/24.      Patient states that orgasm has been difficult to attain since starting HRT in 2020, but she was still able to achieve orgasm prior to her PPT in 2023. She has not been able to have an orgasm since her PPT in 2023 but has gotten close. She also states that knowing how to masturbate is difficult and she is still figuring it out. She states she knows the erogenous zones including the clitoris, near the clitoris, and the prostate. She knows these places are more sensitive but not sensitive enough, comparing it to touching other parts of her body. She has lost some depth but is still able to use the largest dilator.     PAST MEDICAL HISTORY:  Past Medical History:   Diagnosis Date    Calculus of kidney 01/28/2019    Kidney stone on left side    Depression     Fibromyalgia     Gender dysphoria     HLD (hyperlipidemia)     HTN (hypertension)     IBS (irritable bowel syndrome)     Migraines     Personal history of colonic polyps 05/07/2021    Unspecified hydronephrosis 10/08/2020    Hydronephrosis, left       PAST SURGICAL HISTORY:  Past Surgical History:   Procedure Laterality Date    HEMORRHOID SURGERY  12/11/2018    Hemorrhoidectomy    OTHER SURGICAL HISTORY  12/11/2018    Colonoscopy        ALLERGIES:   No Known Allergies     MEDICATIONS:   Medication Documentation Review Audit       Reviewed by Colby Alvarenga on 11/14/24 at 1256      Medication Order Taking? Sig Documenting Provider Last Dose Status   acetaminophen (Tylenol) 325 mg tablet [id number]  Take 2 tablets (650 mg) by mouth. Historical Provider, MD  Active   alcohol swabs pads, medicated [id number]   Historical Provider, MD  Active   allopurinol (Zyloprim) 100 mg tablet [id number]   Take 1 tablet (100 mg) by mouth once daily in the evening. Take with meals. Alexa Blake MD  Active   amitriptyline (Elavil) 25 mg tablet [id number]  Take 3 tablets (75 mg) by mouth once daily at bedtime. Vinh Fernando MD  Active   atorvastatin (Lipitor) 20 mg tablet [id number]  Take 1 tablet (20 mg) by mouth early in the morning.. Vinh Fernando MD  Active   cholecalciferol (Vitamin D-3) 50 mcg (2,000 unit) capsule [id number]  Take 1 capsule (50 mcg) by mouth once daily.   Patient not taking: Reported on 10/17/2024    Vinh Fernando MD  Active   estradiol valerate (Delestrogen) 20 mg/mL injection [id number]  Inject 1 mL (20 mg) into the muscle 1 (one) time per week. On Thursday @ 8pm Vinh Fernando MD  Active   ibuprofen 600 mg tablet [id number] Yes  Vinh Fernando MD  Active   losartan (Cozaar) 50 mg tablet [id number]  Take 2 tablets (100 mg) by mouth once daily. Vinh Fernando MD  Active   MAGNESIUM MAL, THREON, CHELATE ORAL [id number]  Take 144 mg by mouth once daily in the evening. Take with meals. Historical MD Abdullahi  Active   melatonin 10 mg tablet [id number]  Take 1 tablet (10 mg) by mouth as needed at bedtime. Vinh Fernando MD  Active   MULTIVITAMIN ORAL [id number]  Take 1 tablet by mouth 2 times a day. Vinh Fernando MD  Active   Nurtec ODT 75 mg tablet,disintegrating [id number]  Take 1 tablet (75 mg) by mouth once daily as needed. Vinh Fernando MD  Active   onabotulinumtoxinA (Botox) 100 unit injection [id number]  Inject into the muscle 1 time. Vinh Fernando MD  Active   polyethylene glycol (Glycolax, Miralax) 17 gram packet [id number]  Take 17 g by mouth once daily. Vinh Fernando MD  Active   potassium citrate CR (Urocit-K-10) 10 mEq ER tablet [id number]  Take 1 tablet (10 mEq) by mouth 2 times a day. Alexa Blake MD  Active   progesterone (Prometrium) 200 mg capsule [id number]  Take 1 capsule (200 mg) by mouth once daily at bedtime.  Historical Provider, MD  Active   psyllium (Metamucil) 3.4 gram packet [id number]  Take 1 packet by mouth once daily. Historical Provider, MD  Active   sodium bicarbonate 650 mg tablet [id number]  Take 1 tablet (650 mg) by mouth 2 times a day. Before breakfast and at bedtime Historical Provider, MD  Active   surgical lubricant (Surgilube) gel [id number]  USE ONE GRAM THREE TIMES A DAY FOR VAGINAL DILATION FOR THREE MONTHS, THEN AS INSTRUCTED. SINGH Worthy  Active   tadalafil (Cialis) 5 mg tablet [id number]  Take 1 tablet (5 mg) by mouth once daily as needed (sexual activity). SINGH Worthy   24 2359   verapamil SR (Calan-SR) 180 mg ER tablet [id number]  Take 1 tablet (180 mg) by mouth once daily at bedtime. Do not crush or chew. Historical Provider, MD  Active   vitamin B complex (Vitamins B Complex) tablet [id number]  Take 1 tablet by mouth 2 times daily (morning and late afternoon). Historical Provider, MD  Active                     SOCIAL HISTORY:  Patient  reports that she has never smoked. She has never used smokeless tobacco. She reports that she does not currently use alcohol. She reports that she does not use drugs.   Social History     Socioeconomic History    Marital status: Single     Spouse name: Not on file    Number of children: Not on file    Years of education: Not on file    Highest education level: Not on file   Occupational History    Not on file   Tobacco Use    Smoking status: Never    Smokeless tobacco: Never   Vaping Use    Vaping status: Never Used   Substance and Sexual Activity    Alcohol use: Not Currently    Drug use: Never    Sexual activity: Defer   Other Topics Concern    Not on file   Social History Narrative    Not on file     Social Drivers of Health     Financial Resource Strain: Low Risk  (1/10/2024)    [facility] [employer]    Overall Financial Resource Strain (CARDIA)     Difficulty of Paying Living Expenses: Not hard at  all   Food Insecurity: No Food Insecurity (1/10/2024)    Received from [employer]    Hunger Vital Sign     Worried About Running Out of Food in the Last Year: Never true     Ran Out of Food in the Last Year: Never true   Transportation Needs: No Transportation Needs (1/10/2024)    Received from [employer]    PRAPARE - Transportation     Lack of Transportation (Medical): No     Lack of Transportation (Non-Medical): No   Physical Activity: Inactive (1/10/2024)    Received from [employer]    Exercise Vital Sign     Days of Exercise per Week: 0 days     Minutes of Exercise per Session: 0 min   Stress: No Stress Concern Present (1/10/2024)    Received from [employer]    [place] of Occupational Health - Occupational Stress Questionnaire     Feeling of Stress : Not at all   Social Connections: Moderately Isolated (1/10/2024)    Received from [employer]    Social Connection and Isolation Panel [NHANES]     Frequency of Communication with Friends and Family: More than three times a week     Frequency of Social Gatherings with Friends and Family: More than three times a week     Attends Scientology Services: Never     Active Member of Clubs or Organizations: Yes     Attends Club or Organization Meetings: More than 4 times per year     Marital Status: Never    Intimate Partner Violence: Not At Risk (1/10/2024)    Received from [employer]    Humiliation, Afraid, Rape, and Kick questionnaire     Fear of Current or Ex-Partner: No     Emotionally Abused: No     Physically Abused: No     Sexually Abused: No   Housing Stability: Low Risk  (1/10/2024)    Received from [employer]    Housing Stability Vital Sign     Unable to Pay for Housing in the Last Year: No     Number of Places Lived in the Last Year: 1     Unstable Housing in the Last Year: No       FAMILY HISTORY:  Family History   Problem Relation Name Age of Onset     Breast cancer Mother      Dementia Mother      Hypertension Mother      Carpal tunnel syndrome Mother      Osteoarthritis Mother      Diabetes Other      Arthritis Other      Cancer Other      Hypertension Other      Alcohol abuse Other         REVIEW OF SYSTEMS:  Constitutional: Negative for fever and chills. Denies anorexia, weight loss.  Eyes: Negative for visual disturbance.   Respiratory: Negative for shortness of breath.    Cardiovascular: Negative for chest pain.   Gastrointestinal: Negative for nausea and vomiting.   Genitourinary: See interval history above.  Skin: Negative for rash.   Neurological: Negative for dizziness and numbness.   Psychiatric/Behavioral: Negative for confusion and decreased concentration.     PHYSICAL EXAM:  Blood pressure 150/89, pulse 88, temperature 36.7 °C (98.1 °F), height 1.829 m (6'), weight 90.8 kg (200 lb 3.2 oz).  Constitutional: Patient appears well-developed and well-nourished. No distress.    Head: Normocephalic and atraumatic.    Neck: Normal range of motion.    Cardiovascular: Normal rate.    Pulmonary/Chest: Effort normal. No respiratory distress.   : normal neoclitoris and clitoral farmer, no bands of tissue viualized, normal neovagina  Musculoskeletal: Normal range of motion.    Neurological: Alert and oriented to person, place, and time.  Psychiatric: Normal mood and affect. Behavior is normal. Thought content normal.       Assessment:      1. Gender dysphoria            Ashley Valencia is a 59 y.o. adult with anorgasmia post gender affirming vaginoplasty     Plan:    Anorgasmia  Discussed vyleesi and testosterone gel as options, patient would like to try both  Has testosterone level from last week, too low to calculate, will obtain testosterone level in 3 months after once weekly one pump of testosterone gel  Discussed vyleesi usage PRN  FUV in 3 months    Beena Scherer, PGY4 OBGYN  Seen and discussed with Dr. Pope Jim Attestation  By signing my name  below, I, Diandra Wilson   attest that this documentation has been prepared under the direction and in the presence of Darcy Ross MD MPH.      Agree with above  Darcy Ross MD, MPH

## 2024-11-14 ENCOUNTER — APPOINTMENT (OUTPATIENT)
Dept: UROLOGY | Facility: CLINIC | Age: 59
End: 2024-11-14
Payer: COMMERCIAL

## 2024-11-14 VITALS
SYSTOLIC BLOOD PRESSURE: 150 MMHG | WEIGHT: 200.2 LBS | HEART RATE: 88 BPM | TEMPERATURE: 98.1 F | BODY MASS INDEX: 27.12 KG/M2 | HEIGHT: 72 IN | DIASTOLIC BLOOD PRESSURE: 89 MMHG

## 2024-11-14 DIAGNOSIS — F52.0 HYPOACTIVE SEXUAL DESIRE DISORDER: ICD-10-CM

## 2024-11-14 DIAGNOSIS — F52.31 ANORGASMIA OF FEMALE: ICD-10-CM

## 2024-11-14 DIAGNOSIS — F64.9 GENDER DYSPHORIA: ICD-10-CM

## 2024-11-14 PROCEDURE — 99214 OFFICE O/P EST MOD 30 MIN: CPT | Performed by: OBSTETRICS & GYNECOLOGY

## 2024-11-14 PROCEDURE — 1036F TOBACCO NON-USER: CPT | Performed by: OBSTETRICS & GYNECOLOGY

## 2024-11-14 PROCEDURE — 3079F DIAST BP 80-89 MM HG: CPT | Performed by: OBSTETRICS & GYNECOLOGY

## 2024-11-14 PROCEDURE — 3077F SYST BP >= 140 MM HG: CPT | Performed by: OBSTETRICS & GYNECOLOGY

## 2024-11-14 PROCEDURE — 3008F BODY MASS INDEX DOCD: CPT | Performed by: OBSTETRICS & GYNECOLOGY

## 2024-11-14 RX ORDER — BREMELANOTIDE 1.75 MG/.3ML
0.3 INJECTION SUBCUTANEOUS AS NEEDED
Qty: 4 EACH | Refills: 3 | Status: SHIPPED | OUTPATIENT
Start: 2024-11-14

## 2024-11-14 RX ORDER — ACETAMINOPHEN 325 MG/1
650 TABLET ORAL
COMMUNITY
Start: 2024-11-04 | End: 2024-11-14

## 2024-11-14 RX ORDER — ONDANSETRON 4 MG/1
4 TABLET, ORALLY DISINTEGRATING ORAL EVERY 8 HOURS PRN
Qty: 30 TABLET | Refills: 0 | Status: SHIPPED | OUTPATIENT
Start: 2024-11-14 | End: 2024-12-14

## 2024-11-14 RX ORDER — IBUPROFEN 600 MG/1
TABLET ORAL
COMMUNITY
Start: 2024-11-04

## 2024-11-14 RX ORDER — TESTOSTERONE 20.25 MG/1.25G
GEL TOPICAL
Qty: 75 G | Refills: 3 | Status: SHIPPED | OUTPATIENT
Start: 2024-11-14

## 2024-11-14 ASSESSMENT — PAIN SCALES - GENERAL: PAINLEVEL_OUTOF10: 4

## 2024-11-27 ENCOUNTER — APPOINTMENT (OUTPATIENT)
Dept: BEHAVIORAL HEALTH | Facility: CLINIC | Age: 59
End: 2024-11-27
Payer: COMMERCIAL

## 2024-11-27 DIAGNOSIS — F41.8 ANXIETY ABOUT HEALTH: Primary | ICD-10-CM

## 2024-11-27 PROCEDURE — 90791 PSYCH DIAGNOSTIC EVALUATION: CPT | Performed by: PSYCHOLOGIST

## 2024-11-27 NOTE — LETTER
"November 27, 2024     Darcy Ross MD MPH  [id number]  [facility] [address]    Patient: Ashley Valencia   YOB: 1965   Date of Visit: 11/27/2024       Dear Dr. Darcy Ross MD MPH:    Thank you for referring Ashley Valencia to me for evaluation. Below are my notes for this consultation.  If you have questions, please do not hesitate to call me. I look forward to following your patient along with you.       Sincerely,     Christiane Jorgensen, PhD      CC: No Recipients  ______________________________________________________________________________________    Initial Assessment  Virtual visit with audio and visual equipment  POS10  No falls, tobacco use, SI    [place], [place] is a transwoman who had gender confirming surgery in 2023 and is now unable to reach orgasm.  She reports that even prior to her penile inversion she had lost most of her ability to be aroused and orgasm with starting hormones -estradiol and progestogen. She has also lost a great deal of sensation in her neovagina and clitoris but does state there is some.  She described prior attempts to reach orgasm with vibrators. She feels uneducated about how women typically reach orgasm. I explained that many women born with a clitoris remain unaware of how best to feel stimulation and reach orgasm.  We reviewed best ways to learn where her erotic sensations are best felt. We also reviewed the tendency to stimulate areas too fast leading to a \"short circuit\" and now she has developed anticipatory failure.  I altered her approach to this with the journey not the arrival as the goal. I suggested she purchase a magic wand with reostat and theturoboglider and use a pendulum approach to building sensation.  She is to return as needed.  "

## 2024-11-27 NOTE — PROGRESS NOTES
"Initial Assessment  Virtual visit with audio and visual equipment  POS10  No falls, tobacco use, DENISE Pizano, 59 is a transwoman who had gender confirming surgery in 2023 and is now unable to reach orgasm.  She reports that even prior to her penile inversion she had lost most of her ability to be aroused and orgasm with starting hormones -estradiol and progestogen. She has also lost a great deal of sensation in her neovagina and clitoris but does state there is some.  She described prior attempts to reach orgasm with vibrators. She feels uneducated about how women typically reach orgasm. I explained that many women born with a clitoris remain unaware of how best to feel stimulation and reach orgasm.  We reviewed best ways to learn where her erotic sensations are best felt. We also reviewed the tendency to stimulate areas too fast leading to a \"short circuit\" and now she has developed anticipatory failure.  I altered her approach to this with the journey not the arrival as the goal. I suggested she purchase a magic wand with reostat and theturoboglider and use a pendulum approach to building sensation.  She is to return as needed.  "

## 2025-02-03 LAB — TESTOST SERPL-MCNC: 10 NG/DL (ref 2–45)

## 2025-02-25 NOTE — PROGRESS NOTES
Ashley Valencia is a 58 y.o. female on day 1 of admission presenting with Gender identity disorder, unspecified.  Patient reports ADOD 10/9, but reports no anticiapted needs at this time. Currently patient has a wound vac to cont. suction. Care Transitions will continue to follow during course of hospital stay for any changes to discharge needs. [facility] 24  Valarie Crowder RN       No age appropriate assistance

## 2025-03-18 DIAGNOSIS — F52.31 ANORGASMIA OF FEMALE: ICD-10-CM

## 2025-03-18 DIAGNOSIS — F52.0 HYPOACTIVE SEXUAL DESIRE DISORDER: ICD-10-CM

## 2025-03-26 LAB — TESTOST SERPL-MCNC: 22 NG/DL (ref 2–45)

## 2025-03-27 NOTE — PROGRESS NOTES
FOLLOW-UP VISIT       HISTORY OF PRESENT ILLNESS:   Ashley Valencia is a 59 y.o. adult who presents to me today for follow-up of anorgasmia and HSDD. At the patient's previous visit on 11/14/24, she was started on Vyleesi and testosterone gel for HSDD.     Today the patient reports she has been using the testosterone gel twice a week. States that she initially found this to be beneficial, however, has noticed a plateau. She saw Dr. Jorgensen who recommended the patient use a stronger vibrator. Regarding Vyleesi, the patient reports this to provide the same results as the testosterone gel.     PAST MEDICAL HISTORY:  Past Medical History:   Diagnosis Date    Calculus of kidney 01/28/2019    Kidney stone on left side    Depression     Fibromyalgia     Gender dysphoria     HLD (hyperlipidemia)     HTN (hypertension)     IBS (irritable bowel syndrome)     Migraines     Personal history of colonic polyps 05/07/2021    Unspecified hydronephrosis 10/08/2020    Hydronephrosis, left       PAST SURGICAL HISTORY:  Past Surgical History:   Procedure Laterality Date    HEMORRHOID SURGERY  12/11/2018    Hemorrhoidectomy    OTHER SURGICAL HISTORY  12/11/2018    Colonoscopy        ALLERGIES:   No Known Allergies     MEDICATIONS:   Medication Documentation Review Audit       Reviewed by Cheyenne Cha MA (Medical Assistant) on 04/04/25 at 0844      Medication Order Taking? Sig Documenting Provider Last Dose Status   Ajovy Syringe 225 mg/1.5 mL prefilled syringe [id number] Yes  Historical Provider, MD  Active   alcohol swabs pads, medicated [id number] Yes  Historical Provider, MD  Active   allopurinol (Zyloprim) 100 mg tablet [id number] Yes Take 1 tablet (100 mg) by mouth once daily in the evening. Take with meals. Alexa Blake MD  Active   amitriptyline (Elavil) 25 mg tablet [id number] Yes Take 3 tablets (75 mg) by mouth once daily at bedtime. Historical Provider, MD  Active   atorvastatin (Lipitor) 20 mg tablet [id number] Yes Take  "1 tablet (20 mg) by mouth early in the morning.. Historical Provider, MD  Active   BD Luer-Orestes Syringe 3 mL 23 x 1\" syringe [id number] Yes  Historical Provider, MD  Active   blunt needle, disposable 18 x 1 1/2 \" needle [id number] Yes  Historical Provider, MD  Active   bremelanotide (Vyleesi) 1.75 mg/0.3 mL auto-injector [id number] Yes Inject 0.3 mL under the skin if needed (one hour prior to sexual intercourse). Darcy Ross MD MPH  Active   cholecalciferol (Vitamin D-3) 50 mcg (2,000 unit) capsule [id number] Yes Take 1 capsule (50 mcg) by mouth once daily. Historical Provider, MD  Active   cyproheptadine (Periactin) 4 mg tablet [id number] Yes Take 1 tablet (4 mg) by mouth once daily at bedtime. Historical Provider, MD  Active   estradiol valerate (Delestrogen) 20 mg/mL injection [id number] Yes Inject 1 mL (20 mg) into the muscle 1 (one) time per week. On Thursday @ 8pm Historical Provider, MD  Active   ibuprofen 600 mg tablet [id number] Yes  Historical Provider, MD  Active   losartan (Cozaar) 100 mg tablet [id number] Yes Take 1 tablet (100 mg) by mouth early in the morning.. Historical Provider, MD  Active   losartan (Cozaar) 50 mg tablet [id number]  Take 2 tablets (100 mg) by mouth once daily.   Patient not taking: Reported on 4/4/2025    Historical Provider, MD  Active   MAGNESIUM MAL, THREON, CHELATE ORAL [id number] Yes Take 144 mg by mouth once daily in the evening. Take with meals. Historical Provider, MD  Active   melatonin 10 mg tablet [id number] Yes Take 1 tablet (10 mg) by mouth as needed at bedtime. Historical Provider, MD  Active   MULTIVITAMIN ORAL [id number] Yes Take 1 tablet by mouth 2 times a day. Historical Provider, MD  Active   Nurtec ODT 75 mg tablet,disintegrating [id number] Yes Dissolve 1 tablet (75 mg) in the mouth once daily as needed. Historical Provider, MD  Active   onabotulinumtoxinA (Botox) 100 unit injection [id number] Yes Inject into the muscle 1 time. Historical Provider, MD  Active   polyethylene " glycol (Glycolax, Miralax) 17 gram packet [id number] Yes Take 17 g by mouth once daily. Historical Provider, MD  Active   polyethylene glycol (GoLYTELY) 236-22.74-6.74 -5.86 gram solution [id number] Yes use as directed prior to colonoscopy Historical Provider, MD  Active   potassium citrate CR (Urocit-K-10) 10 mEq ER tablet [id number] Yes Take 1 tablet (10 mEq) by mouth 2 times a day. Alexa Blake MD  Active   progesterone (Prometrium) 200 mg capsule [id number] Yes Take 1 capsule (200 mg) by mouth once daily at bedtime. Historical Provider, MD  Active   psyllium (Metamucil) 3.4 gram packet [id number] Yes Take 1 packet by mouth once daily. Historical Provider, MD  Active   sodium bicarbonate 650 mg tablet [id number] Yes Take 1 tablet (650 mg) by mouth 2 times a day. Before breakfast and at bedtime Historical Provider, MD  Active   surgical lubricant (Surgilube) gel [id number] Yes USE ONE GRAM THREE TIMES A DAY FOR VAGINAL DILATION FOR THREE MONTHS, THEN AS INSTRUCTED. Denia Barrios, APRN-CNP  Active   tadalafil (Cialis) 5 mg tablet [id number]  Take 1 tablet (5 mg) by mouth once daily as needed (sexual activity). Denia Barrios, APRN-CNP   24 2359   testosterone 20.25 mg/1.25 gram (1.62 %) gel in metered-dose pump [id number] Yes Apply one pump once weekly Darcy Ross MD MPH  Active   verapamil SR (Calan-SR) 180 mg ER tablet [id number] Yes Take 1 tablet (180 mg) by mouth once daily at bedtime. Do not crush or chew. Historical Provider, MD  Active   vitamin B complex (Vitamins B Complex) tablet [id number] Yes Take 1 tablet by mouth 2 times daily (morning and late afternoon). Historical Provider, MD  Active                     SOCIAL HISTORY:  Patient  reports that she has never smoked. She has never used smokeless tobacco. She reports that she does not currently use alcohol. She reports that she does not use drugs.   Social History     Socioeconomic History    Marital status: Single     Spouse name: Not  on file    Number of children: Not on file    Years of education: Not on file    Highest education level: Not on file   Occupational History    Not on file   Tobacco Use    Smoking status: Never    Smokeless tobacco: Never   Vaping Use    Vaping status: Never Used   Substance and Sexual Activity    Alcohol use: Not Currently    Drug use: Never    Sexual activity: Defer   Other Topics Concern    Not on file   Social History Narrative    Not on file     Social Drivers of Health     Financial Resource Strain: Low Risk  (1/13/2025)    Received from [employer]    Overall Financial Resource Strain (CARDIA)     Difficulty of Paying Living Expenses: Not hard at all   Food Insecurity: No Food Insecurity (1/13/2025)    Received from [employer]    Hunger Vital Sign     Worried About Running Out of Food in the Last Year: Never true     Ran Out of Food in the Last Year: Never true   Transportation Needs: No Transportation Needs (1/13/2025)    Received from [employer]    PRAPARE - Transportation     Lack of Transportation (Medical): No     Lack of Transportation (Non-Medical): No   Physical Activity: Insufficiently Active (1/13/2025)    Received from [employer]    Exercise Vital Sign     Days of Exercise per Week: 1 day     Minutes of Exercise per Session: 30 min   Stress: No Stress Concern Present (1/13/2025)    Received from [employer]    [place] of Occupational Health - Occupational Stress Questionnaire     Feeling of Stress : Not at all   Social Connections: Moderately Isolated (1/13/2025)    Received from [employer]    Social Connection and Isolation Panel [NHANES]     Frequency of Communication with Friends and Family: More than three times a week     Frequency of Social Gatherings with Friends and Family: More than three times a week     Attends Pentecostal Services: Never     Active Member of Clubs or Organizations: Yes     Attends Club or Organization Meetings: More than 4 times per year     Marital Status:  Never    Intimate Partner Violence: Not At Risk (1/13/2025)    Received from [employer]    Humiliation, Afraid, Rape, and Kick questionnaire     Fear of Current or Ex-Partner: No     Emotionally Abused: No     Physically Abused: No     Sexually Abused: No   Housing Stability: Low Risk  (1/13/2025)    Received from [employer]    Housing Stability Vital Sign     Unable to Pay for Housing in the Last Year: No     Number of Times Moved in the Last Year: 0     Homeless in the Last Year: No       FAMILY HISTORY:  Family History   Problem Relation Name Age of Onset    Breast cancer Mother      Dementia Mother      Hypertension Mother      Carpal tunnel syndrome Mother      Osteoarthritis Mother      Diabetes Other      Arthritis Other      Cancer Other      Hypertension Other      Alcohol abuse Other         REVIEW OF SYSTEMS:  Constitutional: Negative for fever and chills. Denies anorexia, weight loss.  Eyes: Negative for visual disturbance.   Respiratory: Negative for shortness of breath.    Cardiovascular: Negative for chest pain.   Gastrointestinal: Negative for nausea and vomiting.   Genitourinary: See interval history above.  Skin: Negative for rash.   Neurological: Negative for dizziness and numbness.   Psychiatric/Behavioral: Negative for confusion and decreased concentration.     PHYSICAL EXAM:  Blood pressure 124/78, pulse 80, temperature 36.6 °C (97.8 °F), temperature source Temporal, weight 84.8 kg (187 lb).  Constitutional: Patient appears well-developed and well-nourished. No distress.    Head: Normocephalic and atraumatic.    Neck: Normal range of motion.    Cardiovascular: Normal rate.    Pulmonary/Chest: Effort normal. No respiratory distress.   Musculoskeletal: Normal range of motion.    Neurological: Alert and oriented to person, place, and time.  Psychiatric: Normal mood and affect. Behavior is normal. Thought content normal.      LAB REVIEW:  Component      Latest Ref Rng 3/20/2025    TESTOSTERONE, TOTAL, MS      2 - 45 ng/dL 22       Assessment:      No diagnosis found.    Ashley Valencia is a 59 y.o. adult with anorgasmia and HSDD.     We discussed increasing the patient's testosterone dose to 3 times a week to see if this provides benefit for the patient. We also dicussed a referral to Dr. Busby in psychology for other techniques. Patient verbalized understanding and will follow-up in 3 months.      Plan:   Referral to Dr. Busby for HSDD.   Repeat Testosterone level in 6-12 weeks  Follow-up in 3 months.       Darcy Ross MD MPH    Scribe Attestation  By signing my name below, I Savanah Densone, Scribe   attest that this documentation has been prepared under the direction and in the presence of Darcy Ross MD MPH.

## 2025-04-04 ENCOUNTER — APPOINTMENT (OUTPATIENT)
Dept: UROLOGY | Facility: CLINIC | Age: 60
End: 2025-04-04
Payer: COMMERCIAL

## 2025-04-04 VITALS
SYSTOLIC BLOOD PRESSURE: 124 MMHG | WEIGHT: 187 LBS | TEMPERATURE: 97.8 F | HEART RATE: 80 BPM | DIASTOLIC BLOOD PRESSURE: 78 MMHG | BODY MASS INDEX: 25.36 KG/M2

## 2025-04-04 DIAGNOSIS — F64.9 GENDER DYSPHORIA: Primary | ICD-10-CM

## 2025-04-04 PROCEDURE — 99213 OFFICE O/P EST LOW 20 MIN: CPT | Performed by: OBSTETRICS & GYNECOLOGY

## 2025-04-04 PROCEDURE — G2211 COMPLEX E/M VISIT ADD ON: HCPCS | Performed by: OBSTETRICS & GYNECOLOGY

## 2025-04-04 PROCEDURE — 3078F DIAST BP <80 MM HG: CPT | Performed by: OBSTETRICS & GYNECOLOGY

## 2025-04-04 PROCEDURE — 3074F SYST BP LT 130 MM HG: CPT | Performed by: OBSTETRICS & GYNECOLOGY

## 2025-04-04 RX ORDER — LOSARTAN POTASSIUM 100 MG/1
1 TABLET ORAL
COMMUNITY
Start: 2025-03-11

## 2025-04-04 RX ORDER — CYPROHEPTADINE HYDROCHLORIDE 4 MG/1
1 TABLET ORAL NIGHTLY
COMMUNITY
Start: 2025-03-17 | End: 2026-03-17

## 2025-04-04 RX ORDER — FREMANEZUMAB-VFRM 225 MG/1.5ML
INJECTION SUBCUTANEOUS
COMMUNITY
Start: 2024-04-15

## 2025-04-04 RX ORDER — NEEDLES, DISPOSABLE 25GX5/8"
NEEDLE, DISPOSABLE MISCELLANEOUS
COMMUNITY
Start: 2025-03-24

## 2025-04-04 RX ORDER — POLYETHYLENE GLYCOL 3350, SODIUM SULFATE ANHYDROUS, SODIUM BICARBONATE, SODIUM CHLORIDE, POTASSIUM CHLORIDE 236; 22.74; 6.74; 5.86; 2.97 G/4L; G/4L; G/4L; G/4L; G/4L
POWDER, FOR SOLUTION ORAL
COMMUNITY
Start: 2024-10-23 | End: 2025-04-08

## 2025-04-04 RX ORDER — SYRINGE WITH NEEDLE, 1 ML 25GX5/8"
SYRINGE, EMPTY DISPOSABLE MISCELLANEOUS
COMMUNITY
Start: 2025-03-24

## 2025-07-24 DIAGNOSIS — F52.0 HYPOACTIVE SEXUAL DESIRE DISORDER: ICD-10-CM

## 2025-08-01 ENCOUNTER — APPOINTMENT (OUTPATIENT)
Dept: UROLOGY | Facility: CLINIC | Age: 60
End: 2025-08-01
Payer: COMMERCIAL

## 2025-08-01 VITALS — TEMPERATURE: 96.9 F | SYSTOLIC BLOOD PRESSURE: 114 MMHG | DIASTOLIC BLOOD PRESSURE: 71 MMHG | HEART RATE: 73 BPM

## 2025-08-01 DIAGNOSIS — F52.0 HYPOACTIVE SEXUAL DESIRE DISORDER: ICD-10-CM

## 2025-08-01 DIAGNOSIS — F64.9 GENDER DYSPHORIA: Primary | ICD-10-CM

## 2025-08-01 DIAGNOSIS — F52.31 ANORGASMIA OF FEMALE: ICD-10-CM

## 2025-08-01 PROCEDURE — 3078F DIAST BP <80 MM HG: CPT | Performed by: OBSTETRICS & GYNECOLOGY

## 2025-08-01 PROCEDURE — 3074F SYST BP LT 130 MM HG: CPT | Performed by: OBSTETRICS & GYNECOLOGY

## 2025-08-01 PROCEDURE — 99214 OFFICE O/P EST MOD 30 MIN: CPT | Performed by: OBSTETRICS & GYNECOLOGY

## 2025-08-01 RX ORDER — DOXYCYCLINE 100 MG/1
100 CAPSULE ORAL 2 TIMES DAILY
COMMUNITY
Start: 2025-07-28

## 2025-08-01 RX ORDER — OXYCODONE AND ACETAMINOPHEN 5; 325 MG/1; MG/1
1 TABLET ORAL EVERY 6 HOURS PRN
COMMUNITY
Start: 2025-07-29 | End: 2025-08-05

## 2025-08-01 NOTE — PROGRESS NOTES
"FOLLOW-UP VISIT       HISTORY OF PRESENT ILLNESS:   Ashley Valencia is a 60 y.o. transgender female (she/her) presenting to me today for follow-up of anorgasmia and HSDD. Patient was last evaluated on 4/4/25. At that visit the patient was using testosterone gel twice a week which initially provided benefit, however she was starting to plateau. She also saw Dr. Jorgensen who recommended the patient use a stronger vibrator. Plan was to have the patient use testosterone gel 3 times a week, refer the patient to Dr. Busby for HSDD, and follow-up in 3 months. Total testosterone from 5/15/25 was 146. Estradiol from 5/15/25 was 214.     Today the patient reports some sensation from her clitoris, however she is unsure if this is \"pleasurable\".  She did not make an appointment with Dr. Busby due to this slipping her mind. Reports Dr. Blake decreased the dose of her testosterone treatment due to an increase in hair growth. She is now using testosterone twice a week.     Reports she was diagnosed with kidney stones, will be having a ureteroscopy with laser lithotripsy with Dr. Blake on 8/5/25.     PAST MEDICAL HISTORY:  Medical History[1]    PAST SURGICAL HISTORY:  Surgical History[2]     ALLERGIES:   Allergies[3]     MEDICATIONS:   Medication Documentation Review Audit       Reviewed by Manjula Ta MA (Medical Assistant) on 08/01/25 at 1525      Medication Order Taking? Sig Documenting Provider Last Dose Status   Ajovy Syringe 225 mg/1.5 mL prefilled syringe [id number] Yes  Historical Provider, MD  Active   alcohol swabs pads, medicated [id number] Yes  Historical Provider, MD  Active   allopurinol (Zyloprim) 100 mg tablet [id number] Yes Take 1 tablet (100 mg) by mouth once daily in the evening. Take with meals. Alexa Blake MD  Active   amitriptyline (Elavil) 25 mg tablet [id number] Yes Take 3 tablets (75 mg) by mouth once daily at bedtime. Historical Provider, MD  Active   atorvastatin (Lipitor) 20 mg tablet [id number] " "Yes Take 1 tablet (20 mg) by mouth early in the morning.. Historical Provider, MD  Active   BD Luer-Orestes Syringe 3 mL 23 x 1\" syringe [id number] Yes  Historical Provider, MD  Active   blunt needle, disposable 18 x 1 1/2 \" needle [id number] Yes  Historical Provider, MD  Active   bremelanotide (Vyleesi) 1.75 mg/0.3 mL auto-injector [id number] Yes Inject 0.3 mL under the skin if needed (one hour prior to sexual intercourse). Darcy Ross MD [facility]  Active   cholecalciferol (Vitamin D-3) 50 mcg (2,000 unit) capsule [id number] Yes Take 1 capsule (50 mcg) by mouth once daily. Historical Provider, MD  Active   cyproheptadine (Periactin) 4 mg tablet [id number] Yes Take 1 tablet (4 mg) by mouth once daily at bedtime. Historical Provider, MD  Active   doxycycline (Vibramycin) 100 mg capsule [id number] Yes Take 1 capsule (100 mg) by mouth twice a day. Historical Provider, MD  Active   estradiol valerate (Delestrogen) 20 mg/mL injection [id number] Yes Inject 1 mL (20 mg) into the muscle 1 (one) time per week. On Thursday @ 8pm Historical Provider, MD  Active   ibuprofen 600 mg tablet [id number] Yes  Historical Provider, MD  Active   losartan (Cozaar) 100 mg tablet [id number] Yes Take 1 tablet (100 mg) by mouth early in the morning.. Vinh Fernando MD  Active   losartan (Cozaar) 50 mg tablet [id number] Yes Take 2 tablets (100 mg) by mouth once daily. Historical Provider, MD  Active   MAGNESIUM MAL, THREON, CHELATE ORAL [id number] Yes Take 144 mg by mouth once daily in the evening. Take with meals. Historical Provider, MD  Active   melatonin 10 mg tablet [id number] Yes Take 1 tablet (10 mg) by mouth as needed at bedtime. Vinh Fernando MD  Active   MULTIVITAMIN ORAL [id number] Yes Take 1 tablet by mouth 2 times a day. Vinh Fernando MD  Active   Nurtec ODT 75 mg tablet,disintegrating [id number] Yes Dissolve 1 tablet (75 mg) in the mouth once daily as needed. Historical Provider, MD  Active   onabotulinumtoxinA (Botox) 100 unit " injection [id number] Yes Inject into the muscle 1 time. Historical Provider, MD  Active   oxyCODONE-acetaminophen (Percocet) 5-325 mg tablet [id number] Yes Take 1 tablet by mouth every 6 hours if needed. Historical Provider, MD  Active   polyethylene glycol (Glycolax, Miralax) 17 gram packet [id number] Yes Take 17 g by mouth once daily. Historical Provider, MD  Active   potassium citrate CR (Urocit-K-10) 10 mEq ER tablet [id number]  Take 1 tablet (10 mEq) by mouth 2 times a day.   Patient not taking: Reported on 2025    Alexa Blake MD  Active   progesterone (Prometrium) 200 mg capsule [id number] Yes Take 1 capsule (200 mg) by mouth once daily at bedtime. Historical Provider, MD  Active   psyllium (Metamucil) 3.4 gram packet [id number] Yes Take 1 packet by mouth once daily. Historical Provider, MD  Active   sodium bicarbonate 650 mg tablet [id number]  Take 1 tablet (650 mg) by mouth 2 times a day. Before breakfast and at bedtime   Patient not taking: Reported on 2025    Historical Provider, MD  Active   surgical lubricant (Surgilube) gel [id number] Yes USE ONE GRAM THREE TIMES A DAY FOR VAGINAL DILATION FOR THREE MONTHS, THEN AS INSTRUCTED. SINGH Worthy  Active   tadalafil (Cialis) 5 mg tablet [id number]  Take 1 tablet (5 mg) by mouth once daily as needed (sexual activity).   Patient not taking: Reported on 2025    SINGH Worthy   24 2359   testosterone 20.25 mg/1.25 gram (1.62 %) gel in metered-dose pump [id number] Yes Apply one pump once weekly Darcy Ross MD MPH  Active   verapamil SR (Calan-SR) 180 mg ER tablet [id number] Yes Take 1 tablet (180 mg) by mouth once daily at bedtime. Do not crush or chew. Historical Provider, MD  Active   vitamin B complex (Vitamins B Complex) tablet [id number] Yes Take 1 tablet by mouth 2 times daily (morning and late afternoon). Historical Provider, MD  Active                     SOCIAL HISTORY:  Patient  reports that she has never  smoked. She has never used smokeless tobacco. She reports that she does not currently use alcohol. She reports that she does not use drugs.   Social History     Socioeconomic History    Marital status: Single     Spouse name: Not on file    Number of children: Not on file    Years of education: Not on file    Highest education level: Not on file   Occupational History    Not on file   Tobacco Use    Smoking status: Never    Smokeless tobacco: Never   Vaping Use    Vaping status: Never Used   Substance and Sexual Activity    Alcohol use: Not Currently     Comment: ~35 years ago. Brief and rare then. Not since.    Drug use: Never    Sexual activity: Never   Other Topics Concern    Not on file   Social History Narrative    Not on file     Social Drivers of Health     Financial Resource Strain: Low Risk  (1/13/2025)    Received from [facility]    Overall Financial Resource Strain (CARDIA)     Difficulty of Paying Living Expenses: Not hard at all   Food Insecurity: No Food Insecurity (1/13/2025)    Received from [facility]    Hunger Vital Sign     Within the past 12 months, you worried that your food would run out before you got the money to buy more.: Never true     Within the past 12 months, the food you bought just didn't last and you didn't have money to get more.: Never true   Transportation Needs: No Transportation Needs (1/13/2025)    Received from [facility]    PRAPARE - Transportation     Lack of Transportation (Medical): No     Lack of Transportation (Non-Medical): No   Physical Activity: Insufficiently Active (1/13/2025)    Received from [facility]    Exercise Vital Sign     On average, how many days per week do you engage in moderate to strenuous exercise (like a brisk walk)?: 1 day     On average, how many minutes do you engage in exercise at this level?: 30 min   Stress: No Stress Concern Present (1/13/2025)    Received from [facility]    [employer] - Occupational Stress  Questionnaire     Feeling of Stress : Not at all   Social Connections: Moderately Isolated (1/13/2025)    Received from [facility] [place]    Social Connection and Isolation Panel     In a typical week, how many times do you talk on the phone with family, friends, or neighbors?: More than three times a week     How often do you get together with friends or relatives?: More than three times a week     How often do you attend Yarsanism or Latter-day services?: Never     Do you belong to any clubs or organizations such as Yarsanism groups, unions, fraternal or athletic groups, or school groups?: Yes     How often do you attend meetings of the clubs or organizations you belong to?: More than 4 times per year     Are you , , , , never , or living with a partner?: Never    Intimate Partner Violence: Not At Risk (1/13/2025)    Received from [facility] [place]    Humiliation, Afraid, Rape, and Kick questionnaire     Within the last year, have you been afraid of your partner or ex-partner?: No     Within the last year, have you been humiliated or emotionally abused in other ways by your partner or ex-partner?: No     Within the last year, have you been kicked, hit, slapped, or otherwise physically hurt by your partner or ex-partner?: No     Within the last year, have you been raped or forced to have any kind of sexual activity by your partner or ex-partner?: No   Housing Stability: Low Risk  (1/13/2025)    Received from [facility]    Housing Stability Vital Sign     In the last 12 months, was there a time when you were not able to pay the mortgage or rent on time?: No     In the past 12 months, how many times have you moved where you were living?: 0     At any time in the past 12 months, were you homeless or living in a shelter (including now)?: No       FAMILY HISTORY:  Family History[4]    REVIEW OF SYSTEMS:  Constitutional: Negative for fever and chills. Denies anorexia, weight loss.  Eyes:  Negative for visual disturbance.   Respiratory: Negative for shortness of breath.    Cardiovascular: Negative for chest pain.   Gastrointestinal: Negative for nausea and vomiting.   Genitourinary: See interval history above.  Skin: Negative for rash.   Neurological: Negative for dizziness and numbness.   Psychiatric/Behavioral: Negative for confusion and decreased concentration.     PHYSICAL EXAM:  Blood pressure 114/71, pulse 73, temperature 36.1 °C (96.9 °F).  Constitutional: Patient appears well-developed and well-nourished. No distress.   Head: Normocephalic and atraumatic.    Neck: Normal range of motion.    Cardiovascular: Normal rate.    Pulmonary/Chest: Effort normal. No respiratory distress.   Musculoskeletal: Normal range of motion.    Neurological: Alert and oriented to person, place, and time.  Psychiatric: Normal mood and affect. Behavior is normal. Thought content normal.      LABORATORY REVIEW:   Lab Results   Component Value Date    TESTOSTERONE 22 03/20/2025     Assessment:      1. Gender dysphoria        2. Anorgasmia of female        3. Hypoactive sexual desire disorder            Ashley Valencia is a 60 y.o. adult with gender dysphoria, anorgasmia, and HSDD.     We discussed ordering an updated testosterone panel to see if her testosterone levels are high enough with using this twice a week. Also discussed referring the patient to Dr. Busby for HSDD. Lastly, the patient was informed about a website called [employer] yes. Patient verbalizes understanding and will follow-up in 3 months.      Plan:   Order testosterone labs.   Informed the patient about a website called [employer] yes.   Referral to Earl Busby for HSDD.   Follow-up in 3 months.       Darcy Ross MD MPH      Scribe Attestation  By signing my name below, ISavanah, Scribe   attest that this documentation has been prepared under the direction and in the presence of Darcy Ross MD MPH.            [1]   Past Medical History:  Diagnosis  Date    Calculus of kidney 01/28/2019    Kidney stone on left side    Depression     Fibromyalgia     Gender dysphoria     HLD (hyperlipidemia)     HTN (hypertension)     IBS (irritable bowel syndrome)     Migraines     Personal history of colonic polyps 05/07/2021    Sleep apnea 3/6/24    Unspecified hydronephrosis 10/08/2020    Hydronephrosis, left    Urinary tract infection 2021?   [2]   Past Surgical History:  Procedure Laterality Date    COLON SURGERY  Colonoscopy (2018 for last?), fissurectomy (?2010?) polypectomy (?2010?)    HEMORRHOID SURGERY  12/11/2018    Hemorrhoidectomy    KIDNEY STONE SURGERY  6/10/2021, 9/26/2023    OTHER SURGICAL HISTORY  12/11/2018    Colonoscopy   [3] No Known Allergies  [4]   Family History  Problem Relation Name Age of Onset    Breast cancer Mother      Dementia Mother      Hypertension Mother      Carpal tunnel syndrome Mother      Osteoarthritis Mother      Diabetes Other      Arthritis Other      Cancer Other      Hypertension Other      Alcohol abuse Other      Alcohol abuse Father Christian Valencia     Alcohol abuse Brother Donald Valencia     Heart disease Brother Donald Valencia

## 2025-08-07 LAB — TESTOST SERPL-MCNC: 7 NG/DL (ref 2–45)

## 2025-09-03 ENCOUNTER — APPOINTMENT (OUTPATIENT)
Dept: UROLOGY | Facility: CLINIC | Age: 60
End: 2025-09-03
Payer: COMMERCIAL

## 2025-09-23 ENCOUNTER — APPOINTMENT (OUTPATIENT)
Dept: UROLOGY | Facility: CLINIC | Age: 60
End: 2025-09-23
Payer: COMMERCIAL

## 2025-11-03 ENCOUNTER — APPOINTMENT (OUTPATIENT)
Dept: UROLOGY | Facility: CLINIC | Age: 60
End: 2025-11-03
Payer: COMMERCIAL

## 2025-12-05 ENCOUNTER — APPOINTMENT (OUTPATIENT)
Dept: UROLOGY | Facility: CLINIC | Age: 60
End: 2025-12-05
Payer: COMMERCIAL

## (undated) DEVICE — DRESSING, WOUND, MEPITEL, 4X8

## (undated) DEVICE — SLEEVE, VASO PRESS, CALF GARMENT, MEDIUM, GREEN

## (undated) DEVICE — DILATOR, VAGINAL, GRS, SIZE 3, GREEN

## (undated) DEVICE — OBTURATOR, BLADELESS , SU

## (undated) DEVICE — ELECTRODE, ELECTROSURGICAL, BLADE, 2.75 IN, TEFLON

## (undated) DEVICE — Device

## (undated) DEVICE — WOUND SYSTEM, DEBRIDEMENT & CLEANING, O.R DUOPAK

## (undated) DEVICE — NEEDLE, SPINAL, 22 G X 3.5 IN, BLACK HUB

## (undated) DEVICE — STAPLER, SKIN, PLUS, WIDE, 35

## (undated) DEVICE — CARE KIT, LAPAROSCOPIC, ADVANCED

## (undated) DEVICE — APPLICATOR, CHLORAPREP, W/ORANGE TINT, 26ML

## (undated) DEVICE — KIT, APPLICATOR, DUPLOSPRAY, 30CM

## (undated) DEVICE — COVER, TIP HOT SHEARS ENDOWRIST

## (undated) DEVICE — LASER FIBER, HOLMIUM 200

## (undated) DEVICE — WOUND VAC KIT, W/CANNISTER, 500ML, 5/PK

## (undated) DEVICE — DRESSING, GAUZE, SUPER FLUFF, 7.75 X 8.75 IN, STERILE

## (undated) DEVICE — SOLUTION, INJECTION, CONTRAST, OMNIPAQUE 240MG 50ML, PLUS PAK

## (undated) DEVICE — POSITIONING SYSTEM, PAGAZZI, PATIENT

## (undated) DEVICE — GOWN, ASTOUND, XL

## (undated) DEVICE — DRAPE, DERMATAC, 19.7 X 21CM

## (undated) DEVICE — GUIDEWIRE, STRAIGHT, AMPLATZ SUPER STIFF, 0.035 IN X 180 CM

## (undated) DEVICE — FORCEPS, PROGRASP, DAVINCI XI

## (undated) DEVICE — DRAPE, SHEET, UTILITY, NON ABSORBENT, 18 X 26 IN, LF

## (undated) DEVICE — CATHETER, URETERAL ACCESS FLEXI-TIP 10FR

## (undated) DEVICE — DILATOR, VAGINAL, GRS, SIZE P2, ORANGE

## (undated) DEVICE — DRAPE, UNDERBUTTOCKS

## (undated) DEVICE — COLLECTION BAG, FLUID, NON-STERILE

## (undated) DEVICE — DRAPE, LEGGINGS, 48 X 31 IN, STERILE, LF

## (undated) DEVICE — CORD, CAUTERY, BIOPOLAR FORCEP, 12FT

## (undated) DEVICE — SPONGE, NEURO, 1/2 X 3IN, STERILE, LF

## (undated) DEVICE — CATHETER, IV, ANGIOCATH, 14 G X 1.88 IN, FEP POLYMER

## (undated) DEVICE — ELECTROSURGICAL, CLEANER, LECTROBRASIVE

## (undated) DEVICE — SYRINGE, 60 CC, IRRIGATION, PISTON, CATH TIP, W/LUER ADAPTER,DISP

## (undated) DEVICE — OINTMENT, BACITRACIN, W/ZINC, 1 OZ

## (undated) DEVICE — SYRINGE, 30 CC, LUER LOCK

## (undated) DEVICE — CATHETER TRAY, SURESTEP, 16FR, URINE METER W/STATLOCK

## (undated) DEVICE — IRRIGATION SET, CYSTOSCOPY, REGULATING CLAMP, STRAIGHT, 81 IN

## (undated) DEVICE — HOLSTER, ELECTROSURGERY ACCESSORY, STERILE

## (undated) DEVICE — DILATOR, VAGINAL, GRS, SIZE P1, VIOLET

## (undated) DEVICE — DRAPE, SHEET, 17 X 23 IN

## (undated) DEVICE — DRAIN, JP CHANNEL, 10 FR, FULL FLUTE

## (undated) DEVICE — LUBRICANT, SURGILUBE, STERILE, 2OZ

## (undated) DEVICE — TROCAR, KII OPTICAL SEPARATOR, 8MM X 100MM,  Z-THREAD

## (undated) DEVICE — DRAPE, SURGICAL, OB/GYN

## (undated) DEVICE — DRESSING, GAUZE, PETROLATUM, XEROFORM, 5 X 9 IN, STERILE

## (undated) DEVICE — DILATOR, VAGINAL, GRS, SIZE 1, VIOLET

## (undated) DEVICE — DILATOR, VAGINAL, GRS, SIZE 2, BLUE

## (undated) DEVICE — DRAPE, COLUMN, DAVINCI XI

## (undated) DEVICE — BRIEF, STRETCH, XXXLARGE, MESH PANTS

## (undated) DEVICE — STAPLER, SKIN PROXIMATE, 35 WIDE

## (undated) DEVICE — ELECTRODE, ELECTROSURGICAL, BLADE EXT 4 INCH, INSULATED

## (undated) DEVICE — DRAPE, INSTRUMENT, W/POUCH, STERI DRAPE, 7 X 11 IN, DISPOSABLE, STERILE

## (undated) DEVICE — SCOPE, LITHOVUE SUD ONLY, GLOBAL STANDARD

## (undated) DEVICE — SCISSORS, MONOPOLAR, CURVED, 8MM

## (undated) DEVICE — TISSEEL FIBRIN SEALANT, PRIMA, FROZEN, 10ML

## (undated) DEVICE — DRESSING, QUICKCLOT, HEMOSTATIC, 5 X 5

## (undated) DEVICE — DRAIN, PENROSE, 0.25 X 18 IN, LATEX, STERILE

## (undated) DEVICE — BASKET, STONE, RETRIEVAL, NITINOL (4WIRE, 1.9 0 TIP)

## (undated) DEVICE — DRESSING, TRANSPARENT, TEGADERM, 4 X 4-3/4 IN, NO LABEL

## (undated) DEVICE — SYRINGE, 50 CC, LUER LOCK

## (undated) DEVICE — FILTER, LAPAROSCOPIC, PLUME PORT, W/LUER CONNECTOR, STERILE

## (undated) DEVICE — SYRINGE, 10 CC, LUER LOCK

## (undated) DEVICE — RETRACTOR, CORDLESS, RADIALUX, LIGHTED

## (undated) DEVICE — STENT, URETERAL, INLAY, 6FR X 26CM, W/O GUIDEWIRE

## (undated) DEVICE — DILATOR, VAGINAL, GRS, SIZE 4, ORANGE

## (undated) DEVICE — SEAL, UNIVERSAL 5-8MM  XI

## (undated) DEVICE — DRAPE, SHEET, THREE QUARTER, FAN FOLD, 57 X 77 IN

## (undated) DEVICE — STAY SET, SURGICAL, 5MM SHARP HOOK, 8PK

## (undated) DEVICE — CATHETER, IV, ANGIOCATH, 12 G X 3 IN, FEP POLYMER

## (undated) DEVICE — STRIP, SKIN CLOSURE, STERI STRIP, REINFORCED, 0.5 X 4 IN

## (undated) DEVICE — GUIDEWIRE, DUAL SENSOR, .035 X 150 STRAIGHT,  3CM

## (undated) DEVICE — DRAPE, ARM XI

## (undated) DEVICE — DRESSING, ABDOMINAL, TENDERSORB, 8 X 7-1/2 IN, STERILE

## (undated) DEVICE — DRESSING, GAUZE, PETROLATUM, PATCH, XEROFORM, 1 X 8 IN, STERILE

## (undated) DEVICE — FOAM, GRAM, SILVER, MEDIUM DRESSING, VAC

## (undated) DEVICE — ELECTRODE, ELECTROSURGICAL, GUARDED TIP

## (undated) DEVICE — NEEDLE, EPIDURAL 17G X 4 1/2 PERIFIX

## (undated) DEVICE — TIP, ELECTROSURGICAL, 2.5 BLADE, MODIFIED

## (undated) DEVICE — COVER, TRANSDUCER, NEO GUARD, 2.6 X 30CM, LF

## (undated) DEVICE — SYRINGE ONLY, SLIP TIP 50CC

## (undated) DEVICE — NEEDLE, PNEUMOPERITONEUM, 120MM